# Patient Record
Sex: FEMALE | Race: WHITE | NOT HISPANIC OR LATINO | Employment: PART TIME | URBAN - METROPOLITAN AREA
[De-identification: names, ages, dates, MRNs, and addresses within clinical notes are randomized per-mention and may not be internally consistent; named-entity substitution may affect disease eponyms.]

---

## 2018-12-10 ENCOUNTER — APPOINTMENT (OUTPATIENT)
Dept: URGENT CARE | Facility: CLINIC | Age: 43
End: 2018-12-10

## 2018-12-10 DIAGNOSIS — Z02.1 PHYSICAL EXAM, PRE-EMPLOYMENT: Primary | ICD-10-CM

## 2018-12-10 PROCEDURE — 86762 RUBELLA ANTIBODY: CPT | Performed by: PHYSICIAN ASSISTANT

## 2018-12-10 PROCEDURE — 86480 TB TEST CELL IMMUN MEASURE: CPT | Performed by: PHYSICIAN ASSISTANT

## 2018-12-10 PROCEDURE — 86787 VARICELLA-ZOSTER ANTIBODY: CPT | Performed by: PHYSICIAN ASSISTANT

## 2018-12-10 PROCEDURE — 86765 RUBEOLA ANTIBODY: CPT | Performed by: PHYSICIAN ASSISTANT

## 2018-12-10 PROCEDURE — 86735 MUMPS ANTIBODY: CPT | Performed by: PHYSICIAN ASSISTANT

## 2018-12-11 LAB
MEV IGG SER QL: NORMAL
MUV IGG SER QL: NORMAL
RUBV IGG SERPL IA-ACNC: 35.3 IU/ML
VZV IGG SER IA-ACNC: NORMAL

## 2018-12-12 LAB
GAMMA INTERFERON BACKGROUND BLD IA-ACNC: 0.02 IU/ML
M TB IFN-G BLD-IMP: NEGATIVE
M TB IFN-G CD4+ BCKGRND COR BLD-ACNC: 0 IU/ML
M TB IFN-G CD4+ BCKGRND COR BLD-ACNC: 0 IU/ML
MITOGEN IGNF BCKGRD COR BLD-ACNC: 0.59 IU/ML

## 2019-02-08 ENCOUNTER — TRANSCRIBE ORDERS (OUTPATIENT)
Dept: ADMINISTRATIVE | Facility: HOSPITAL | Age: 44
End: 2019-02-08

## 2019-02-08 DIAGNOSIS — R92.8 ABNORMAL MAMMOGRAM: Primary | ICD-10-CM

## 2019-02-13 ENCOUNTER — HOSPITAL ENCOUNTER (OUTPATIENT)
Dept: ULTRASOUND IMAGING | Facility: CLINIC | Age: 44
Discharge: HOME | End: 2019-02-13
Payer: COMMERCIAL

## 2019-02-13 ENCOUNTER — HOSPITAL ENCOUNTER (OUTPATIENT)
Dept: MAMMOGRAPHY | Facility: CLINIC | Age: 44
Discharge: HOME | End: 2019-02-13
Payer: COMMERCIAL

## 2019-02-13 VITALS — HEIGHT: 64 IN | WEIGHT: 135 LBS | BODY MASS INDEX: 23.05 KG/M2

## 2019-02-13 DIAGNOSIS — R92.8 ABNORMAL MAMMOGRAM: ICD-10-CM

## 2019-02-13 PROCEDURE — 77066 DX MAMMO INCL CAD BI: CPT

## 2019-02-13 PROCEDURE — 76642 ULTRASOUND BREAST LIMITED: CPT

## 2019-02-13 PROCEDURE — G0279 TOMOSYNTHESIS, MAMMO: HCPCS

## 2019-02-13 NOTE — PROGRESS NOTES
Met with patient and Dr Russ Lindsay regarding recommendation for;      __x___ RIGHT ___x___LEFT      __x___Ultrasound guided  ______Stereotactic  Breast biopsy  __x___Verbalized understanding        Blood thinners:  _____yes __x___no    Date stopped: ___n/a________    Biopsy teaching sheet given:  ___x____yes ______no

## 2019-02-20 ENCOUNTER — HOSPITAL ENCOUNTER (OUTPATIENT)
Dept: ULTRASOUND IMAGING | Facility: CLINIC | Age: 44
Discharge: HOME | End: 2019-02-20
Admitting: RADIOLOGY
Payer: COMMERCIAL

## 2019-02-20 ENCOUNTER — HOSPITAL ENCOUNTER (OUTPATIENT)
Dept: MAMMOGRAPHY | Facility: CLINIC | Age: 44
Discharge: HOME | End: 2019-02-20

## 2019-02-20 VITALS
HEART RATE: 76 BPM | WEIGHT: 135 LBS | BODY MASS INDEX: 23.05 KG/M2 | SYSTOLIC BLOOD PRESSURE: 92 MMHG | DIASTOLIC BLOOD PRESSURE: 64 MMHG | HEIGHT: 64 IN

## 2019-02-20 DIAGNOSIS — R92.8 ABNORMAL MAMMOGRAM: ICD-10-CM

## 2019-02-20 PROCEDURE — 88305 TISSUE EXAM BY PATHOLOGIST: CPT | Performed by: PATHOLOGY

## 2019-02-20 PROCEDURE — 88342 IMHCHEM/IMCYTCHM 1ST ANTB: CPT | Performed by: PATHOLOGY

## 2019-02-20 PROCEDURE — 88341 IMHCHEM/IMCYTCHM EA ADD ANTB: CPT | Performed by: PATHOLOGY

## 2019-02-20 PROCEDURE — 19083 BX BREAST 1ST LESION US IMAG: CPT

## 2019-02-20 PROCEDURE — 19084 BX BREAST ADD LESION US IMAG: CPT

## 2019-02-20 PROCEDURE — 88361 TUMOR IMMUNOHISTOCHEM/COMPUT: CPT | Performed by: PATHOLOGY

## 2019-02-20 RX ORDER — LIDOCAINE HYDROCHLORIDE 10 MG/ML
4 INJECTION, SOLUTION INFILTRATION; PERINEURAL ONCE
Status: COMPLETED | OUTPATIENT
Start: 2019-02-20 | End: 2019-02-20

## 2019-02-20 RX ADMIN — LIDOCAINE HYDROCHLORIDE 4 ML: 10 INJECTION, SOLUTION INFILTRATION; PERINEURAL at 10:25

## 2019-02-20 RX ADMIN — LIDOCAINE HYDROCHLORIDE 4 ML: 10 INJECTION, SOLUTION INFILTRATION; PERINEURAL at 10:40

## 2019-02-20 NOTE — PROGRESS NOTES
Procedure type: (1st site)    __x___ultrasound guided _____stereotactic    Breast:    __x___Left _____Right    Location: 12:00 periareolar    Needle: 12g Marquee    # of passes: 4 (specimen A)    Clip: Securmark-cylinder    Performed by: Dr Misael Stanford held for 5 minutes by: Blayne Brown    Steri Strips:    ___x__yes _____no    Band aid:    __x___yes_____no    Tape and guaze:    _____yes ___x__no    Tolerated procedure:    __x___yes _____no      Procedure type: (2nd site)    __x___ultrasound guided _____stereotactic    Breast:    _____Left __x___Right    Location: 11:00 7cm from nipple    Needle: 12g Marquee    # of passes: 4 (specimen B)    Clip: Securmark-cylinder    Performed by: Dr Misael Stanford held for 5 minutes by: Blayne Cokeri Strips:    __x___yes _____no    Band aid:    __x___yes_____no    Tape and guaze:    _____yes __x___no    Tolerated procedure:    __x___yes _____no

## 2019-02-20 NOTE — DISCHARGE INSTR - OTHER ORDERS
POST LARGE CORE BREAST BIOPSY PATIENT INFORMATION      1  Place an ice pack inside your bra over the top of the dressing every hour for 20 minutes (20 minutes on, 60 minutes off)  Do this until bedtime  2  Do not shower or bathe until the following morning  3  You may bathe your breast carefully with the steri-strips in place  Be careful    Not to loosen them  The steri-strips will fall off in 3-5 days  4  You may have mild discomfort, and you may have some bruising where the   Needle entered the skin  This should clear within 5-7 days  5  If you need medicine for discomfort, take acetaminophen products such as   Tylenol  You may also take Advil or Motrin products  6  Do not participate in strenuous activities such as-tennis, aerobics, skiing,  Weight lifting, etc  for 24 hours  Refrain from swimming/soaking for 72 hours  7  Wearing a bra for sleeping may be more comfortable for the first 24-48 hours  8  Watch for continued bleeding, pain or fever over 101; please call with any questions or concerns  For procedures done at the [facility]  [facility] [address] call:  Quan Felipe RN at [phone]  Waldemar Gloria RN at [phone]                    *After 4 PM call the Interventional Radiology Department                    [phone] and ask to speak with the nurse on call  For procedures done at the [facility] call:         Kash Howard RN at   *After 4 PM call the Interventional Radiology Department   [phone] and ask to speak with the nurse on call  For procedures done at [facility] call: The Radiology Nurse at [phone]  *After 4 PM call your physician, or go to the Emergency Department  9          The final results of your biopsy are usually available within one week

## 2019-02-21 NOTE — PROGRESS NOTES
Post procedure call completed on 2/21/19 @ 7953    Bleeding: __x___yes _____no    Pain: __x___yes ______no    Redness/Swelling: __x____yes ______no    Band aid removed: __x___yes _____no    Steri-Strips intact: __x____yes _____no    Pt reported that she has mild bruising, discomfort, swelling and bleeding last evening that has  stopped

## 2019-02-25 ENCOUNTER — TELEPHONE (OUTPATIENT)
Dept: MAMMOGRAPHY | Facility: CLINIC | Age: 44
End: 2019-02-25

## 2019-02-25 ENCOUNTER — TRANSCRIBE ORDERS (OUTPATIENT)
Dept: ADMINISTRATIVE | Facility: HOSPITAL | Age: 44
End: 2019-02-25

## 2019-02-25 ENCOUNTER — APPOINTMENT (OUTPATIENT)
Dept: LAB | Facility: HOSPITAL | Age: 44
End: 2019-02-25
Payer: COMMERCIAL

## 2019-02-25 DIAGNOSIS — R53.82 CHRONIC FATIGUE SYNDROME: Primary | ICD-10-CM

## 2019-02-25 DIAGNOSIS — R53.82 CHRONIC FATIGUE SYNDROME: ICD-10-CM

## 2019-02-25 LAB
25(OH)D3 SERPL-MCNC: 28.3 NG/ML (ref 30–100)
ANION GAP SERPL CALCULATED.3IONS-SCNC: 4 MMOL/L (ref 4–13)
BUN SERPL-MCNC: 17 MG/DL (ref 5–25)
CALCIUM SERPL-MCNC: 8.6 MG/DL (ref 8.3–10.1)
CHLORIDE SERPL-SCNC: 101 MMOL/L (ref 100–108)
CHOLEST SERPL-MCNC: 168 MG/DL (ref 50–200)
CO2 SERPL-SCNC: 29 MMOL/L (ref 21–32)
CREAT SERPL-MCNC: 0.81 MG/DL (ref 0.6–1.3)
ERYTHROCYTE [DISTWIDTH] IN BLOOD BY AUTOMATED COUNT: 11.7 % (ref 11.6–15.1)
GFR SERPL CREATININE-BSD FRML MDRD: 90 ML/MIN/1.73SQ M
GLUCOSE P FAST SERPL-MCNC: 106 MG/DL (ref 65–99)
HCT VFR BLD AUTO: 35.5 % (ref 34.8–46.1)
HDLC SERPL-MCNC: 50 MG/DL (ref 40–60)
HGB BLD-MCNC: 11.3 G/DL (ref 11.5–15.4)
LDLC SERPL CALC-MCNC: 106 MG/DL (ref 0–100)
MCH RBC QN AUTO: 27.7 PG (ref 26.8–34.3)
MCHC RBC AUTO-ENTMCNC: 31.8 G/DL (ref 31.4–37.4)
MCV RBC AUTO: 87 FL (ref 82–98)
NONHDLC SERPL-MCNC: 118 MG/DL
PLATELET # BLD AUTO: 267 THOUSANDS/UL (ref 149–390)
PMV BLD AUTO: 10.9 FL (ref 8.9–12.7)
POTASSIUM SERPL-SCNC: 3.7 MMOL/L (ref 3.5–5.3)
RBC # BLD AUTO: 4.08 MILLION/UL (ref 3.81–5.12)
SODIUM SERPL-SCNC: 134 MMOL/L (ref 136–145)
TRIGL SERPL-MCNC: 62 MG/DL
TSH SERPL DL<=0.05 MIU/L-ACNC: 3.31 UIU/ML (ref 0.36–3.74)
WBC # BLD AUTO: 6.68 THOUSAND/UL (ref 4.31–10.16)

## 2019-02-25 PROCEDURE — 85027 COMPLETE CBC AUTOMATED: CPT

## 2019-02-25 PROCEDURE — 80061 LIPID PANEL: CPT

## 2019-02-25 PROCEDURE — 84443 ASSAY THYROID STIM HORMONE: CPT

## 2019-02-25 PROCEDURE — 36415 COLL VENOUS BLD VENIPUNCTURE: CPT

## 2019-02-25 PROCEDURE — 80048 BASIC METABOLIC PNL TOTAL CA: CPT

## 2019-02-25 PROCEDURE — 82306 VITAMIN D 25 HYDROXY: CPT

## 2019-02-26 ENCOUNTER — OFFICE VISIT (OUTPATIENT)
Dept: URGENT CARE | Facility: CLINIC | Age: 44
End: 2019-02-26
Payer: COMMERCIAL

## 2019-02-26 VITALS
HEART RATE: 88 BPM | SYSTOLIC BLOOD PRESSURE: 99 MMHG | WEIGHT: 140 LBS | TEMPERATURE: 96.9 F | RESPIRATION RATE: 12 BRPM | DIASTOLIC BLOOD PRESSURE: 58 MMHG | HEIGHT: 64 IN | BODY MASS INDEX: 23.9 KG/M2

## 2019-02-26 DIAGNOSIS — J01.90 ACUTE SINUSITIS, RECURRENCE NOT SPECIFIED, UNSPECIFIED LOCATION: Primary | ICD-10-CM

## 2019-02-26 PROCEDURE — S9088 SERVICES PROVIDED IN URGENT: HCPCS | Performed by: PHYSICIAN ASSISTANT

## 2019-02-26 PROCEDURE — 99213 OFFICE O/P EST LOW 20 MIN: CPT | Performed by: PHYSICIAN ASSISTANT

## 2019-02-26 RX ORDER — AMOXICILLIN AND CLAVULANATE POTASSIUM 875; 125 MG/1; MG/1
1 TABLET, FILM COATED ORAL EVERY 12 HOURS SCHEDULED
Qty: 14 TABLET | Refills: 0 | Status: SHIPPED | OUTPATIENT
Start: 2019-02-26 | End: 2019-03-05

## 2019-02-26 NOTE — PATIENT INSTRUCTIONS
Take augmentin as directed   Ibuprofen as needed  Allegra or zyrtec  flonase  Increase fluids   F/u with PCP if no improvement in 3-4 days  If anything changes or worsens f/u sooner

## 2019-02-26 NOTE — PROGRESS NOTES
NAME: Nicolasa Aguilar is a 41 y o  female  : 1975    MRN: [id number]      Assessment and Plan   Acute sinusitis, recurrence not specified, unspecified location [J01 90]  1  Acute sinusitis, recurrence not specified, unspecified location  amoxicillin-clavulanate (AUGMENTIN) 875-125 mg per tablet           Patient Instructions   Patient Instructions   Take augmentin as directed   Ibuprofen as needed  Allegra or zyrtec  flonase  Increase fluids   F/u with PCP if no improvement in 3-4 days  If anything changes or worsens f/u sooner     Proceed to ER if symptoms worsen  Chief Complaint   No chief complaint on file  History of Present Illness   Patient with no pmhx presents complaining of 2 day hx of sore throat  She states a few weeks ago she had an URI and states it lasted a few weeks  She reports she was starting to feel better for a few days but then acutely worsened again  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  Patient reports sinus p/p, b/l ear pain, sore throat, cough and congestion  She denies fevers, chills, chest tightness, SOB or dyspnea  She states she has not been taking anything for her sx  Review of Systems   Review of Systems   Constitutional: Negative for chills and fever  HENT: Positive for congestion, ear pain, rhinorrhea, sinus pressure, sinus pain and sore throat  Respiratory: Positive for cough  Negative for chest tightness, shortness of breath and wheezing  Cardiovascular: Negative for chest pain and palpitations  Current Medications       Current Outpatient Medications:     amoxicillin-clavulanate (AUGMENTIN) 875-125 mg per tablet, Take 1 tablet by mouth every 12 (twelve) hours for 7 days, Disp: 14 tablet, Rfl: 0    Current Allergies     Allergies as of 2019    (No Known Allergies)              History reviewed  No pertinent past medical history      Past Surgical History:   Procedure Laterality Date    SINUS SURGERY  US GUIDANCE BREAST BIOPSY RIGHT EACH ADDITIONAL Right 2/20/2019    US GUIDED BREAST BIOPSY LEFT COMPLETE Left 2/20/2019       History reviewed  No pertinent family history  Medications have been verified  The following portions of the patient's history were reviewed and updated as appropriate: allergies, current medications, past family history, past medical history, past social history, past surgical history and problem list     Objective   BP 99/58 (BP Location: Left arm, Patient Position: Sitting, Cuff Size: Standard)   Pulse 88   Temp (!) 96 9 °F (36 1 °C) (Tympanic)   Resp 12   Ht 5' 4" (1 626 m)   Wt 63 5 kg (140 lb)   LMP 02/15/2019 (Exact Date)   BMI 24 03 kg/m²      Physical Exam     Physical Exam   Constitutional: She appears well-developed and well-nourished  No distress  HENT:   TMs clear b/l without erythema or bulging  Purulent fluid behind both  Nasal mucosa erythematous with edema  Oropharynx without erythema, edema, exudates or tonsillar hypertrophy  +PND   Cardiovascular: Normal rate, regular rhythm and normal heart sounds  Pulmonary/Chest: Effort normal and breath sounds normal  No stridor  No respiratory distress  She has no wheezes  She has no rales  Lymphadenopathy:     She has no cervical adenopathy  Skin: She is not diaphoretic

## 2019-02-27 ENCOUNTER — CONSULT (OUTPATIENT)
Dept: SURGICAL ONCOLOGY | Facility: CLINIC | Age: 44
End: 2019-02-27
Payer: COMMERCIAL

## 2019-02-27 ENCOUNTER — DOCUMENTATION (OUTPATIENT)
Dept: SURGICAL ONCOLOGY | Facility: CLINIC | Age: 44
End: 2019-02-27

## 2019-02-27 VITALS
BODY MASS INDEX: 23.49 KG/M2 | DIASTOLIC BLOOD PRESSURE: 70 MMHG | SYSTOLIC BLOOD PRESSURE: 100 MMHG | RESPIRATION RATE: 14 BRPM | HEIGHT: 64 IN | TEMPERATURE: 98.3 F | WEIGHT: 137.6 LBS | HEART RATE: 68 BPM

## 2019-02-27 DIAGNOSIS — D24.1 BREAST FIBROADENOMA, RIGHT: ICD-10-CM

## 2019-02-27 DIAGNOSIS — R92.2 DENSE BREAST TISSUE: ICD-10-CM

## 2019-02-27 DIAGNOSIS — D05.12 BREAST NEOPLASM, TIS (DCIS), LEFT: Primary | ICD-10-CM

## 2019-02-27 PROBLEM — N64.52 NIPPLE DISCHARGE: Status: RESOLVED | Noted: 2019-02-27 | Resolved: 2019-02-27

## 2019-02-27 PROBLEM — R92.30 DENSE BREAST TISSUE: Status: ACTIVE | Noted: 2019-02-27

## 2019-02-27 PROCEDURE — 99245 OFF/OP CONSLTJ NEW/EST HI 55: CPT | Performed by: SURGERY

## 2019-02-27 NOTE — LETTER
2019     Dionisio Sharif MD  [address] [address]    Patient: Paula Torres   YOB: 1975   Date of Visit: 2019       Dear Dr Heaven Marshall: Thank you for referring Kike Saeed to me for evaluation  Below are my notes for this consultation  If you have questions, please do not hesitate to call me  I look forward to following your patient along with you  Sincerely,        Regla Garcia MD        CC: No Recipients  Regla Garcia MD  2019  2:57 PM  Sign at close encounter    Breast Consultation-Surgical Oncology     [place] [address]    Name:  Paula Torres  YOB: 1975  MRN:  [id number]    Assessment/Plan   Diagnoses and all orders for this visit:    Breast neoplasm, Tis (DCIS), left    Breast fibroadenoma, right    Dense breast tissue        HPI: Paula Torres is a 41y o  year old female who presents with a left breast cancer  Pt shares she experienced left breast bloody nipple discharge  This was spontaneous and also on compression  She denied any breast lumps or skin changes  Pt had abnormal breast imaging which led to bilateral breast biopsies  Right breast biopsy was benign; left breast biopsy revealed DCIS  Her biopsy sites are intact and healing well with resolving ecchymosis  She denies any family history of breast cancer  Surgical treatment to date consisted of n/a  Oncology History:     No history exists         Pertinent reproductive history:  Age at menarche:  17  OB History        1    Para   1    Term   1            AB        Living           SAB        TAB        Ectopic        Multiple        Live Births               Obstetric Comments   Menarche : 17  Age at first pregnancy 31  Hx of BCP   Hx of Hormone replacement            Age at first live birth:  31  Age at menopause: n/a  Hysterectomy/Oophrectomy:  No  Hormone replacement therapy:  No  Birth control pills:  Yes    Problem List:   There is no problem list on file for this patient      Past Medical History:   Diagnosis Date    Breast cancer ([address] ) 02/20/2019    left DCIS    Difficulty swallowing     Generalized body aches     Night sweat     Nipple discharge     Palpitations     Sinus infection     Weakness      Past Surgical History:   Procedure Laterality Date    LEG SURGERY Left     cyst removed from leg    SINUS SURGERY      US GUIDANCE BREAST BIOPSY RIGHT EACH ADDITIONAL Right 2/20/2019    US GUIDED BREAST BIOPSY LEFT COMPLETE Left 2/20/2019     Family History   Problem Relation Age of Onset    Melanoma Maternal Grandfather 79    Leukemia Maternal Grandmother 80     Social History     Socioeconomic History    Marital status: /Civil Union     Spouse name: Not on file    Number of children: Not on file    Years of education: Not on file    Highest education level: Not on file   Occupational History    Not on file   Social Needs    Financial resource strain: Not on file    Food insecurity:     Worry: Not on file     Inability: Not on file    Transportation needs:     Medical: Not on file     Non-medical: Not on file   Tobacco Use    Smoking status: Former Smoker    Smokeless tobacco: Never Used    Tobacco comment: was a casual former smoker    Substance and Sexual Activity    Alcohol use: Yes     Binge frequency: Weekly     Comment: 7-10 drinks per week     Drug use: No    Sexual activity: Not on file   Lifestyle    Physical activity:     Days per week: Not on file     Minutes per session: Not on file    Stress: Not on file   Relationships    Social connections:     Talks on phone: Not on file     Gets together: Not on file     Attends Rastafarian service: Not on file     Active member of club or organization: Not on file     Attends meetings of clubs or organizations: Not on file Relationship status: Not on file    Intimate partner violence:     Fear of current or ex partner: Not on file     Emotionally abused: Not on file     Physically abused: Not on file     Forced sexual activity: Not on file   Other Topics Concern    Not on file   Social History Narrative    Not on file     Current Outpatient Medications   Medication Sig Dispense Refill    amoxicillin-clavulanate (AUGMENTIN) 875-125 mg per tablet Take 1 tablet by mouth every 12 (twelve) hours for 7 days 14 tablet 0     No current facility-administered medications for this visit  No Known Allergies      The following portions of the patient's history were reviewed and updated as appropriate: allergies, current medications, past family history, past medical history, past social history, past surgical history and problem list     Review of Systems:  Review of Systems   Constitutional: Negative for appetite change and fever  Hot flashes   HENT: Positive for trouble swallowing (will have eval, attributes to sinus problems)  Sinusitis   Eyes: Negative  Respiratory: Negative for shortness of breath  Cardiovascular: Positive for palpitations  Gastrointestinal: Negative  Endocrine: Negative  Genitourinary: Negative  Musculoskeletal: Positive for arthralgias  Negative for myalgias  Skin: Negative  Allergic/Immunologic: Negative  Neurological: Positive for weakness  Hematological: Negative  Negative for adenopathy  Does not bruise/bleed easily  Psychiatric/Behavioral: Negative  Physical Exam:  Physical Exam   Constitutional: She is oriented to person, place, and time  She appears well-developed and well-nourished  HENT:   Head: Normocephalic and atraumatic  Cardiovascular: Normal heart sounds  Pulmonary/Chest: Breath sounds normal  Right breast exhibits skin change (Resolving ecchymosis from recent biopsy)   Right breast exhibits no inverted nipple, no mass, no nipple discharge and no tenderness  Left breast exhibits skin change ( resolving ecchymosis from recent biopsy)  Left breast exhibits no inverted nipple, no mass, no nipple discharge and no tenderness  Bilateral dense tissue with nodularity diffusely   Abdominal: Soft  Lymphadenopathy:        Right axillary: No pectoral and no lateral adenopathy present  Left axillary: No pectoral and no lateral adenopathy present  Right: No supraclavicular adenopathy present  Left: No supraclavicular adenopathy present  Neurological: She is alert and oriented to person, place, and time  Psychiatric: She has a normal mood and affect  Laboratory:  2019 core biopsy of the bilateral breast     Pathology revealed: ductal carcinoma in situ on the left side    Histologic grade: moderately differentiated     Angiolymphatic invasion:  absent    Tumor node status:  Clinically Negative    Hormone receptor status:  estrogen receptor positive and progesterone receptor positive    Other studies:  Outside bilateral screening mammogram done [facility] was a BI-RADS 0 with additional imaging recommended for the bilateral breasts    Imagin19  3D bilateral dx mammogram/US  B4 (4) extremely dense breast tissue with nodularity in the bilateral upper outer quadrants; there is a 12 mm hypoechoic lesion on the right side for which biopsy was recommended and a 9 mm hypoechoic lesion left retroareolar for which biopsy was recommended  19  Bilateral breast biopsies as noted above, these were concordant, breast MRI was recommended secondary to the extremely dense tissue as well as additional nodularity throughout the breast with presumed cystic changes      Discussion/Summary:  53-year-old female who presents today secondary to ductal carcinoma in situ of the left breast   She presented with bloody nipple discharge  She had abnormal imaging 2-3 months ago at an outside facility    She then presented to our [facility] [place] for bilateral diagnostic imaging  She had bilateral core biopsies  The right side was benign  The left side reveals intermediate grade ductal carcinoma in situ, estrogen/progesterone positive  She does have extremely dense breast tissue as well as additional nodularity bilateral   An MRI was recommended for further evaluation  I do agree with this recommendation and will make these arrangements for her  This will be helpful to identify any occult disease and to define extent of disease on the left side  Additionally given her young age, I am recommending genetic testing  She is agreeable to proceeding with this  She understands that the results of the genetic evaluation would alter any treatment recommendations  She would potentially need to be screened for other carcinomas  If her MRI is otherwise of no concern and there are no significant mutations on the genetic testing, she would then be able to have breast conservation  This would include a left lumpectomy and radiation therapy as well as adjuvant endocrine therapy  I would not see any role for lymphatic mapping should she have breast conservation based on her current imaging and examination  All of her questions were answered today  As stated, I will make arrangements for the breast MRI and genetic testing  I will call her with these results  Further recommendations will be made at that time

## 2019-02-27 NOTE — PROGRESS NOTES
Oncology Navigator Visit  Breast Nurse Navigator    Met patient during scheduled appointment with Dr Jadyn Marcelo  Discussed role of Breast Nurse Navigator throughout care and treatment  Also discussed additional cancer support services availbale  Patient denies any needs at this time  Coping well, supportive  accompanied pt to visit today, has 10year old child  Offered MS services for coping, pt stated she doesn't feel its needed at this time as she still is not sure what will be happening next in regards to surgery or treatment, will be getting Breast MRI and genetic testing to plan treatment  Pt stated after all information is known she will let me know if she needs any help with anything  Patient agreeable to navigation follow up calls  Provided patient with my contact information and availability and encouraged to call as needed  Patient agreeable  I will follow up with patient after testing completed and plan in place

## 2019-02-27 NOTE — PROGRESS NOTES
Breast Consultation-Surgical Oncology     [address]  [facility] [place] [address]    Name:  Dana Figueroa  YOB: 1975  MRN:  [id number]    Assessment/Plan   Diagnoses and all orders for this visit:    Breast neoplasm, Tis (DCIS), left    Breast fibroadenoma, right    Dense breast tissue        HPI: Dana Figueroa is a 41y o  year old female who presents with a left breast cancer  Pt shares she experienced left breast bloody nipple discharge  This was spontaneous and also on compression  She denied any breast lumps or skin changes  Pt had abnormal breast imaging which led to bilateral breast biopsies  Right breast biopsy was benign; left breast biopsy revealed DCIS  Her biopsy sites are intact and healing well with resolving ecchymosis  She denies any family history of breast cancer  Surgical treatment to date consisted of n/a  Oncology History:     No history exists  Pertinent reproductive history:  Age at menarche:  17  OB History        1    Para   1    Term   1            AB        Living           SAB        TAB        Ectopic        Multiple        Live Births               Obstetric Comments   Menarche : 17  Age at first pregnancy 31  Hx of BCP   Hx of Hormone replacement            Age at first live birth:  31  Age at menopause:  n/a  Hysterectomy/Oophrectomy:  No  Hormone replacement therapy:  No  Birth control pills:  Yes    Problem List:   There is no problem list on file for this patient      Past Medical History:   Diagnosis Date    Breast cancer ([facility] [address] ) 2019    left DCIS    Difficulty swallowing     Generalized body aches     Night sweat     Nipple discharge     Palpitations     Sinus infection     Weakness      Past Surgical History:   Procedure Laterality Date    LEG SURGERY Left     cyst removed from leg    SINUS SURGERY      US GUIDANCE BREAST BIOPSY RIGHT EACH ADDITIONAL Right 2/20/2019    US GUIDED BREAST BIOPSY LEFT COMPLETE Left 2/20/2019     Family History   Problem Relation Age of Onset    Melanoma Maternal Grandfather 79    Leukemia Maternal Grandmother 80     Social History     Socioeconomic History    Marital status: /Civil Union     Spouse name: Not on file    Number of children: Not on file    Years of education: Not on file    Highest education level: Not on file   Occupational History    Not on file   Social Needs    Financial resource strain: Not on file    Food insecurity:     Worry: Not on file     Inability: Not on file    Transportation needs:     Medical: Not on file     Non-medical: Not on file   Tobacco Use    Smoking status: Former Smoker    Smokeless tobacco: Never Used    Tobacco comment: was a casual former smoker    Substance and Sexual Activity    Alcohol use: Yes     Binge frequency: Weekly     Comment: 7-10 drinks per week     Drug use: No    Sexual activity: Not on file   Lifestyle    Physical activity:     Days per week: Not on file     Minutes per session: Not on file    Stress: Not on file   Relationships    Social connections:     Talks on phone: Not on file     Gets together: Not on file     Attends Shinto service: Not on file     Active member of club or organization: Not on file     Attends meetings of clubs or organizations: Not on file     Relationship status: Not on file    Intimate partner violence:     Fear of current or ex partner: Not on file     Emotionally abused: Not on file     Physically abused: Not on file     Forced sexual activity: Not on file   Other Topics Concern    Not on file   Social History Narrative    Not on file     Current Outpatient Medications   Medication Sig Dispense Refill    amoxicillin-clavulanate (AUGMENTIN) 875-125 mg per tablet Take 1 tablet by mouth every 12 (twelve) hours for 7 days 14 tablet 0     No current facility-administered medications for this visit        No Known Allergies      The following portions of the patient's history were reviewed and updated as appropriate: allergies, current medications, past family history, past medical history, past social history, past surgical history and problem list     Review of Systems:  Review of Systems   Constitutional: Negative for appetite change and fever  Hot flashes   HENT: Positive for trouble swallowing (will have eval, attributes to sinus problems)  Sinusitis   Eyes: Negative  Respiratory: Negative for shortness of breath  Cardiovascular: Positive for palpitations  Gastrointestinal: Negative  Endocrine: Negative  Genitourinary: Negative  Musculoskeletal: Positive for arthralgias  Negative for myalgias  Skin: Negative  Allergic/Immunologic: Negative  Neurological: Positive for weakness  Hematological: Negative  Negative for adenopathy  Does not bruise/bleed easily  Psychiatric/Behavioral: Negative  Physical Exam:  Physical Exam   Constitutional: She is oriented to person, place, and time  She appears well-developed and well-nourished  HENT:   Head: Normocephalic and atraumatic  Cardiovascular: Normal heart sounds  Pulmonary/Chest: Breath sounds normal  Right breast exhibits skin change (Resolving ecchymosis from recent biopsy)  Right breast exhibits no inverted nipple, no mass, no nipple discharge and no tenderness  Left breast exhibits skin change ( resolving ecchymosis from recent biopsy)  Left breast exhibits no inverted nipple, no mass, no nipple discharge and no tenderness  Bilateral dense tissue with nodularity diffusely   Abdominal: Soft  Lymphadenopathy:        Right axillary: No pectoral and no lateral adenopathy present  Left axillary: No pectoral and no lateral adenopathy present  Right: No supraclavicular adenopathy present  Left: No supraclavicular adenopathy present  Neurological: She is alert and oriented to person, place, and time  Psychiatric: She has a normal mood and affect  Laboratory:  2019 core biopsy of the bilateral breast     Pathology revealed: ductal carcinoma in situ on the left side    Histologic grade: moderately differentiated     Angiolymphatic invasion:  absent    Tumor node status:  Clinically Negative    Hormone receptor status:  estrogen receptor positive and progesterone receptor positive    Other studies:  Outside bilateral screening [facility] was a BI-RADS 0 with additional imaging recommended for the bilateral breasts    Imagin19  3D bilateral dx mammogram/US  B4 (4) extremely dense breast tissue with nodularity in the bilateral upper outer quadrants; there is a 12 mm hypoechoic lesion on the right side for which biopsy was recommended and a 9 mm hypoechoic lesion left retroareolar for which biopsy was recommended  19  Bilateral breast biopsies as noted above, these were concordant, breast MRI was recommended secondary to the extremely dense tissue as well as additional nodularity throughout the breast with presumed cystic changes      Discussion/Summary:  81-year-old female who presents today secondary to ductal carcinoma in situ of the left breast   She presented with bloody nipple discharge  She had abnormal imaging 2-3 months ago at an outside facility  [facility]  She had bilateral core biopsies  The right side was benign  The left side reveals intermediate grade ductal carcinoma in situ, estrogen/progesterone positive  She does have extremely dense breast tissue as well as additional nodularity bilateral   An MRI was recommended for further evaluation  I do agree with this recommendation and will make these arrangements for her  This will be helpful to identify any occult disease and to define extent of disease on the left side    Additionally given her young age, I am recommending genetic testing  She is agreeable to proceeding with this  She understands that the results of the genetic evaluation would alter any treatment recommendations  She would potentially need to be screened for other carcinomas  If her MRI is otherwise of no concern and there are no significant mutations on the genetic testing, she would then be able to have breast conservation  This would include a left lumpectomy and radiation therapy as well as adjuvant endocrine therapy  I would not see any role for lymphatic mapping should she have breast conservation based on her current imaging and examination  All of her questions were answered today  As stated, I will make arrangements for the breast MRI and genetic testing  I will call her with these results  Further recommendations will be made at that time

## 2019-03-06 ENCOUNTER — HOSPITAL ENCOUNTER (OUTPATIENT)
Dept: MRI IMAGING | Facility: HOSPITAL | Age: 44
Discharge: HOME | End: 2019-03-06
Attending: SURGERY
Payer: COMMERCIAL

## 2019-03-06 DIAGNOSIS — D05.12 BREAST NEOPLASM, TIS (DCIS), LEFT: ICD-10-CM

## 2019-03-06 DIAGNOSIS — R92.2 DENSE BREAST TISSUE: ICD-10-CM

## 2019-03-06 DIAGNOSIS — D05.12 BREAST NEOPLASM, TIS (DCIS), LEFT: Primary | ICD-10-CM

## 2019-03-06 PROCEDURE — A9585 GADOBUTROL INJECTION: HCPCS | Performed by: SURGERY

## 2019-03-06 PROCEDURE — C8908 MRI W/O FOL W/CONT, BREAST,: HCPCS

## 2019-03-06 RX ADMIN — GADOBUTROL 6 ML: 604.72 INJECTION INTRAVENOUS at 10:15

## 2019-03-12 ENCOUNTER — OFFICE VISIT (OUTPATIENT)
Dept: SURGICAL ONCOLOGY | Facility: CLINIC | Age: 44
End: 2019-03-12
Payer: COMMERCIAL

## 2019-03-12 VITALS
TEMPERATURE: 98.5 F | HEART RATE: 68 BPM | SYSTOLIC BLOOD PRESSURE: 104 MMHG | BODY MASS INDEX: 23.31 KG/M2 | WEIGHT: 135.8 LBS | DIASTOLIC BLOOD PRESSURE: 70 MMHG

## 2019-03-12 DIAGNOSIS — D05.12 BREAST NEOPLASM, TIS (DCIS), LEFT: Primary | ICD-10-CM

## 2019-03-12 PROCEDURE — 99215 OFFICE O/P EST HI 40 MIN: CPT | Performed by: SURGERY

## 2019-03-12 RX ORDER — CEFAZOLIN SODIUM 1 G/50ML
1000 SOLUTION INTRAVENOUS ONCE
Status: CANCELLED | OUTPATIENT
Start: 2019-05-17 | End: 2019-03-12

## 2019-03-12 NOTE — PROGRESS NOTES
Surgical Oncology Follow Up       [address]  [facility] [address] [address]    Shefali Jara  1975  [id number]  [address]  [facility] [address]    Chief Complaint   Patient presents with    Breast Cancer     consent for surgery       Assessment/Plan   Diagnoses and all orders for this visit:    Breast neoplasm, Tis (DCIS), left    Other orders  -     ceFAZolin (ANCEF) 1 g in sodium chloride 0 9% 50 ml IVPB  -     enoxaparin (LOVENOX) subcutaneous injection 30 mg        Advance Care Planning/Advance Directives:  Discussed disease status, cancer treatment plans and/or cancer treatment goals with the patient  Oncology History:     No history exists  History of Present Illness: DCIS left breast   -Interval History:  Breast MRI and genetic testing    Review of Systems:  Review of Systems   Constitutional: Negative  Negative for appetite change and fever  Eyes: Negative  Respiratory: Negative for shortness of breath  Cardiovascular: Negative  Gastrointestinal: Negative  Endocrine: Negative  Genitourinary: Negative  Musculoskeletal: Negative  Negative for arthralgias and myalgias  Skin: Negative  Allergic/Immunologic: Negative  Neurological: Negative  Hematological: Negative  Negative for adenopathy  Does not bruise/bleed easily  Psychiatric/Behavioral: Negative          Patient Active Problem List   Diagnosis    Breast neoplasm, Tis (DCIS), left    Breast fibroadenoma, right    Dense breast tissue     Past Medical History:   Diagnosis Date    Difficulty swallowing     Generalized body aches     Night sweat     Nipple discharge     Palpitations     Sinus infection     Weakness      Past Surgical History:   Procedure Laterality Date    LEG SURGERY Left     cyst removed from leg    SINUS SURGERY      US GUIDANCE BREAST BIOPSY RIGHT EACH ADDITIONAL Right 2/20/2019    US GUIDED BREAST BIOPSY LEFT COMPLETE Left 2/20/2019     Family History   Problem Relation Age of Onset    Melanoma Maternal Grandfather 79    Leukemia Maternal Grandmother 80     Social History     Socioeconomic History    Marital status: /Civil Union     Spouse name: Not on file    Number of children: Not on file    Years of education: Not on file    Highest education level: Not on file   Occupational History    Not on file   Social Needs    Financial resource strain: Not on file    Food insecurity:     Worry: Not on file     Inability: Not on file    Transportation needs:     Medical: Not on file     Non-medical: Not on file   Tobacco Use    Smoking status: Former Smoker    Smokeless tobacco: Never Used    Tobacco comment: was a casual former smoker    Substance and Sexual Activity    Alcohol use: Yes     Binge frequency: Weekly     Comment: 7-10 drinks per week     Drug use: No    Sexual activity: Not on file   Lifestyle    Physical activity:     Days per week: Not on file     Minutes per session: Not on file    Stress: Not on file   Relationships    Social connections:     Talks on phone: Not on file     Gets together: Not on file     Attends Oriental orthodox service: Not on file     Active member of club or organization: Not on file     Attends meetings of clubs or organizations: Not on file     Relationship status: Not on file    Intimate partner violence:     Fear of current or ex partner: Not on file     Emotionally abused: Not on file     Physically abused: Not on file     Forced sexual activity: Not on file   Other Topics Concern    Not on file   Social History Narrative    Not on file     No current outpatient medications on file    No Known Allergies    The following portions of the patient's history were reviewed and updated as appropriate: allergies, current medications, past family history, past medical history, past social history, past surgical history and problem list         Vitals:    03/12/19 0840   BP: 104/70   Pulse: 68   Temp: 98 5 °F (36 9 °C)       Physical Exam   Constitutional: She is oriented to person, place, and time  She appears well-developed and well-nourished  Neurological: She is alert and oriented to person, place, and time  Psychiatric: She has a normal mood and affect  Results:  Labs:  My Risk panel showed no genetic mutations    Imaging  03/06/2019 MRI the bilateral breast the right side is benign the left side shows non mass enhancement in a segmental fashion in the left upper inner breast spanning 5 cm    I reviewed the above laboratory and imaging data  Discussion/Summary:  43-year-old female with ductal carcinoma in situ of the left breast   This does span the upper inner quadrant  She was therefore counseled on a left mastectomy along with sentinel node biopsy  Her genetic testing was negative  Her MRI was benign on the right side  There is no indication for any surgery of the right breast   She met with Dr Bo Gill from Plastic surgery and is opting for expander/implant reconstruction  I reviewed the procedure of a skin sparing mastectomy along with lymphatic mapping and sentinel node biopsy  All of her questions were answered today  Consent was signed in the office  She states that she would like to wait until May for surgery to finish her current semester in school  Given the in situ nature of the disease, this is acceptable    She understands that she will need to return for preoperative history and physical

## 2019-04-03 PROBLEM — J34.89 SINUS PRESSURE: Status: ACTIVE | Noted: 2019-04-03

## 2019-04-03 PROBLEM — K21.9 LARYNGOPHARYNGEAL REFLUX (LPR): Status: ACTIVE | Noted: 2019-04-03

## 2019-04-03 PROBLEM — R42 DIZZINESS: Status: ACTIVE | Noted: 2019-04-03

## 2019-04-07 PROBLEM — J34.2 DNS (DEVIATED NASAL SEPTUM): Status: ACTIVE | Noted: 2019-04-07

## 2019-04-15 ENCOUNTER — OFFICE VISIT (OUTPATIENT)
Dept: FAMILY MEDICINE CLINIC | Facility: CLINIC | Age: 44
End: 2019-04-15
Payer: COMMERCIAL

## 2019-04-15 ENCOUNTER — TELEPHONE (OUTPATIENT)
Dept: FAMILY MEDICINE CLINIC | Facility: CLINIC | Age: 44
End: 2019-04-15

## 2019-04-15 ENCOUNTER — TELEPHONE (OUTPATIENT)
Dept: SURGICAL ONCOLOGY | Facility: CLINIC | Age: 44
End: 2019-04-15

## 2019-04-15 VITALS
SYSTOLIC BLOOD PRESSURE: 102 MMHG | BODY MASS INDEX: 22.78 KG/M2 | HEART RATE: 89 BPM | WEIGHT: 136.75 LBS | TEMPERATURE: 97.7 F | OXYGEN SATURATION: 99 % | HEIGHT: 65 IN | DIASTOLIC BLOOD PRESSURE: 70 MMHG

## 2019-04-15 DIAGNOSIS — M25.50 ARTHRALGIA, UNSPECIFIED JOINT: ICD-10-CM

## 2019-04-15 DIAGNOSIS — R42 VERTIGO: ICD-10-CM

## 2019-04-15 DIAGNOSIS — R26.89 IMBALANCE: ICD-10-CM

## 2019-04-15 DIAGNOSIS — G89.29 CHRONIC INTRACTABLE HEADACHE, UNSPECIFIED HEADACHE TYPE: Primary | ICD-10-CM

## 2019-04-15 DIAGNOSIS — R51.9 CHRONIC INTRACTABLE HEADACHE, UNSPECIFIED HEADACHE TYPE: Primary | ICD-10-CM

## 2019-04-15 DIAGNOSIS — R25.3 MUSCLE TWITCHING: ICD-10-CM

## 2019-04-15 DIAGNOSIS — M54.2 NECK PAIN: ICD-10-CM

## 2019-04-15 PROBLEM — R68.82 LOW LIBIDO: Status: ACTIVE | Noted: 2018-08-14

## 2019-04-15 PROBLEM — N94.10 DYSPAREUNIA, FEMALE: Status: ACTIVE | Noted: 2018-08-14

## 2019-04-15 PROCEDURE — 99204 OFFICE O/P NEW MOD 45 MIN: CPT | Performed by: FAMILY MEDICINE

## 2019-04-15 RX ORDER — OXYCODONE HYDROCHLORIDE AND ACETAMINOPHEN 5; 325 MG/1; MG/1
TABLET ORAL
Refills: 0 | COMMUNITY
Start: 2019-03-28 | End: 2019-07-19 | Stop reason: ALTCHOICE

## 2019-04-18 ENCOUNTER — HOSPITAL ENCOUNTER (OUTPATIENT)
Dept: CT IMAGING | Facility: HOSPITAL | Age: 44
Discharge: HOME | End: 2019-04-18
Attending: FAMILY MEDICINE
Payer: COMMERCIAL

## 2019-04-18 ENCOUNTER — HOSPITAL ENCOUNTER (OUTPATIENT)
Dept: CT IMAGING | Facility: HOSPITAL | Age: 44
Discharge: HOME | End: 2019-04-18
Payer: COMMERCIAL

## 2019-04-18 ENCOUNTER — APPOINTMENT (OUTPATIENT)
Dept: LAB | Facility: HOSPITAL | Age: 44
End: 2019-04-18
Attending: FAMILY MEDICINE
Payer: COMMERCIAL

## 2019-04-18 DIAGNOSIS — R42 VERTIGO: ICD-10-CM

## 2019-04-18 DIAGNOSIS — R51.9 CHRONIC INTRACTABLE HEADACHE, UNSPECIFIED HEADACHE TYPE: ICD-10-CM

## 2019-04-18 DIAGNOSIS — R42 DIZZINESS: ICD-10-CM

## 2019-04-18 DIAGNOSIS — G89.29 CHRONIC INTRACTABLE HEADACHE, UNSPECIFIED HEADACHE TYPE: ICD-10-CM

## 2019-04-18 DIAGNOSIS — R26.89 IMBALANCE: ICD-10-CM

## 2019-04-18 DIAGNOSIS — J34.89 SINUS PRESSURE: ICD-10-CM

## 2019-04-18 DIAGNOSIS — M25.50 ARTHRALGIA, UNSPECIFIED JOINT: ICD-10-CM

## 2019-04-18 DIAGNOSIS — R25.3 MUSCLE TWITCHING: ICD-10-CM

## 2019-04-18 LAB
CK SERPL-CCNC: 30 U/L (ref 26–192)
ERYTHROCYTE [SEDIMENTATION RATE] IN BLOOD: 5 MM/HOUR (ref 0–15)
RHEUMATOID FACT SER QL LA: NEGATIVE
TSH SERPL DL<=0.05 MIU/L-ACNC: 2.64 UIU/ML (ref 0.36–3.74)

## 2019-04-18 PROCEDURE — 70486 CT MAXILLOFACIAL W/O DYE: CPT

## 2019-04-18 PROCEDURE — 86430 RHEUMATOID FACTOR TEST QUAL: CPT

## 2019-04-18 PROCEDURE — 82550 ASSAY OF CK (CPK): CPT

## 2019-04-18 PROCEDURE — 86038 ANTINUCLEAR ANTIBODIES: CPT

## 2019-04-18 PROCEDURE — 86618 LYME DISEASE ANTIBODY: CPT

## 2019-04-18 PROCEDURE — 70450 CT HEAD/BRAIN W/O DYE: CPT

## 2019-04-18 PROCEDURE — 84443 ASSAY THYROID STIM HORMONE: CPT

## 2019-04-18 PROCEDURE — 85652 RBC SED RATE AUTOMATED: CPT

## 2019-04-18 PROCEDURE — 36415 COLL VENOUS BLD VENIPUNCTURE: CPT

## 2019-04-19 ENCOUNTER — PATIENT MESSAGE (OUTPATIENT)
Dept: FAMILY MEDICINE CLINIC | Facility: CLINIC | Age: 44
End: 2019-04-19

## 2019-04-19 DIAGNOSIS — F41.1 GENERALIZED ANXIETY DISORDER: Primary | ICD-10-CM

## 2019-04-19 LAB
B BURGDOR IGG SER IA-ACNC: 0.05
B BURGDOR IGM SER IA-ACNC: 0.66
RYE IGE QN: NEGATIVE

## 2019-04-19 RX ORDER — DIAZEPAM 5 MG/1
5 TABLET ORAL EVERY 6 HOURS PRN
Qty: 15 TABLET | Refills: 0 | Status: SHIPPED | OUTPATIENT
Start: 2019-04-19

## 2019-04-29 ENCOUNTER — OFFICE VISIT (OUTPATIENT)
Dept: SURGICAL ONCOLOGY | Facility: CLINIC | Age: 44
End: 2019-04-29
Payer: COMMERCIAL

## 2019-04-29 VITALS
HEIGHT: 65 IN | BODY MASS INDEX: 22.99 KG/M2 | WEIGHT: 138 LBS | DIASTOLIC BLOOD PRESSURE: 62 MMHG | SYSTOLIC BLOOD PRESSURE: 110 MMHG | RESPIRATION RATE: 16 BRPM | HEART RATE: 68 BPM

## 2019-04-29 DIAGNOSIS — D05.12 BREAST NEOPLASM, TIS (DCIS), LEFT: Primary | ICD-10-CM

## 2019-04-29 PROCEDURE — PREOP: Performed by: NURSE PRACTITIONER

## 2019-05-06 ENCOUNTER — TELEPHONE (OUTPATIENT)
Dept: SURGICAL ONCOLOGY | Facility: CLINIC | Age: 44
End: 2019-05-06

## 2019-05-06 ENCOUNTER — APPOINTMENT (OUTPATIENT)
Dept: LAB | Facility: HOSPITAL | Age: 44
End: 2019-05-06
Attending: SURGERY
Payer: COMMERCIAL

## 2019-05-06 ENCOUNTER — DOCUMENTATION (OUTPATIENT)
Dept: HEMATOLOGY ONCOLOGY | Facility: CLINIC | Age: 44
End: 2019-05-06

## 2019-05-06 ENCOUNTER — ANESTHESIA EVENT (OUTPATIENT)
Dept: PERIOP | Facility: HOSPITAL | Age: 44
DRG: 581 | End: 2019-05-06
Payer: COMMERCIAL

## 2019-05-06 ENCOUNTER — APPOINTMENT (OUTPATIENT)
Dept: PREADMISSION TESTING | Facility: HOSPITAL | Age: 44
End: 2019-05-06
Payer: COMMERCIAL

## 2019-05-06 ENCOUNTER — HOSPITAL ENCOUNTER (OUTPATIENT)
Dept: RADIOLOGY | Facility: HOSPITAL | Age: 44
Discharge: HOME | End: 2019-05-06
Attending: SURGERY
Payer: COMMERCIAL

## 2019-05-06 ENCOUNTER — HOSPITAL ENCOUNTER (OUTPATIENT)
Dept: NON INVASIVE DIAGNOSTICS | Facility: HOSPITAL | Age: 44
Discharge: HOME | End: 2019-05-06
Attending: SURGERY
Payer: COMMERCIAL

## 2019-05-06 DIAGNOSIS — D05.12 BREAST NEOPLASM, TIS (DCIS), LEFT: ICD-10-CM

## 2019-05-06 LAB
ALBUMIN SERPL BCP-MCNC: 3.9 G/DL (ref 3.5–5)
ALP SERPL-CCNC: 49 U/L (ref 46–116)
ALT SERPL W P-5'-P-CCNC: 20 U/L (ref 12–78)
ANION GAP SERPL CALCULATED.3IONS-SCNC: 7 MMOL/L (ref 4–13)
APTT PPP: 33 SECONDS (ref 26–38)
AST SERPL W P-5'-P-CCNC: 16 U/L (ref 5–45)
ATRIAL RATE: 73 BPM
BASOPHILS # BLD AUTO: 0.01 THOUSANDS/ΜL (ref 0–0.1)
BASOPHILS NFR BLD AUTO: 0 % (ref 0–1)
BILIRUB SERPL-MCNC: 1.04 MG/DL (ref 0.2–1)
BILIRUB UR QL STRIP: NEGATIVE
BUN SERPL-MCNC: 15 MG/DL (ref 5–25)
CALCIUM SERPL-MCNC: 9.7 MG/DL (ref 8.3–10.1)
CHLORIDE SERPL-SCNC: 104 MMOL/L (ref 100–108)
CLARITY UR: CLEAR
CO2 SERPL-SCNC: 29 MMOL/L (ref 21–32)
COLOR UR: YELLOW
CREAT SERPL-MCNC: 0.81 MG/DL (ref 0.6–1.3)
EOSINOPHIL # BLD AUTO: 0.07 THOUSAND/ΜL (ref 0–0.61)
EOSINOPHIL NFR BLD AUTO: 1 % (ref 0–6)
ERYTHROCYTE [DISTWIDTH] IN BLOOD BY AUTOMATED COUNT: 12.1 % (ref 11.6–15.1)
GFR SERPL CREATININE-BSD FRML MDRD: 90 ML/MIN/1.73SQ M
GLUCOSE SERPL-MCNC: 84 MG/DL (ref 65–140)
GLUCOSE UR STRIP-MCNC: NEGATIVE MG/DL
HCT VFR BLD AUTO: 39.3 % (ref 34.8–46.1)
HGB BLD-MCNC: 12.6 G/DL (ref 11.5–15.4)
HGB UR QL STRIP.AUTO: NEGATIVE
IMM GRANULOCYTES # BLD AUTO: 0.01 THOUSAND/UL (ref 0–0.2)
IMM GRANULOCYTES NFR BLD AUTO: 0 % (ref 0–2)
INR PPP: 1.07 (ref 0.86–1.17)
KETONES UR STRIP-MCNC: NEGATIVE MG/DL
LEUKOCYTE ESTERASE UR QL STRIP: NEGATIVE
LYMPHOCYTES # BLD AUTO: 1.46 THOUSANDS/ΜL (ref 0.6–4.47)
LYMPHOCYTES NFR BLD AUTO: 28 % (ref 14–44)
MCH RBC QN AUTO: 28.8 PG (ref 26.8–34.3)
MCHC RBC AUTO-ENTMCNC: 32.1 G/DL (ref 31.4–37.4)
MCV RBC AUTO: 90 FL (ref 82–98)
MONOCYTES # BLD AUTO: 0.29 THOUSAND/ΜL (ref 0.17–1.22)
MONOCYTES NFR BLD AUTO: 6 % (ref 4–12)
NEUTROPHILS # BLD AUTO: 3.41 THOUSANDS/ΜL (ref 1.85–7.62)
NEUTS SEG NFR BLD AUTO: 65 % (ref 43–75)
NITRITE UR QL STRIP: NEGATIVE
NRBC BLD AUTO-RTO: 0 /100 WBCS
P AXIS: 73 DEGREES
PH UR STRIP.AUTO: 5.5 [PH]
PLATELET # BLD AUTO: 283 THOUSANDS/UL (ref 149–390)
PMV BLD AUTO: 10.4 FL (ref 8.9–12.7)
POTASSIUM SERPL-SCNC: 4.3 MMOL/L (ref 3.5–5.3)
PR INTERVAL: 160 MS
PROT SERPL-MCNC: 7.7 G/DL (ref 6.4–8.2)
PROT UR STRIP-MCNC: NEGATIVE MG/DL
PROTHROMBIN TIME: 14 SECONDS (ref 11.8–14.2)
QRS AXIS: 47 DEGREES
QRSD INTERVAL: 76 MS
QT INTERVAL: 374 MS
QTC INTERVAL: 412 MS
RBC # BLD AUTO: 4.38 MILLION/UL (ref 3.81–5.12)
SODIUM SERPL-SCNC: 140 MMOL/L (ref 136–145)
SP GR UR STRIP.AUTO: 1.02 (ref 1–1.03)
T WAVE AXIS: 55 DEGREES
UROBILINOGEN UR QL STRIP.AUTO: 0.2 E.U./DL
VENTRICULAR RATE: 73 BPM
WBC # BLD AUTO: 5.25 THOUSAND/UL (ref 4.31–10.16)

## 2019-05-06 PROCEDURE — 93005 ELECTROCARDIOGRAM TRACING: CPT | Performed by: NURSE PRACTITIONER

## 2019-05-06 PROCEDURE — 71046 X-RAY EXAM CHEST 2 VIEWS: CPT

## 2019-05-06 PROCEDURE — 85025 COMPLETE CBC W/AUTO DIFF WBC: CPT

## 2019-05-06 PROCEDURE — 81003 URINALYSIS AUTO W/O SCOPE: CPT | Performed by: SURGERY

## 2019-05-06 PROCEDURE — 93010 ELECTROCARDIOGRAM REPORT: CPT | Performed by: INTERNAL MEDICINE

## 2019-05-06 PROCEDURE — 85730 THROMBOPLASTIN TIME PARTIAL: CPT

## 2019-05-06 PROCEDURE — 36415 COLL VENOUS BLD VENIPUNCTURE: CPT

## 2019-05-06 PROCEDURE — 80053 COMPREHEN METABOLIC PANEL: CPT

## 2019-05-06 PROCEDURE — 85610 PROTHROMBIN TIME: CPT

## 2019-05-06 RX ORDER — IBUPROFEN 200 MG
TABLET ORAL EVERY 6 HOURS PRN
COMMUNITY
End: 2019-07-19 | Stop reason: ALTCHOICE

## 2019-05-06 RX ORDER — SODIUM CHLORIDE 9 MG/ML
125 INJECTION, SOLUTION INTRAVENOUS CONTINUOUS
Status: CANCELLED | OUTPATIENT
Start: 2019-05-17

## 2019-05-10 ENCOUNTER — DOCUMENTATION (OUTPATIENT)
Dept: HEMATOLOGY ONCOLOGY | Facility: CLINIC | Age: 44
End: 2019-05-10

## 2019-05-17 ENCOUNTER — HOSPITAL ENCOUNTER (INPATIENT)
Facility: HOSPITAL | Age: 44
LOS: 1 days | Discharge: HOME | DRG: 581 | End: 2019-05-18
Attending: SURGERY | Admitting: SURGERY
Payer: COMMERCIAL

## 2019-05-17 ENCOUNTER — ANESTHESIA (OUTPATIENT)
Dept: PERIOP | Facility: HOSPITAL | Age: 44
DRG: 581 | End: 2019-05-17
Payer: COMMERCIAL

## 2019-05-17 ENCOUNTER — HOSPITAL ENCOUNTER (OUTPATIENT)
Dept: NUCLEAR MEDICINE | Facility: HOSPITAL | Age: 44
Discharge: HOME | DRG: 581 | End: 2019-05-17
Attending: SURGERY
Payer: COMMERCIAL

## 2019-05-17 DIAGNOSIS — D05.12 BREAST NEOPLASM, TIS (DCIS), LEFT: ICD-10-CM

## 2019-05-17 LAB — EXT PREGNANCY TEST URINE: NEGATIVE

## 2019-05-17 PROCEDURE — 38525 BIOPSY/REMOVAL LYMPH NODES: CPT | Performed by: SURGERY

## 2019-05-17 PROCEDURE — C1781 MESH (IMPLANTABLE): HCPCS | Performed by: SURGERY

## 2019-05-17 PROCEDURE — 88341 IMHCHEM/IMCYTCHM EA ADD ANTB: CPT | Performed by: PATHOLOGY

## 2019-05-17 PROCEDURE — 78195 LYMPH SYSTEM IMAGING: CPT

## 2019-05-17 PROCEDURE — NC001 PR NO CHARGE: Performed by: PHYSICIAN ASSISTANT

## 2019-05-17 PROCEDURE — 19303 MAST SIMPLE COMPLETE: CPT | Performed by: SURGERY

## 2019-05-17 PROCEDURE — 81025 URINE PREGNANCY TEST: CPT | Performed by: ANESTHESIOLOGY

## 2019-05-17 PROCEDURE — 19303 MAST SIMPLE COMPLETE: CPT | Performed by: PHYSICIAN ASSISTANT

## 2019-05-17 PROCEDURE — 88307 TISSUE EXAM BY PATHOLOGIST: CPT | Performed by: PATHOLOGY

## 2019-05-17 PROCEDURE — 0HTU0ZZ RESECTION OF LEFT BREAST, OPEN APPROACH: ICD-10-PCS | Performed by: SURGERY

## 2019-05-17 PROCEDURE — 07B60ZX EXCISION OF LEFT AXILLARY LYMPHATIC, OPEN APPROACH, DIAGNOSTIC: ICD-10-PCS | Performed by: SURGERY

## 2019-05-17 PROCEDURE — 88342 IMHCHEM/IMCYTCHM 1ST ANTB: CPT | Performed by: PATHOLOGY

## 2019-05-17 PROCEDURE — A9541 TC99M SULFUR COLLOID: HCPCS

## 2019-05-17 PROCEDURE — C1789 PROSTHESIS, BREAST, IMP: HCPCS | Performed by: SURGERY

## 2019-05-17 PROCEDURE — 38900 IO MAP OF SENT LYMPH NODE: CPT | Performed by: SURGERY

## 2019-05-17 PROCEDURE — 0HHU0NZ INSERTION OF TISSUE EXPANDER INTO LEFT BREAST, OPEN APPROACH: ICD-10-PCS | Performed by: SURGERY

## 2019-05-17 DEVICE — IMPLANTABLE DEVICE: Type: IMPLANTABLE DEVICE | Status: FUNCTIONAL

## 2019-05-17 DEVICE — IMPLANTABLE DEVICE: Type: IMPLANTABLE DEVICE | Site: BREAST | Status: FUNCTIONAL

## 2019-05-17 RX ORDER — SODIUM CHLORIDE, SODIUM LACTATE, POTASSIUM CHLORIDE, CALCIUM CHLORIDE 600; 310; 30; 20 MG/100ML; MG/100ML; MG/100ML; MG/100ML
100 INJECTION, SOLUTION INTRAVENOUS CONTINUOUS
Status: DISCONTINUED | OUTPATIENT
Start: 2019-05-17 | End: 2019-05-18 | Stop reason: HOSPADM

## 2019-05-17 RX ORDER — SODIUM CHLORIDE 9 MG/ML
125 INJECTION, SOLUTION INTRAVENOUS CONTINUOUS
Status: DISCONTINUED | OUTPATIENT
Start: 2019-05-17 | End: 2019-05-18 | Stop reason: HOSPADM

## 2019-05-17 RX ORDER — HYDROCODONE BITARTRATE AND ACETAMINOPHEN 5; 325 MG/1; MG/1
1 TABLET ORAL EVERY 4 HOURS PRN
Status: DISCONTINUED | OUTPATIENT
Start: 2019-05-17 | End: 2019-05-18 | Stop reason: HOSPADM

## 2019-05-17 RX ORDER — PROPOFOL 10 MG/ML
INJECTION, EMULSION INTRAVENOUS AS NEEDED
Status: DISCONTINUED | OUTPATIENT
Start: 2019-05-17 | End: 2019-05-17 | Stop reason: SURG

## 2019-05-17 RX ORDER — HYDROMORPHONE HYDROCHLORIDE 2 MG/ML
INJECTION, SOLUTION INTRAMUSCULAR; INTRAVENOUS; SUBCUTANEOUS AS NEEDED
Status: DISCONTINUED | OUTPATIENT
Start: 2019-05-17 | End: 2019-05-17 | Stop reason: SURG

## 2019-05-17 RX ORDER — DIPHENHYDRAMINE HYDROCHLORIDE 50 MG/ML
25 INJECTION INTRAMUSCULAR; INTRAVENOUS EVERY 6 HOURS PRN
Status: DISCONTINUED | OUTPATIENT
Start: 2019-05-17 | End: 2019-05-18

## 2019-05-17 RX ORDER — CEFAZOLIN SODIUM 1 G/50ML
1000 SOLUTION INTRAVENOUS ONCE
Status: COMPLETED | OUTPATIENT
Start: 2019-05-17 | End: 2019-05-17

## 2019-05-17 RX ORDER — CLINDAMYCIN PHOSPHATE 900 MG/50ML
900 INJECTION INTRAVENOUS EVERY 8 HOURS
Status: DISCONTINUED | OUTPATIENT
Start: 2019-05-17 | End: 2019-05-18 | Stop reason: HOSPADM

## 2019-05-17 RX ORDER — NAPROXEN 500 MG/1
500 TABLET ORAL 2 TIMES DAILY WITH MEALS
Status: DISCONTINUED | OUTPATIENT
Start: 2019-05-17 | End: 2019-05-18 | Stop reason: HOSPADM

## 2019-05-17 RX ORDER — BUPIVACAINE HYDROCHLORIDE AND EPINEPHRINE 5; 5 MG/ML; UG/ML
INJECTION, SOLUTION PERINEURAL AS NEEDED
Status: DISCONTINUED | OUTPATIENT
Start: 2019-05-17 | End: 2019-05-17 | Stop reason: HOSPADM

## 2019-05-17 RX ORDER — ONDANSETRON 2 MG/ML
4 INJECTION INTRAMUSCULAR; INTRAVENOUS EVERY 6 HOURS PRN
Status: DISCONTINUED | OUTPATIENT
Start: 2019-05-17 | End: 2019-05-18 | Stop reason: HOSPADM

## 2019-05-17 RX ORDER — ONDANSETRON 2 MG/ML
4 INJECTION INTRAMUSCULAR; INTRAVENOUS ONCE AS NEEDED
Status: COMPLETED | OUTPATIENT
Start: 2019-05-17 | End: 2019-05-17

## 2019-05-17 RX ORDER — HYDROCODONE BITARTRATE AND ACETAMINOPHEN 5; 325 MG/1; MG/1
2 TABLET ORAL EVERY 4 HOURS PRN
Status: DISCONTINUED | OUTPATIENT
Start: 2019-05-17 | End: 2019-05-17

## 2019-05-17 RX ORDER — ONDANSETRON 2 MG/ML
INJECTION INTRAMUSCULAR; INTRAVENOUS AS NEEDED
Status: DISCONTINUED | OUTPATIENT
Start: 2019-05-17 | End: 2019-05-17 | Stop reason: SURG

## 2019-05-17 RX ORDER — LIDOCAINE HYDROCHLORIDE 10 MG/ML
INJECTION, SOLUTION INFILTRATION; PERINEURAL AS NEEDED
Status: DISCONTINUED | OUTPATIENT
Start: 2019-05-17 | End: 2019-05-17 | Stop reason: SURG

## 2019-05-17 RX ORDER — MIDAZOLAM HYDROCHLORIDE 1 MG/ML
INJECTION INTRAMUSCULAR; INTRAVENOUS AS NEEDED
Status: DISCONTINUED | OUTPATIENT
Start: 2019-05-17 | End: 2019-05-17 | Stop reason: SURG

## 2019-05-17 RX ORDER — PROMETHAZINE HYDROCHLORIDE 25 MG/ML
25 INJECTION, SOLUTION INTRAMUSCULAR; INTRAVENOUS EVERY 6 HOURS PRN
Status: DISCONTINUED | OUTPATIENT
Start: 2019-05-17 | End: 2019-05-18 | Stop reason: HOSPADM

## 2019-05-17 RX ORDER — HYDROMORPHONE HCL/PF 1 MG/ML
0.5 SYRINGE (ML) INJECTION
Status: DISCONTINUED | OUTPATIENT
Start: 2019-05-17 | End: 2019-05-18 | Stop reason: HOSPADM

## 2019-05-17 RX ORDER — ACETAMINOPHEN 500 MG
500 TABLET ORAL EVERY 6 HOURS PRN
COMMUNITY
End: 2019-07-19 | Stop reason: ALTCHOICE

## 2019-05-17 RX ORDER — HYDROCODONE BITARTRATE AND ACETAMINOPHEN 5; 325 MG/1; MG/1
2 TABLET ORAL EVERY 4 HOURS PRN
Status: DISCONTINUED | OUTPATIENT
Start: 2019-05-17 | End: 2019-05-18 | Stop reason: HOSPADM

## 2019-05-17 RX ORDER — ROCURONIUM BROMIDE 10 MG/ML
INJECTION, SOLUTION INTRAVENOUS AS NEEDED
Status: DISCONTINUED | OUTPATIENT
Start: 2019-05-17 | End: 2019-05-17 | Stop reason: SURG

## 2019-05-17 RX ORDER — DOCUSATE SODIUM 100 MG/1
100 CAPSULE, LIQUID FILLED ORAL 2 TIMES DAILY
Status: DISCONTINUED | OUTPATIENT
Start: 2019-05-17 | End: 2019-05-18 | Stop reason: HOSPADM

## 2019-05-17 RX ORDER — MEPERIDINE HYDROCHLORIDE 50 MG/ML
12.5 INJECTION INTRAMUSCULAR; INTRAVENOUS; SUBCUTANEOUS ONCE
Status: COMPLETED | OUTPATIENT
Start: 2019-05-17 | End: 2019-05-17

## 2019-05-17 RX ORDER — FENTANYL CITRATE/PF 50 MCG/ML
50 SYRINGE (ML) INJECTION
Status: COMPLETED | OUTPATIENT
Start: 2019-05-17 | End: 2019-05-17

## 2019-05-17 RX ORDER — GLYCOPYRROLATE 0.2 MG/ML
INJECTION INTRAMUSCULAR; INTRAVENOUS AS NEEDED
Status: DISCONTINUED | OUTPATIENT
Start: 2019-05-17 | End: 2019-05-17 | Stop reason: SURG

## 2019-05-17 RX ORDER — ISOSULFAN BLUE 50 MG/5ML
INJECTION, SOLUTION SUBCUTANEOUS AS NEEDED
Status: DISCONTINUED | OUTPATIENT
Start: 2019-05-17 | End: 2019-05-17 | Stop reason: HOSPADM

## 2019-05-17 RX ORDER — MAGNESIUM HYDROXIDE/ALUMINUM HYDROXICE/SIMETHICONE 120; 1200; 1200 MG/30ML; MG/30ML; MG/30ML
30 SUSPENSION ORAL EVERY 6 HOURS PRN
Status: DISCONTINUED | OUTPATIENT
Start: 2019-05-17 | End: 2019-05-18 | Stop reason: HOSPADM

## 2019-05-17 RX ORDER — ACETAMINOPHEN 325 MG/1
975 TABLET ORAL EVERY 8 HOURS PRN
Status: DISCONTINUED | OUTPATIENT
Start: 2019-05-17 | End: 2019-05-18 | Stop reason: HOSPADM

## 2019-05-17 RX ORDER — FENTANYL CITRATE 50 UG/ML
INJECTION, SOLUTION INTRAMUSCULAR; INTRAVENOUS AS NEEDED
Status: DISCONTINUED | OUTPATIENT
Start: 2019-05-17 | End: 2019-05-17 | Stop reason: SURG

## 2019-05-17 RX ORDER — DEXAMETHASONE SODIUM PHOSPHATE 10 MG/ML
INJECTION, SOLUTION INTRAMUSCULAR; INTRAVENOUS AS NEEDED
Status: DISCONTINUED | OUTPATIENT
Start: 2019-05-17 | End: 2019-05-17 | Stop reason: SURG

## 2019-05-17 RX ORDER — NEOSTIGMINE METHYLSULFATE 1 MG/ML
INJECTION INTRAVENOUS AS NEEDED
Status: DISCONTINUED | OUTPATIENT
Start: 2019-05-17 | End: 2019-05-17 | Stop reason: SURG

## 2019-05-17 RX ADMIN — SODIUM CHLORIDE, SODIUM LACTATE, POTASSIUM CHLORIDE, AND CALCIUM CHLORIDE 100 ML/HR: .6; .31; .03; .02 INJECTION, SOLUTION INTRAVENOUS at 14:45

## 2019-05-17 RX ADMIN — ONDANSETRON 4 MG: 2 INJECTION INTRAMUSCULAR; INTRAVENOUS at 18:40

## 2019-05-17 RX ADMIN — FENTANYL CITRATE 100 MCG: 50 INJECTION, SOLUTION INTRAMUSCULAR; INTRAVENOUS at 09:10

## 2019-05-17 RX ADMIN — ONDANSETRON HYDROCHLORIDE 8 MG: 2 INJECTION, SOLUTION INTRAVENOUS at 09:25

## 2019-05-17 RX ADMIN — ROCURONIUM BROMIDE 50 MG: 10 INJECTION, SOLUTION INTRAVENOUS at 09:10

## 2019-05-17 RX ADMIN — LIDOCAINE HYDROCHLORIDE 60 MG: 10 INJECTION, SOLUTION INFILTRATION; PERINEURAL at 09:10

## 2019-05-17 RX ADMIN — FENTANYL CITRATE 50 MCG: 50 INJECTION, SOLUTION INTRAMUSCULAR; INTRAVENOUS at 12:57

## 2019-05-17 RX ADMIN — SODIUM CHLORIDE: 0.9 INJECTION, SOLUTION INTRAVENOUS at 09:46

## 2019-05-17 RX ADMIN — GLYCOPYRROLATE 0.4 MG: 0.2 INJECTION INTRAMUSCULAR; INTRAVENOUS at 11:56

## 2019-05-17 RX ADMIN — FENTANYL CITRATE 50 MCG: 50 INJECTION, SOLUTION INTRAMUSCULAR; INTRAVENOUS at 11:39

## 2019-05-17 RX ADMIN — SODIUM CHLORIDE 125 ML/HR: 0.9 INJECTION, SOLUTION INTRAVENOUS at 06:59

## 2019-05-17 RX ADMIN — CLINDAMYCIN PHOSPHATE 900 MG: 900 INJECTION, SOLUTION INTRAVENOUS at 15:51

## 2019-05-17 RX ADMIN — TRIMETHOBENZAMIDE HYDROCHLORIDE 200 MG: 100 INJECTION INTRAMUSCULAR at 14:07

## 2019-05-17 RX ADMIN — FENTANYL CITRATE 50 MCG: 50 INJECTION, SOLUTION INTRAMUSCULAR; INTRAVENOUS at 09:32

## 2019-05-17 RX ADMIN — FENTANYL CITRATE 50 MCG: 50 INJECTION, SOLUTION INTRAMUSCULAR; INTRAVENOUS at 12:46

## 2019-05-17 RX ADMIN — FENTANYL CITRATE 50 MCG: 50 INJECTION, SOLUTION INTRAMUSCULAR; INTRAVENOUS at 12:34

## 2019-05-17 RX ADMIN — DEXAMETHASONE SODIUM PHOSPHATE 8 MG: 10 INJECTION, SOLUTION INTRAMUSCULAR; INTRAVENOUS at 09:25

## 2019-05-17 RX ADMIN — NEOSTIGMINE METHYLSULFATE 3 MG: 1 INJECTION, SOLUTION INTRAVENOUS at 11:56

## 2019-05-17 RX ADMIN — MEPERIDINE HYDROCHLORIDE 12.5 MG: 50 INJECTION INTRAMUSCULAR; INTRAVENOUS; SUBCUTANEOUS at 13:56

## 2019-05-17 RX ADMIN — FENTANYL CITRATE 50 MCG: 50 INJECTION, SOLUTION INTRAMUSCULAR; INTRAVENOUS at 13:09

## 2019-05-17 RX ADMIN — DOCUSATE SODIUM 100 MG: 100 CAPSULE, LIQUID FILLED ORAL at 18:28

## 2019-05-17 RX ADMIN — HYDROCODONE BITARTRATE AND ACETAMINOPHEN 1 TABLET: 5; 325 TABLET ORAL at 18:27

## 2019-05-17 RX ADMIN — SODIUM CHLORIDE: 0.9 INJECTION, SOLUTION INTRAVENOUS at 12:06

## 2019-05-17 RX ADMIN — ONDANSETRON 4 MG: 2 INJECTION INTRAMUSCULAR; INTRAVENOUS at 12:39

## 2019-05-17 RX ADMIN — MIDAZOLAM 2 MG: 1 INJECTION INTRAMUSCULAR; INTRAVENOUS at 09:03

## 2019-05-17 RX ADMIN — PROPOFOL 200 MG: 10 INJECTION, EMULSION INTRAVENOUS at 09:10

## 2019-05-17 RX ADMIN — CLINDAMYCIN PHOSPHATE 900 MG: 900 INJECTION, SOLUTION INTRAVENOUS at 22:56

## 2019-05-17 RX ADMIN — NAPROXEN 500 MG: 500 TABLET ORAL at 15:51

## 2019-05-17 RX ADMIN — ENOXAPARIN SODIUM 30 MG: 30 INJECTION SUBCUTANEOUS at 07:34

## 2019-05-17 RX ADMIN — ROCURONIUM BROMIDE 30 MG: 10 INJECTION, SOLUTION INTRAVENOUS at 10:55

## 2019-05-17 RX ADMIN — HYDROMORPHONE HYDROCHLORIDE 0.5 MG: 2 INJECTION, SOLUTION INTRAMUSCULAR; INTRAVENOUS; SUBCUTANEOUS at 09:25

## 2019-05-17 RX ADMIN — CEFAZOLIN SODIUM 1000 MG: 1 SOLUTION INTRAVENOUS at 09:03

## 2019-05-18 VITALS
HEART RATE: 76 BPM | TEMPERATURE: 98.4 F | DIASTOLIC BLOOD PRESSURE: 70 MMHG | RESPIRATION RATE: 16 BRPM | WEIGHT: 136 LBS | OXYGEN SATURATION: 99 % | HEIGHT: 64 IN | BODY MASS INDEX: 23.22 KG/M2 | SYSTOLIC BLOOD PRESSURE: 90 MMHG

## 2019-05-18 RX ORDER — DIPHENHYDRAMINE HCL 25 MG
25 TABLET ORAL EVERY 6 HOURS PRN
Status: DISCONTINUED | OUTPATIENT
Start: 2019-05-18 | End: 2019-05-18 | Stop reason: HOSPADM

## 2019-05-18 RX ADMIN — SODIUM CHLORIDE, SODIUM LACTATE, POTASSIUM CHLORIDE, AND CALCIUM CHLORIDE 100 ML/HR: .6; .31; .03; .02 INJECTION, SOLUTION INTRAVENOUS at 02:02

## 2019-05-18 RX ADMIN — ACETAMINOPHEN 975 MG: 325 TABLET ORAL at 00:06

## 2019-05-18 RX ADMIN — NAPROXEN 500 MG: 500 TABLET ORAL at 09:17

## 2019-05-18 RX ADMIN — DOCUSATE SODIUM 100 MG: 100 CAPSULE, LIQUID FILLED ORAL at 09:17

## 2019-05-18 RX ADMIN — CLINDAMYCIN PHOSPHATE 900 MG: 900 INJECTION, SOLUTION INTRAVENOUS at 05:48

## 2019-05-18 RX ADMIN — HYDROCODONE BITARTRATE AND ACETAMINOPHEN 1 TABLET: 5; 325 TABLET ORAL at 05:48

## 2019-05-20 ENCOUNTER — DOCUMENTATION (OUTPATIENT)
Dept: HEMATOLOGY ONCOLOGY | Facility: CLINIC | Age: 44
End: 2019-05-20

## 2019-05-20 ENCOUNTER — TRANSITIONAL CARE MANAGEMENT (OUTPATIENT)
Dept: FAMILY MEDICINE CLINIC | Facility: CLINIC | Age: 44
End: 2019-05-20

## 2019-05-21 ENCOUNTER — TELEPHONE (OUTPATIENT)
Dept: SURGICAL ONCOLOGY | Facility: CLINIC | Age: 44
End: 2019-05-21

## 2019-05-21 ENCOUNTER — TRANSITIONAL CARE MANAGEMENT (OUTPATIENT)
Dept: FAMILY MEDICINE CLINIC | Facility: CLINIC | Age: 44
End: 2019-05-21

## 2019-05-22 DIAGNOSIS — Z71.89 COMPLEX CARE COORDINATION: Primary | ICD-10-CM

## 2019-05-23 ENCOUNTER — PATIENT OUTREACH (OUTPATIENT)
Dept: FAMILY MEDICINE CLINIC | Facility: CLINIC | Age: 44
End: 2019-05-23

## 2019-05-28 ENCOUNTER — TELEPHONE (OUTPATIENT)
Dept: FAMILY MEDICINE CLINIC | Facility: CLINIC | Age: 44
End: 2019-05-28

## 2019-05-28 ENCOUNTER — PATIENT OUTREACH (OUTPATIENT)
Dept: FAMILY MEDICINE CLINIC | Facility: CLINIC | Age: 44
End: 2019-05-28

## 2019-05-28 ENCOUNTER — TELEPHONE (OUTPATIENT)
Dept: SURGICAL ONCOLOGY | Facility: CLINIC | Age: 44
End: 2019-05-28

## 2019-05-28 DIAGNOSIS — R39.89 SUSPECTED UTI: Primary | ICD-10-CM

## 2019-05-29 ENCOUNTER — OFFICE VISIT (OUTPATIENT)
Dept: SURGICAL ONCOLOGY | Facility: CLINIC | Age: 44
End: 2019-05-29

## 2019-05-29 VITALS
DIASTOLIC BLOOD PRESSURE: 72 MMHG | TEMPERATURE: 99 F | HEIGHT: 64 IN | WEIGHT: 136.4 LBS | SYSTOLIC BLOOD PRESSURE: 110 MMHG | HEART RATE: 102 BPM | BODY MASS INDEX: 23.29 KG/M2 | RESPIRATION RATE: 14 BRPM

## 2019-05-29 DIAGNOSIS — D05.12 BREAST NEOPLASM, TIS (DCIS), LEFT: Primary | ICD-10-CM

## 2019-05-29 PROCEDURE — 99024 POSTOP FOLLOW-UP VISIT: CPT | Performed by: SURGERY

## 2019-06-04 ENCOUNTER — PATIENT OUTREACH (OUTPATIENT)
Dept: FAMILY MEDICINE CLINIC | Facility: CLINIC | Age: 44
End: 2019-06-04

## 2019-06-25 ENCOUNTER — PATIENT OUTREACH (OUTPATIENT)
Dept: FAMILY MEDICINE CLINIC | Facility: CLINIC | Age: 44
End: 2019-06-25

## 2019-07-19 ENCOUNTER — OFFICE VISIT (OUTPATIENT)
Dept: FAMILY MEDICINE CLINIC | Facility: CLINIC | Age: 44
End: 2019-07-19
Payer: COMMERCIAL

## 2019-07-19 VITALS
WEIGHT: 139.5 LBS | SYSTOLIC BLOOD PRESSURE: 100 MMHG | HEART RATE: 66 BPM | DIASTOLIC BLOOD PRESSURE: 62 MMHG | TEMPERATURE: 97.5 F | BODY MASS INDEX: 23.82 KG/M2 | OXYGEN SATURATION: 98 % | HEIGHT: 64 IN

## 2019-07-19 DIAGNOSIS — Z13.0 SCREENING FOR ENDOCRINE, METABOLIC AND IMMUNITY DISORDER: ICD-10-CM

## 2019-07-19 DIAGNOSIS — Z00.00 ANNUAL PHYSICAL EXAM: Primary | ICD-10-CM

## 2019-07-19 DIAGNOSIS — Z13.29 SCREENING FOR ENDOCRINE, METABOLIC AND IMMUNITY DISORDER: ICD-10-CM

## 2019-07-19 DIAGNOSIS — Z13.228 SCREENING FOR ENDOCRINE, METABOLIC AND IMMUNITY DISORDER: ICD-10-CM

## 2019-07-19 DIAGNOSIS — Z11.59 ENCOUNTER FOR HEPATITIS C SCREENING TEST FOR LOW RISK PATIENT: ICD-10-CM

## 2019-07-19 PROCEDURE — 99396 PREV VISIT EST AGE 40-64: CPT | Performed by: FAMILY MEDICINE

## 2019-07-19 PROCEDURE — 86706 HEP B SURFACE ANTIBODY: CPT | Performed by: FAMILY MEDICINE

## 2019-07-19 PROCEDURE — 36415 COLL VENOUS BLD VENIPUNCTURE: CPT | Performed by: FAMILY MEDICINE

## 2019-07-19 PROCEDURE — 86803 HEPATITIS C AB TEST: CPT | Performed by: FAMILY MEDICINE

## 2019-07-19 NOTE — PATIENT INSTRUCTIONS

## 2019-07-19 NOTE — LETTER
July 19, 2019     Patient: Newton White   YOB: 1975   Date of Visit: 7/19/2019       To Whom it May Concern:    Lissy Bah is under my professional care  She was seen in my office on 7/19/2019  She had a normal physical today and she is cleared for any and all school work  She does have a history of receiving three hepatitis-B vaccines the we do not have the dates  We are drawing a hepatitis-B titer today which will be back soon  She is also having her hepatitis C test antibody drawn today  She did have immunity to measles, mumps, rubella, and varicella in December 2018  She had a negative QuantiFERON at that time as well which I feel is a better test than a two step PPD so she should not need a two-step PPD  If you have any questions or concerns, please don't hesitate to call           Sincerely,              Jani Bills MD

## 2019-07-19 NOTE — PROGRESS NOTES
Hep   ADULT ANNUAL PHYSICAL  [facility]    NAME: Lynne Riley  AGE: 41 y o  SEX: female  : 1975     DATE: 2019     Assessment and Plan:     Problem List Items Addressed This Visit     None      Visit Diagnoses     Annual physical exam    -  Primary    Encounter for hepatitis C screening test for low risk patient        Relevant Orders    Hepatitis C antibody    Screening for endocrine, metabolic and immunity disorder        Relevant Orders    Hepatitis B surface antibody          Immunizations and preventive care screenings were discussed with patient today  Appropriate education was printed on patient's after visit summary  Counseling:  · Dental Health: discussed importance of regular tooth brushing, flossing, and dental visits  Return in 1 year (on 2020)  Chief Complaint:     No chief complaint on file  History of Present Illness:     Adult Annual Physical   Patient here for a comprehensive physical exam  The patient reports no problems  Diet and Physical Activity  · Diet/Nutrition: well balanced diet  · Exercise: 1-2 times a week on average  Depression Screening  PHQ-9 Depression Screening    PHQ-9:    Frequency of the following problems over the past two weeks:            General Health  · Sleep: sleeps well  · Hearing: normal - bilateral   · Vision: no vision problems  · Dental: regular dental visits  /GYN Health  · Patient is: premenopausal  · Periods are very regular  ·    Review of Systems:     Review of Systems   Constitutional: Negative for chills, fever and unexpected weight change  HENT: Negative for congestion, ear pain, hearing loss, postnasal drip, rhinorrhea and sore throat  Eyes: Negative for visual disturbance  Respiratory: Negative for cough and shortness of breath  Cardiovascular: Negative for chest pain  Gastrointestinal: Negative for abdominal pain, constipation and diarrhea  Genitourinary: Negative for difficulty urinating  Musculoskeletal: Negative for arthralgias and gait problem  Skin: Negative for rash  Neurological: Negative for light-headedness and headaches  Psychiatric/Behavioral: Negative for dysphoric mood and sleep disturbance  The patient is not nervous/anxious  Past Medical History:     Past Medical History:   Diagnosis Date    Anemia     slight    Anesthesia     "after deviated septum surgery area just behind front teeth(incisive papilla) became very inflamed and sore/required prednisone"    Anxiety     Balance problem     "when experiencing dizziness"    Cancer [facility])     left breast cancer    Dental crown present     Deviated septum     Difficulty swallowing     "at times"    Dizzy spells     "working with PCP"    Ear problem     "damage noted unknown cause, Right"    Eye pain     Fatigue     Generalized body aches     GERD (gastroesophageal reflux disease)     Nausea     "with dizziness"    Night sweat     Nipple discharge     left    Palpitations     Shortness of breath     "randomly"    Sinus infection     "sinus issues/not currently on antibiotic"    Weakness       Past Surgical History:     Past Surgical History:   Procedure Laterality Date    INSERTION OF BREAST TISSUE EXPANDER Left 5/17/2019    Procedure: INSERTION/PLACEMENT TISSUE EXPANDER (EXCHANGE);   Surgeon: Tracie Mccain MD;  Location: [facility];  Service: Plastics    LEG SURGERY Left     cyst removed from leg    MASTECTOMY W/ SENTINEL NODE BIOPSY Left 5/17/2019    Procedure: MASTECTOMY WITH BIOPSY LYMPH NODE SENTINEL; NUC MED 0730;  Surgeon: Cindy Sidhu MD;  Location[facility];  Service: Surgical Oncology    LA IMPLNT BIO IMPLNT FOR SOFT TISSUE REINFORCEMENT Left 5/17/2019    Procedure: RECONSTRUCTION BREAST W/ IMPLANT;  Surgeon: Tracie Mccain MD;  Location: [facility];  Service: Plastics    SINUS SURGERY      US GUIDANCE BREAST BIOPSY RIGHT EACH ADDITIONAL Right 2019    US GUIDED BREAST BIOPSY LEFT COMPLETE Left 2019    WISDOM TOOTH EXTRACTION        Social History:     Social History     Socioeconomic History    Marital status: /Civil Union     Spouse name: None    Number of children: None    Years of education: None    Highest education level: None   Occupational History    None   Social Needs    Financial resource strain: None    Food insecurity:     Worry: None     Inability: None    Transportation needs:     Medical: None     Non-medical: None   Tobacco Use    Smoking status: Former Smoker     Years: 7 00     Last attempt to quit:      Years since quittin 5    Smokeless tobacco: Never Used    Tobacco comment: was a casual former smoker    Substance and Sexual Activity    Alcohol use: Yes     Binge frequency: Weekly     Comment: 7-10 drinks per week     Drug use: No    Sexual activity: None   Lifestyle    Physical activity:     Days per week: None     Minutes per session: None    Stress: None   Relationships    Social connections:     Talks on phone: None     Gets together: None     Attends Shinto service: None     Active member of club or organization: None     Attends meetings of clubs or organizations: None     Relationship status: None    Intimate partner violence:     Fear of current or ex partner: None     Emotionally abused: None     Physically abused: None     Forced sexual activity: None   Other Topics Concern    None   Social History Narrative    Pt's menstrual flow is regular lasting 5 days   Cycle is 30-31 days    Pt has had one pregnancy    Last PAP 10/2018    Last mammo was       Family History:     Family History   Problem Relation Age of Onset    Melanoma Maternal Grandfather 79    Leukemia Maternal Grandmother 80    Glaucoma Other       Current Medications:     Current Outpatient Medications   Medication Sig Dispense Refill    diazepam (VALIUM) 5 mg tablet Take 1 tablet (5 mg total) by mouth every 6 (six) hours as needed for anxiety 15 tablet 0     No current facility-administered medications for this visit  Allergies:     No Known Allergies   Physical Exam:     /62   Pulse 66   Temp 97 5 °F (36 4 °C)   Ht 5' 4" (1 626 m)   Wt 63 3 kg (139 lb 8 oz)   SpO2 98%   BMI 23 95 kg/m²     Physical Exam   Constitutional: She is oriented to person, place, and time  She appears well-developed and well-nourished  HENT:   Head: Normocephalic and atraumatic  Right Ear: External ear normal    Left Ear: External ear normal    Nose: Nose normal    Mouth/Throat: Oropharynx is clear and moist    Eyes: Pupils are equal, round, and reactive to light  Conjunctivae and EOM are normal    Neck: Normal range of motion  Neck supple  No thyroid mass and no thyromegaly present  Cardiovascular: Normal rate, regular rhythm and normal heart sounds  Pulmonary/Chest: Effort normal and breath sounds normal    Abdominal: Soft  Normal appearance  There is no tenderness  Musculoskeletal: Normal range of motion  She exhibits no edema  Lymphadenopathy:     She has no cervical adenopathy  Right: No supraclavicular adenopathy present  Neurological: She is alert and oriented to person, place, and time  Skin: Skin is warm, dry and intact  Psychiatric: She has a normal mood and affect  Her behavior is normal  Judgment and thought content normal    Nursing note and vitals reviewed        MD William Azul

## 2019-07-20 LAB
HBV SURFACE AB SER-ACNC: 28.84 MIU/ML
HCV AB SER QL: NORMAL

## 2019-08-06 DIAGNOSIS — Z01.84 ENCOUNTER FOR IMMUNOLOGICAL TEST: Primary | ICD-10-CM

## 2019-08-08 ENCOUNTER — CLINICAL SUPPORT (OUTPATIENT)
Dept: FAMILY MEDICINE CLINIC | Facility: CLINIC | Age: 44
End: 2019-08-08
Payer: COMMERCIAL

## 2019-08-08 DIAGNOSIS — Z01.84 ENCOUNTER FOR IMMUNOLOGICAL TEST: Primary | ICD-10-CM

## 2019-08-08 PROCEDURE — 86658 ENTEROVIRUS ANTIBODY: CPT | Performed by: FAMILY MEDICINE

## 2019-08-08 PROCEDURE — 36415 COLL VENOUS BLD VENIPUNCTURE: CPT | Performed by: FAMILY MEDICINE

## 2019-08-12 ENCOUNTER — OFFICE VISIT (OUTPATIENT)
Dept: PHYSICAL THERAPY | Facility: REHABILITATION | Age: 44
End: 2019-08-12
Payer: COMMERCIAL

## 2019-08-12 DIAGNOSIS — M54.2 CERVICAL PAIN (NECK): Primary | ICD-10-CM

## 2019-08-12 PROCEDURE — 97112 NEUROMUSCULAR REEDUCATION: CPT | Performed by: PHYSICAL THERAPIST

## 2019-08-12 PROCEDURE — 97140 MANUAL THERAPY 1/> REGIONS: CPT | Performed by: PHYSICAL THERAPIST

## 2019-08-12 PROCEDURE — 97162 PT EVAL MOD COMPLEX 30 MIN: CPT | Performed by: PHYSICAL THERAPIST

## 2019-08-12 NOTE — PROGRESS NOTES
PT Evaluation     Today's date: 2019  Patient name: Jessenia James  : 1975  MRN: [id number]  Referring provider: Shakira Stallworth PT  Dx:   Encounter Diagnosis     ICD-10-CM    1  Cervical pain (neck) M54 2                   Assessment  Assessment details: Jessenia James is a 41 y o  female with significant medical history of inner ear dysfunction and recent breast cancer treated with mastectomy who presents with chief complaint of cervicogenic dizziness  Lynne presents with evaluation findings of decreased DNF strength, suboccipital tightness, TCJ hypomobility and decreased postural strength  These deficits are manifested functionally in difficulty turning her neck and dizziness throughout the day that results in chronic NSAID use  Lynne will benefit from physical therapy to improve DNF strength and overall postural strength to allow for improve movement throughout her day  Impairments: abnormal muscle firing, abnormal or restricted ROM, impaired physical strength and pain with function  Understanding of Dx/Px/POC: good   Prognosis: good    Goals  Short Term  1: Patient will report a 50% decrease in pain in 2-4 weeks  2: Pt will have DNF strength of >20 seconds in 4 weeks  Long Term  1: Patient will demonstrate independence in home exercise program by discharge  2: Patient will have FOTO score of 70 indicating improved function in 8 weeks  Plan  Patient would benefit from: skilled physical therapy  Planned therapy interventions: joint mobilization, manual therapy, postural training, patient education, neuromuscular re-education, strengthening, stretching, therapeutic exercise and home exercise program  Frequency: 2x week  Duration in weeks: 12  Treatment plan discussed with: patient        Subjective Evaluation    History of Present Illness  Mechanism of injury: Pt presents for evaluation of dizziness   States that she has noticed the dizziness decreased with use of NSAID, however her NSAIDs are starting to bother her stomach  Feels that the dizziness may be related to cervical musculature  States that she feels like she has had a stiff neck for a while  States that this was present prior to her recent surgery  Prior to hr use of Aleve she always attributed her dizziness to her inner ear damage  She has a vascular loop which was found via imaging  She has had both an MRI and CT scan  Pt has history of sinus surgery in 2015 for a deviated symptom which also changed her sensation  She has dizziness daily, it is not room spinning dizziness but feels more like "sea legs" dizzy  Feels there may be some weakness in her lower back and has to take a wide base of support  Denies lightheadedness, no headaches, no drop attacks, does feel like her vision is not crisp  States that she does have neck pain  More when she moves to the left then to the R  Feel more comfort leaning to the R  Has increased pain with different strains with lifting at work and if she sleeps different ways  Pain  Current pain ratin  At best pain ratin  At worst pain ratin    Patient Goals  Patient goals for therapy: decreased pain and increased strength          Objective     Concurrent Complaints  Positive for dizziness and history of cancer  Negative for night pain, faints, headaches, trouble swallowing, visual change and history of trauma    Postural Observations  Seated posture: fair  Standing posture: fair    Additional Postural Observation Details  Forward shoulders- not slumped  Palpation   Left   Tenderness of the scalenes, suboccipitals and upper trapezius  Right   Tenderness of the scalenes, suboccipitals and upper trapezius  Tenderness   Cervical Spine   Tenderness in the left ribs and right ribs  No tenderness in the facet joint and spinous process  Active Range of Motion     Additional Active Range of Motion Details  Limited in flexion, pain on the L with R sided maneuvers   Extension WNL but tight  Joint Play   Joints within functional limits: C1, C2, C3, C4, C5, C6, T5, T6 and T7     Hypomobile: C7, T1, T2, T3, T4, 1st rib and 2nd rib     Strength/Myotome Testing     Left Shoulder     Isolated Muscles   Middle trapezius: 3     Right Shoulder     Isolated Muscles   Middle trapezius: 3     Additional Strength Details  DNF endurance: 2 seconds  Tests   Cervical   Positive neck flexor muscle endurance test   Negative vertical compression, alar ligament test, Sharp-Kyaw test, transverse ligament test and VBI  Left   Negative Spurling's Test B  Right   Negative Spurling's Test B  Lumbar   Negative vertical compression  General Comments:      Shoulder Comments   Decreased L sided overhead motion- recent L sided mastectomy with current spacer placed  Cervical/Thoracic Comments  TA contraction WNL  Pt compensations for glute activation with lumbar extension     Saccades WNL  Convergence WNL    Neuro Exam:     Headaches   Patient reports headaches: No    Graphical documentation      Flowsheet Rows      Most Recent Value   PT/OT G-Codes   Current Score  61   Projected Score  70             Precautions: recent mastectomy L side      Manual  8/12            Suboccipital release TRISTIAN            Upper trap TPR TRISTIAN            1st rib mobilization TRISTIAN            CTJ mobilization                              Exercise Diary  8/12            DNF  10x5"            BL ER OTB 2x10            T's             Foam roll             t-band rows             t-band extensions                                                                                                                                                                                                       Modalities

## 2019-08-14 LAB
CV B1 AB TITR SER CF: NEGATIVE {TITER}
CV B2 AB TITR SER CF: NEGATIVE {TITER}
CV B3 AB TITR SER CF: NEGATIVE {TITER}
CV B4 AB TITR SER CF: ABNORMAL {TITER}
CV B5 AB TITR SER CF: NEGATIVE {TITER}
CV B6 AB TITR SER CF: NEGATIVE {TITER}
PV AB SER-ACNC: ABNORMAL

## 2019-08-15 ENCOUNTER — TELEPHONE (OUTPATIENT)
Dept: FAMILY MEDICINE CLINIC | Facility: CLINIC | Age: 44
End: 2019-08-15

## 2019-08-15 NOTE — TELEPHONE ENCOUNTER
Patient called asking of the results of her recent titer  She needs it resulted asap as she starts school next week  Please advise, thanks   She would also like this to be released to her mychart

## 2019-08-16 NOTE — TELEPHONE ENCOUNTER
Sent her message through [employer] and put task in to ryan that polio titer is positive- immune  Not sure if she needs a copy

## 2019-08-19 ENCOUNTER — APPOINTMENT (OUTPATIENT)
Dept: PHYSICAL THERAPY | Facility: REHABILITATION | Age: 44
End: 2019-08-19
Payer: COMMERCIAL

## 2019-08-19 NOTE — PROGRESS NOTES
Daily Note     Today's date: 2019  Patient name: Maurilio Scott  : 1975  MRN: [id number]  Referring provider: Samuel Pinto, PT  Dx:   Encounter Diagnosis     ICD-10-CM    1  Cervical pain (neck) M54 2                   Subjective: Pt reports that she has been performing HEP which is no longer challenging  States she has seen minimal change in her symptoms but does notice a feeling in her neck when she lays down at night which she attributes to changing her movements  Objective: See treatment diary below      Assessment: Pt presents with improved first rib mobility this visit  CTJ remains hypomobile  Pt challenged to maintain cervical neutral with NR today  Tolerated treatment well  Patient demonstrated fatigue post treatment and would benefit from continued PT      Plan: Progress treatment as tolerated         Precautions: recent mastectomy L side      Manual             Suboccipital release TRISTIAN TRISTIAN           Upper trap TPR TRISTIAN TRISTIAN           1st rib mobilization TRISTIAN TRISTIAN           CTJ mobilization  TRISTIAN                            Exercise Diary             DNF  10x5"            BL ER OTB 2x10            T's             Foam roll             t-band rows  MTB 2x15           t-band extensions  BTB 2x10           Chin tucks with abduction  2x10 OTB           Wall slides  2x8           Self thoracic SNAG  HEP                                                                                                                                                              Modalities

## 2019-08-20 ENCOUNTER — OFFICE VISIT (OUTPATIENT)
Dept: SURGICAL ONCOLOGY | Facility: CLINIC | Age: 44
End: 2019-08-20
Payer: COMMERCIAL

## 2019-08-20 ENCOUNTER — OFFICE VISIT (OUTPATIENT)
Dept: PHYSICAL THERAPY | Facility: REHABILITATION | Age: 44
End: 2019-08-20
Payer: COMMERCIAL

## 2019-08-20 VITALS
DIASTOLIC BLOOD PRESSURE: 70 MMHG | TEMPERATURE: 99.3 F | HEART RATE: 72 BPM | RESPIRATION RATE: 16 BRPM | WEIGHT: 140 LBS | HEIGHT: 64 IN | SYSTOLIC BLOOD PRESSURE: 100 MMHG | BODY MASS INDEX: 23.9 KG/M2

## 2019-08-20 DIAGNOSIS — Z08 ENCOUNTER FOR FOLLOW-UP SURVEILLANCE OF BREAST CANCER: Primary | ICD-10-CM

## 2019-08-20 DIAGNOSIS — Z86.000 PERSONAL HISTORY OF IN-SITU NEOPLASM OF BREAST: ICD-10-CM

## 2019-08-20 DIAGNOSIS — Z12.39 BREAST CANCER SCREENING, HIGH RISK PATIENT: ICD-10-CM

## 2019-08-20 DIAGNOSIS — Z85.3 ENCOUNTER FOR FOLLOW-UP SURVEILLANCE OF BREAST CANCER: Primary | ICD-10-CM

## 2019-08-20 DIAGNOSIS — M54.2 CERVICAL PAIN (NECK): Primary | ICD-10-CM

## 2019-08-20 PROBLEM — D24.1 BREAST FIBROADENOMA, RIGHT: Status: RESOLVED | Noted: 2019-02-27 | Resolved: 2019-08-20

## 2019-08-20 PROCEDURE — 97140 MANUAL THERAPY 1/> REGIONS: CPT | Performed by: PHYSICAL THERAPIST

## 2019-08-20 PROCEDURE — 99213 OFFICE O/P EST LOW 20 MIN: CPT | Performed by: SURGERY

## 2019-08-20 PROCEDURE — 97112 NEUROMUSCULAR REEDUCATION: CPT | Performed by: PHYSICAL THERAPIST

## 2019-08-20 PROCEDURE — 97110 THERAPEUTIC EXERCISES: CPT | Performed by: PHYSICAL THERAPIST

## 2019-08-20 NOTE — PROGRESS NOTES
Surgical Oncology Follow Up       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        Advance Care Planning/Advance Directives:  Discussed disease status, cancer treatment plans and/or cancer treatment goals with the patient       Oncology History:       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    2/20/2019 Biopsy     Left breast biopsy 12:00 [facility]     DCIS  ER/%      3/6/2019 Genetic Testing     [facility]ated [place] with [employer] Hereditary Cancer  Update Test-    APC, LIBERTY, AXIN2, BARD1, BMPR1A, BRCA1, BRCA2, BRIP1, CDH1, CDK4, CDKN2A, CHEK2, EPCAM (large rearrangment only), HOXB13 (sequencing only), GALNT12, MLH1, MSH2, MSH3 (excluding repetitive portions of exon 1), MSH6, MUTYH, NBN, NTHL1, PALB2, PMS2, PTEN, RAD51C, RAD51D, RNF43, RPS20, SMAD4, STK11, TP53, Sequencing was performed for select regions of POLE and POLD1, and large rearrangement analysis was performed for select regions of GREM1         Negative      5/29/2019 -  Cancer Staged     Staging form: Breast, AJCC 8th Edition  - Pathologic: Stage 0 (pTis (DCIS), pN0(sn), cM0, G2, ER+, FL+, HER2: Not Assessed) - Signed by Tu Jeffries MD on 5/29/2019  Neoadjuvant therapy: No  Laterality: Left  Method of lymph node assessment: [place] lymph node biopsy  Histologic grading system: 3 grade system           History of Present Illness:  Breast cancer follow-up  -Interval History:  None    Review of Systems:  Review of Systems   Constitutional: Negative  Negative for appetite change and fever  Eyes: Negative  Respiratory: Negative for shortness of breath  Cardiovascular: Negative  Gastrointestinal: Negative  Endocrine: Negative  Genitourinary: Negative  Musculoskeletal: Negative  Negative for arthralgias and myalgias  Skin: Negative  Allergic/Immunologic: Negative  Neurological: Negative  Hematological: Negative  Negative for adenopathy  Does not bruise/bleed easily  Psychiatric/Behavioral: Negative  Patient Active Problem List   Diagnosis    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    Dense breast tissue    Laryngopharyngeal reflux (LPR)    Dizziness    DNS (deviated nasal septum)    Low libido    Dyspareunia, female   Reesa Reichmann Secondary female infertility    Breast cancer screening, high risk patient    Encounter for follow-up surveillance of breast cancer     Past Medical History:   Diagnosis Date    Anemia     slight    Anesthesia     "after deviated septum surgery area just behind front teeth(incisive papilla) became very inflamed and sore/required prednisone"    Anxiety     Balance problem     "when experiencing dizziness"    Cancer ([address] )     left breast cancer    Dental crown present     Deviated septum     Difficulty swallowing     "at times"    Dizzy spells     "working with PCP"    Ear problem     "damage noted unknown cause, Right"    Eye pain     Fatigue     Generalized body aches     GERD (gastroesophageal reflux disease)     Nausea     "with dizziness"    Night sweat     Nipple discharge     left    Palpitations     Shortness of breath     "randomly"    Sinus infection     "sinus issues/not currently on antibiotic"    Weakness      Past Surgical History:   Procedure Laterality Date    INSERTION OF BREAST TISSUE EXPANDER Left 5/17/2019    Procedure: INSERTION/PLACEMENT TISSUE EXPANDER (EXCHANGE);   Surgeon: Rakan Justice MD; Location: [facility];  Service: Plastics    LEG SURGERY Left     cyst removed from leg    MASTECTOMY W/ SENTINEL NODE BIOPSY Left 2019    Procedure: MASTECTOMY WITH BIOPSY LYMPH NODE SENTINEL; NUC MED 0730;  Surgeon: Patricia Floyd MD;  Location: [facility];  Service: Surgical Oncology    AK IMPLNT BIO IMPLNT FOR SOFT TISSUE REINFORCEMENT Left 2019    Procedure: RECONSTRUCTION BREAST W/ IMPLANT;  Surgeon: Tom Pleitez MD;  [facility]: [facility];  Service: Plastics    SINUS SURGERY      US GUIDANCE BREAST BIOPSY RIGHT EACH ADDITIONAL Right 2019    US GUIDED BREAST BIOPSY LEFT COMPLETE Left 2019    WISDOM TOOTH EXTRACTION       Family History   Problem Relation Age of Onset    Melanoma Maternal Grandfather 79    Leukemia Maternal Grandmother 80    Glaucoma Other      Social History     Socioeconomic History    Marital status: /Civil Union     Spouse name: Not on file    Number of children: Not on file    Years of education: Not on file    Highest education level: Not on file   Occupational History    Not on file   Social Needs    Financial resource strain: Not on file    Food insecurity:     Worry: Not on file     Inability: Not on file    Transportation needs:     Medical: Not on file     Non-medical: Not on file   Tobacco Use    Smoking status: Former Smoker     Years: 7      Last attempt to quit:      Years since quittin 6    Smokeless tobacco: Never Used    Tobacco comment: was a casual former smoker    Substance and Sexual Activity    Alcohol use: Yes     Binge frequency: Weekly     Comment: 7-10 drinks per week     Drug use: No    Sexual activity: Not on file   Lifestyle    Physical activity:     Days per week: Not on file     Minutes per session: Not on file    Stress: Not on file   Relationships    Social connections:     Talks on phone: Not on file     Gets together: Not on file     Attends Latter-day service: Not on file     Active member of club or organization: Not on file     Attends meetings of clubs or organizations: Not on file     Relationship status: Not on file    Intimate partner violence:     Fear of current or ex partner: Not on file     Emotionally abused: Not on file     Physically abused: Not on file     Forced sexual activity: Not on file   Other Topics Concern    Not on file   Social History Narrative    Pt's menstrual flow is regular lasting 5 days  Cycle is 30-31 days    Pt has had one pregnancy    Last PAP 10/2018    Last mammo was 1/19       Current Outpatient Medications:     diazepam (VALIUM) 5 mg tablet, Take 1 tablet (5 mg total) by mouth every 6 (six) hours as needed for anxiety (Patient not taking: Reported on 8/20/2019), Disp: 15 tablet, Rfl: 0  No Known Allergies    The following portions of the patient's history were reviewed and updated as appropriate: allergies, current medications, past family history, past medical history, past social history, past surgical history and problem list         Vitals:    08/20/19 1419   BP: 100/70   Pulse: 72   Resp: 16   Temp: 99 3 °F (37 4 °C)       Physical Exam   Constitutional: She is oriented to person, place, and time  She appears well-developed and well-nourished  HENT:   Head: Normocephalic and atraumatic  Cardiovascular: Normal heart sounds  Pulmonary/Chest: Breath sounds normal  Right breast exhibits no inverted nipple, no mass, no nipple discharge, no skin change and no tenderness  Left breast exhibits skin change (Mastectomy scar with expander)  Left breast exhibits no mass and no tenderness  Abdominal: Soft  Lymphadenopathy:        Right axillary: No pectoral and no lateral adenopathy present  Left axillary: No pectoral and no lateral adenopathy present  Right: No supraclavicular adenopathy present  Left: No supraclavicular adenopathy present  Neurological: She is alert and oriented to person, place, and time     Psychiatric: She has a normal mood and affect  Discussion/Summary:  68-year-old female status post left mastectomy and expander reconstruction for extensive DCIS  She had a benign fibroadenoma on the right side and has dense breast tissue  There are no concerns on examination today  I will make arrangements for her right-sided mammogram due the end of November  She states that she will be having additional reconstruction in December  I will see her again in six months or sooner should the need arise

## 2019-08-26 ENCOUNTER — OFFICE VISIT (OUTPATIENT)
Dept: PHYSICAL THERAPY | Facility: REHABILITATION | Age: 44
End: 2019-08-26
Payer: COMMERCIAL

## 2019-08-26 DIAGNOSIS — M54.2 CERVICAL PAIN (NECK): Primary | ICD-10-CM

## 2019-08-26 PROCEDURE — 97110 THERAPEUTIC EXERCISES: CPT | Performed by: PHYSICAL THERAPIST

## 2019-08-26 PROCEDURE — 97140 MANUAL THERAPY 1/> REGIONS: CPT | Performed by: PHYSICAL THERAPIST

## 2019-08-26 PROCEDURE — 97112 NEUROMUSCULAR REEDUCATION: CPT | Performed by: PHYSICAL THERAPIST

## 2019-08-26 NOTE — PROGRESS NOTES
Daily Note     Today's date: 2019  Patient name: Maurilio Scott  : 1975  MRN: [id number]  Referring provider: Samuel Pinto, PT  Dx:   Encounter Diagnosis     ICD-10-CM    1  Cervical pain (neck) M54 2                   Subjective: Pt reports that she has not seen any differences in dizziness but she does feel like the exercises are helping her posturally  Objective: See treatment diary below      Assessment: Improved CTJ movement this visit  Pt showed good performance of TE this visit  Tolerated treatment well  Patient demonstrated fatigue post treatment and would benefit from continued PT      Plan: Progress treatment as tolerated  Assess core activation NV  Precautions: recent mastectomy L side      Manual            Suboccipital release TRISTIAN TRISTIAN TRISTIAN          Upper trap TPR TRISTIAN TRISTIAN TRISTIAN          1st rib mobilization Armond Porras          CTJ mobilization  Early Quarry                           Exercise Diary            DNF  10x5"            BL ER OTB 2x10            T's             Foam roll   4x1'          t-band rows  MTB 2x15 MTB 2x15          t-band extensions  BTB 2x10 BTB 15          Chin tucks with abduction  2x10 OTB 2x10 OTB          Wall slides  2x8 2x8          Self thoracic SNAG  HEP           Glute sets   HEP          Bridge             multifidus press             TA on marching with P-ball             SLS             storks             Quad?                                                                      Modalities

## 2019-09-03 ENCOUNTER — OFFICE VISIT (OUTPATIENT)
Dept: PHYSICAL THERAPY | Facility: REHABILITATION | Age: 44
End: 2019-09-03
Payer: COMMERCIAL

## 2019-09-03 DIAGNOSIS — M54.2 CERVICAL PAIN (NECK): Primary | ICD-10-CM

## 2019-09-03 PROCEDURE — 97112 NEUROMUSCULAR REEDUCATION: CPT | Performed by: PHYSICAL THERAPIST

## 2019-09-03 PROCEDURE — 97140 MANUAL THERAPY 1/> REGIONS: CPT | Performed by: PHYSICAL THERAPIST

## 2019-09-03 PROCEDURE — 97110 THERAPEUTIC EXERCISES: CPT | Performed by: PHYSICAL THERAPIST

## 2019-09-03 NOTE — PROGRESS NOTES
Daily Note     Today's date: 9/3/2019  Patient name: María Elena Alves  : 1975  MRN: [id number]  Referring provider: Richard Peace, PT  Dx:   Encounter Diagnosis     ICD-10-CM    1  Cervical pain (neck) M54 2                   Subjective: Pt reports that her neck has not hurt her the last several days however she continues to have dizziness  States the neck pain does not seem correlated with the dizziness  States glutes have been sore from glute sets  Objective: See treatment diary below      Assessment: Vestibular screen performed by GR PT today  Pt shows improved glute activation  Pt was able to progress postural exercise today  Some dizziness reported on the p-ball  Tolerated treatment well  Patient demonstrated fatigue post treatment and would benefit from continued PT      Plan: Progress treatment as tolerated  Continue to progress postural training on uneven surfaces in future visits  Precautions: recent mastectomy L side      Manual  8/12 8/20 8/26 9/3         Suboccipital release TRISTIAN TRISTIAN TRISTIAN screened         Upper trap TPR [facility]         [facility]         CTJ mobilization  Boni Dickinson                          Exercise Diary  8/12 8/20 8/26 9/3         DNF  10x5"            BL ER OTB 2x10            T's             Foam roll   4x1' 4x1'         t-band rows  MTB 2x15 MTB 2x15 HEP         t-band extensions  BTB 2x10 BTB 15 HEP         Chin tucks with abduction  2x10 OTB 2x10 OTB HEP         Wall slides  2x8 2x8 HEP         Self thoracic SNAG  HEP  HEP         Glute sets   HEP HEP         Bridge    2x10         multifidus press    20 each GTB         TA on marching with P-ball    20 each         SLS             storks             Quad?              TA with arm raise    20 each on p-ball                                                    Modalities

## 2019-09-09 ENCOUNTER — OFFICE VISIT (OUTPATIENT)
Dept: PHYSICAL THERAPY | Facility: REHABILITATION | Age: 44
End: 2019-09-09
Payer: COMMERCIAL

## 2019-09-09 DIAGNOSIS — M54.2 CERVICAL PAIN (NECK): Primary | ICD-10-CM

## 2019-09-09 PROCEDURE — 97112 NEUROMUSCULAR REEDUCATION: CPT | Performed by: PHYSICAL THERAPIST

## 2019-09-09 PROCEDURE — 97140 MANUAL THERAPY 1/> REGIONS: CPT | Performed by: PHYSICAL THERAPIST

## 2019-09-09 PROCEDURE — 97110 THERAPEUTIC EXERCISES: CPT | Performed by: PHYSICAL THERAPIST

## 2019-09-09 NOTE — PROGRESS NOTES
Daily Note     Today's date: 2019  Patient name: María Elena Alves  : 1975  MRN: [id number]  Referring provider: Richard Peace, PT  Dx:   Encounter Diagnosis     ICD-10-CM    1  Cervical pain (neck) M54 2                   Subjective: Pt reports that she has not had dizziness over the last few days however her neck has remained sore  Pt has been performing HEP and feels core control is improving  Objective: See treatment diary below      Assessment: No dizziness reported throughout treatment  CTJ remains hypomobile  Pt intends to seek chiropractic care for this  Discussed the possibility of stress contributing to dizziness as she had a big test last week and does not have as much school stress this week  Tolerated treatment well  Patient demonstrated fatigue post treatment and would benefit from continued PT      Plan: Progress treatment as tolerated  Continue to progress postural training on uneven surfaces in future visits       Precautions: recent mastectomy L side      Manual  8/12 8/20 8/26 9/3 9/9        Suboccipital release LUCIEN LUCIEN LUCIEN screened Lucien        Upper trap TPR Evangelina Raring        1st rib mobilization Will Raring        CTJ mobilization  Abdoulaye Solis                         Exercise Diary  8/12 8/20 8/26 9/3 9/9        DNF  10x5"            BL ER OTB 2x10            T's             Foam roll   4x1' 4x1' 4x1'        t-band rows  MTB 2x15 MTB 2x15 HEP HEP        t-band extensions  BTB 2x10 BTB 15 HEP HEP        Chin tucks with abduction  2x10 OTB 2x10 OTB HEP HEP        Wall slides  2x8 2x8 HEP HEP        Self thoracic SNAG  HEP  HEP HEP        Glute sets   HEP HEP HEP        Bridge    2x10 HEP        multifidus press    20 each GTB HEP        TA on marching with P-ball    20 each 20 each        SLS     demo        storks     10 each OTB        Quad?     15 each        TA with arm raise    20 each on p-ball         VORx1 with head turns      Black t 20 each Modalities

## 2019-09-16 ENCOUNTER — OFFICE VISIT (OUTPATIENT)
Dept: PHYSICAL THERAPY | Facility: REHABILITATION | Age: 44
End: 2019-09-16
Payer: COMMERCIAL

## 2019-09-16 DIAGNOSIS — M54.2 CERVICAL PAIN (NECK): Primary | ICD-10-CM

## 2019-09-16 PROCEDURE — 97140 MANUAL THERAPY 1/> REGIONS: CPT | Performed by: PHYSICAL THERAPIST

## 2019-09-16 PROCEDURE — 97110 THERAPEUTIC EXERCISES: CPT | Performed by: PHYSICAL THERAPIST

## 2019-09-16 PROCEDURE — 97112 NEUROMUSCULAR REEDUCATION: CPT | Performed by: PHYSICAL THERAPIST

## 2019-09-16 NOTE — PROGRESS NOTES
Daily Note     Today's date: 2019  Patient name: Chapis Paul  : 1975  MRN: [id number]  Referring provider: Adrienne Mccain, PT  Dx:   Encounter Diagnosis     ICD-10-CM    1  Cervical pain (neck) M54 2                   Subjective: Pt continues to report CTJ tightness  States she has had one episode of dizziness yesterday but has had less frequent episodes  States exercises are going well at home  Objective: See treatment diary below      Assessment:    Tolerated treatment well  Patient demonstrated fatigue post treatment and would benefit from continued PT  Pt was able to complete TE without dizziness  Increased rib hypomobility today  Pt was able to progress TA strengthening with fatigue but no complaints of pain  Plan: Progress treatment as tolerated  Continue to progress postural training on uneven surfaces in future visits       Precautions: recent mastectomy L side      Manual  8/12 8/20 8/26 9/3 9/9 9/16       Suboccipital release LUCIEN LUCIEN LUCIEN screened Lucien screened       Upper trap TPR Lelon Freeze       1st rib mobilization Lelon Freeze       CTJ mobilization  Earlean Hurry                        Exercise Diary  8/12 8/20 8/26 9/3 9/9 9/15       DNF  10x5"            BL ER OTB 2x10            T's             Foam roll   4x1' 4x1' 4x1' 4x1'       t-band rows  MTB 2x15 MTB 2x15 HEP HEP HEP       t-band extensions  BTB 2x10 BTB 15 HEP HEP HEP       Chin tucks with abduction  2x10 OTB 2x10 OTB HEP HEP HEP       Wall slides  2x8 2x8 HEP HEP HEP       Self thoracic SNAG  HEP  HEP HEP HEP       Glute sets   HEP HEP HEP HEP       Bridge    2x10 HEP HEP       multifidus press    20 each GTB HEP HEP       TA on marching with P-ball    20 each 20 each HEP       SLS     demo        storks     10 each OTB        Quad?     15 each 15 each       TA with arm raise    20 each on p-ball         VORx1 with head turns      Black t 20 each 20 each       Resisted side stepping      BTB 5'       TA with SLR      20 each       T-band rotation diagonals       BTB 15 each           Modalities

## 2019-09-23 ENCOUNTER — APPOINTMENT (OUTPATIENT)
Dept: PHYSICAL THERAPY | Facility: REHABILITATION | Age: 44
End: 2019-09-23
Payer: COMMERCIAL

## 2019-09-23 NOTE — PROGRESS NOTES
Daily Note     Today's date: 2019  Patient name: River Cheng  : 1975  MRN: [id number]  Referring provider: Kelsy Law, PT  Dx:   No diagnosis found  Subjective: Pt continues to report CTJ tightness  States she has had one episode of dizziness yesterday but has had less frequent episodes  States exercises are going well at home  Objective: See treatment diary below      Assessment:    Tolerated treatment well  Patient demonstrated fatigue post treatment and would benefit from continued PT  Pt was able to complete TE without dizziness  Increased rib hypomobility today  Pt was able to progress TA strengthening with fatigue but no complaints of pain  Plan: Progress treatment as tolerated  Continue to progress postural training on uneven surfaces in future visits       Precautions: recent mastectomy L side      Manual  8/12 8/20 8/26 9/3 9/9 9/16       Suboccipital release LUCIEN LUCIEN LUCIEN screened Lucien screened       Upper trap TPR Corrinne Vanessa       1st rib mobilization Corrinne Vanessa       CTJ mobilization  Lubna Hennessy                        Exercise Diary  8/12 8/20 8/26 9/3 9/9 9/15       DNF  10x5"            BL ER OTB 2x10            T's             Foam roll   4x1' 4x1' 4x1' 4x1'       t-band rows  MTB 2x15 MTB 2x15 HEP HEP HEP       t-band extensions  BTB 2x10 BTB 15 HEP HEP HEP       Chin tucks with abduction  2x10 OTB 2x10 OTB HEP HEP HEP       Wall slides  2x8 2x8 HEP HEP HEP       Self thoracic SNAG  HEP  HEP HEP HEP       Glute sets   HEP HEP HEP HEP       Bridge    2x10 HEP HEP       multifidus press    20 each GTB HEP HEP       TA on marching with P-ball    20 each 20 each HEP       SLS     demo        storks     10 each OTB        Quad?     15 each 15 each       TA with arm raise    20 each on p-ball         VORx1 with head turns      Black t 20 each 20 each       Resisted side stepping      BTB 5'       TA with SLR      20 each       T-band rotation diagonals       BTB 15 each           Modalities

## 2019-09-30 ENCOUNTER — OFFICE VISIT (OUTPATIENT)
Dept: PHYSICAL THERAPY | Facility: REHABILITATION | Age: 44
End: 2019-09-30
Payer: COMMERCIAL

## 2019-09-30 DIAGNOSIS — M54.2 CERVICAL PAIN (NECK): Primary | ICD-10-CM

## 2019-09-30 PROCEDURE — 97110 THERAPEUTIC EXERCISES: CPT | Performed by: PHYSICAL THERAPIST

## 2019-09-30 PROCEDURE — 97140 MANUAL THERAPY 1/> REGIONS: CPT | Performed by: PHYSICAL THERAPIST

## 2019-09-30 PROCEDURE — 97112 NEUROMUSCULAR REEDUCATION: CPT | Performed by: PHYSICAL THERAPIST

## 2019-09-30 NOTE — PROGRESS NOTES
Daily Note     Today's date: 2019  Patient name: Rob Jay  : 1975  MRN: [id number]  Referring provider: Juanita Corona, PT  Dx:   Encounter Diagnosis     ICD-10-CM    1  Cervical pain (neck) M54 2                   Subjective: Pt reports that she has had decreased dizziness and since beginning some dietary modifications  States she has been performing HEP and feels challenged by current program  Feels that she has the tools she needs to continue independently  Objective: See treatment diary below      Assessment:    Tolerated treatment well  Patient demonstrated fatigue post treatment and would benefit from continued PT  Pt continues to be challenged by current TE program  She demonstrated exercises with good form and minimal cuing  Plan: Pt will follow up as needed for the next 30 days       Precautions: recent mastectomy L side      Manual  8/12 8/20 8/26 9/3 9/9 9/16 9/30      Suboccipital release LUCIEN LUCIEN LUCIEN screened Lucien screened LUCIEN      Upper trap TPR Darshan Courser      1st rib mobilization Prakash Rush LUCIEN      CTJ mobilization  Prakash Pine                       Exercise Diary  8/12 8/20 8/26 9/3 9/9 9/15 9/30      DNF  10x5"            BL ER OTB 2x10            T's             Foam roll   4x1' 4x1' 4x1' 4x1' 4x1'      t-band rows  MTB 2x15 MTB 2x15 HEP HEP HEP HEP      t-band extensions  BTB 2x10 BTB 15 HEP HEP HEP HEP      Chin tucks with abduction  2x10 OTB 2x10 OTB HEP HEP HEP HEP      Wall slides  2x8 2x8 HEP HEP HEP HEP      Self thoracic SNAG  HEP  HEP HEP HEP HEP      Glute sets   HEP HEP HEP HEP HEP      Bridge    2x10 HEP HEP HEP      multifidus press    20 each GTB HEP HEP HEP      TA on marching with P-ball    20 each 20 each HEP HEP      SLS     demo        storks     10 each OTB        Quad?     15 each 15 each 15 each      TA with arm raise    20 each on p-ball         VORx1 with head turns      Black t 20 each 20 each 20 each side to side and up and down Resisted side stepping      BTB 5' BTB 30 each      TA with SLR      20 each 20 each      T-band rotation diagonals       BTB 15 each GTB 15 each          Modalities

## 2019-12-03 ENCOUNTER — TRANSCRIBE ORDERS (OUTPATIENT)
Dept: LAB | Facility: HOSPITAL | Age: 44
End: 2019-12-03

## 2019-12-03 ENCOUNTER — APPOINTMENT (OUTPATIENT)
Dept: LAB | Facility: HOSPITAL | Age: 44
End: 2019-12-03
Attending: SURGERY
Payer: COMMERCIAL

## 2019-12-03 DIAGNOSIS — Z01.812 PRE-OPERATIVE LABORATORY EXAMINATION: ICD-10-CM

## 2019-12-03 DIAGNOSIS — Z01.812 PRE-OPERATIVE LABORATORY EXAMINATION: Primary | ICD-10-CM

## 2019-12-03 DIAGNOSIS — N65.0 DEFORMITY OF RECONSTRUCTED BREAST: ICD-10-CM

## 2019-12-03 LAB
ANION GAP SERPL CALCULATED.3IONS-SCNC: 5 MMOL/L (ref 4–13)
BUN SERPL-MCNC: 17 MG/DL (ref 5–25)
CALCIUM SERPL-MCNC: 9.3 MG/DL (ref 8.3–10.1)
CHLORIDE SERPL-SCNC: 106 MMOL/L (ref 100–108)
CO2 SERPL-SCNC: 28 MMOL/L (ref 21–32)
CREAT SERPL-MCNC: 0.78 MG/DL (ref 0.6–1.3)
ERYTHROCYTE [DISTWIDTH] IN BLOOD BY AUTOMATED COUNT: 12.5 % (ref 11.6–15.1)
GFR SERPL CREATININE-BSD FRML MDRD: 94 ML/MIN/1.73SQ M
GLUCOSE SERPL-MCNC: 83 MG/DL (ref 65–140)
HCT VFR BLD AUTO: 37.9 % (ref 34.8–46.1)
HGB BLD-MCNC: 12 G/DL (ref 11.5–15.4)
MCH RBC QN AUTO: 27.4 PG (ref 26.8–34.3)
MCHC RBC AUTO-ENTMCNC: 31.7 G/DL (ref 31.4–37.4)
MCV RBC AUTO: 87 FL (ref 82–98)
PLATELET # BLD AUTO: 257 THOUSANDS/UL (ref 149–390)
PMV BLD AUTO: 11.7 FL (ref 8.9–12.7)
POTASSIUM SERPL-SCNC: 3.6 MMOL/L (ref 3.5–5.3)
RBC # BLD AUTO: 4.38 MILLION/UL (ref 3.81–5.12)
SODIUM SERPL-SCNC: 139 MMOL/L (ref 136–145)
WBC # BLD AUTO: 6.22 THOUSAND/UL (ref 4.31–10.16)

## 2019-12-03 PROCEDURE — 36415 COLL VENOUS BLD VENIPUNCTURE: CPT

## 2019-12-03 PROCEDURE — 80048 BASIC METABOLIC PNL TOTAL CA: CPT

## 2019-12-03 PROCEDURE — 85027 COMPLETE CBC AUTOMATED: CPT

## 2019-12-09 ENCOUNTER — HOSPITAL ENCOUNTER (OUTPATIENT)
Dept: MAMMOGRAPHY | Facility: CLINIC | Age: 44
Discharge: HOME | End: 2019-12-09
Payer: COMMERCIAL

## 2019-12-09 DIAGNOSIS — Z12.39 BREAST CANCER SCREENING, HIGH RISK PATIENT: ICD-10-CM

## 2019-12-09 PROCEDURE — 77067 SCR MAMMO BI INCL CAD: CPT

## 2019-12-09 PROCEDURE — 77063 BREAST TOMOSYNTHESIS BI: CPT

## 2019-12-16 RX ORDER — MULTIVITAMIN
1 CAPSULE ORAL DAILY
COMMUNITY
End: 2021-06-29

## 2019-12-16 RX ORDER — IBUPROFEN 200 MG
200-800 TABLET ORAL EVERY 6 HOURS PRN
COMMUNITY

## 2019-12-16 NOTE — PRE-PROCEDURE INSTRUCTIONS
Pre-Surgery Instructions:   Medication Instructions    diazepam (VALIUM) 5 mg tablet Instructed patient per Anesthesia Guidelines   ibuprofen (MOTRIN) 200 mg tablet Patient was instructed by Physician and understands   Multiple Vitamin (MULTIVITAMIN) capsule Instructed patient per Anesthesia Guidelines  Instructed to take valium if needed with sip ofw ater am of surgery per anesthesia

## 2019-12-17 ENCOUNTER — ANESTHESIA EVENT (OUTPATIENT)
Dept: PERIOP | Facility: HOSPITAL | Age: 44
End: 2019-12-17
Payer: COMMERCIAL

## 2019-12-18 ENCOUNTER — HOSPITAL ENCOUNTER (OUTPATIENT)
Facility: HOSPITAL | Age: 44
Setting detail: OUTPATIENT SURGERY
Discharge: HOME | End: 2019-12-18
Attending: SURGERY | Admitting: SURGERY
Payer: COMMERCIAL

## 2019-12-18 ENCOUNTER — ANESTHESIA (OUTPATIENT)
Dept: PERIOP | Facility: HOSPITAL | Age: 44
End: 2019-12-18
Payer: COMMERCIAL

## 2019-12-18 VITALS
BODY MASS INDEX: 22.53 KG/M2 | DIASTOLIC BLOOD PRESSURE: 64 MMHG | WEIGHT: 132 LBS | TEMPERATURE: 97.4 F | HEART RATE: 85 BPM | RESPIRATION RATE: 18 BRPM | SYSTOLIC BLOOD PRESSURE: 102 MMHG | HEIGHT: 64 IN | OXYGEN SATURATION: 100 %

## 2019-12-18 DIAGNOSIS — Z85.3 PERSONAL HISTORY OF MALIGNANT NEOPLASM OF BREAST: ICD-10-CM

## 2019-12-18 DIAGNOSIS — N65.0 DEFORMITY OF RECONSTRUCTED BREAST: ICD-10-CM

## 2019-12-18 PROBLEM — T85.44XA CAPSULAR CONTRACTURE OF BREAST IMPLANT: Status: ACTIVE | Noted: 2019-12-18

## 2019-12-18 LAB
EXT PREGNANCY TEST URINE: NEGATIVE
EXT. CONTROL: NORMAL

## 2019-12-18 PROCEDURE — C1789 PROSTHESIS, BREAST, IMP: HCPCS | Performed by: SURGERY

## 2019-12-18 PROCEDURE — 81025 URINE PREGNANCY TEST: CPT | Performed by: ANESTHESIOLOGY

## 2019-12-18 PROCEDURE — 88302 TISSUE EXAM BY PATHOLOGIST: CPT | Performed by: PATHOLOGY

## 2019-12-18 DEVICE — NATRELLE INSPIRA SCL 320CC LOW PROFILE  SMOOTH ROUND SILICONE
Type: IMPLANTABLE DEVICE | Site: BREAST | Status: FUNCTIONAL
Brand: NATRELLE INSPIRA COHESIVE BREAST IMPLANTS

## 2019-12-18 RX ORDER — HYDROMORPHONE HCL 110MG/55ML
PATIENT CONTROLLED ANALGESIA SYRINGE INTRAVENOUS AS NEEDED
Status: DISCONTINUED | OUTPATIENT
Start: 2019-12-18 | End: 2019-12-18 | Stop reason: SURG

## 2019-12-18 RX ORDER — HYDROCODONE BITARTRATE AND ACETAMINOPHEN 5; 325 MG/1; MG/1
2 TABLET ORAL EVERY 4 HOURS PRN
Status: DISCONTINUED | OUTPATIENT
Start: 2019-12-18 | End: 2019-12-18 | Stop reason: HOSPADM

## 2019-12-18 RX ORDER — CEFAZOLIN SODIUM 1 G/50ML
1000 SOLUTION INTRAVENOUS ONCE
Status: COMPLETED | OUTPATIENT
Start: 2019-12-18 | End: 2019-12-18

## 2019-12-18 RX ORDER — MIDAZOLAM HYDROCHLORIDE 2 MG/2ML
INJECTION, SOLUTION INTRAMUSCULAR; INTRAVENOUS AS NEEDED
Status: DISCONTINUED | OUTPATIENT
Start: 2019-12-18 | End: 2019-12-18 | Stop reason: SURG

## 2019-12-18 RX ORDER — FENTANYL CITRATE 50 UG/ML
INJECTION, SOLUTION INTRAMUSCULAR; INTRAVENOUS AS NEEDED
Status: DISCONTINUED | OUTPATIENT
Start: 2019-12-18 | End: 2019-12-18 | Stop reason: SURG

## 2019-12-18 RX ORDER — SODIUM CHLORIDE 9 MG/ML
125 INJECTION, SOLUTION INTRAVENOUS CONTINUOUS
Status: DISCONTINUED | OUTPATIENT
Start: 2019-12-18 | End: 2019-12-18 | Stop reason: HOSPADM

## 2019-12-18 RX ORDER — MAGNESIUM HYDROXIDE 1200 MG/15ML
LIQUID ORAL AS NEEDED
Status: DISCONTINUED | OUTPATIENT
Start: 2019-12-18 | End: 2019-12-18 | Stop reason: HOSPADM

## 2019-12-18 RX ORDER — ONDANSETRON 2 MG/ML
4 INJECTION INTRAMUSCULAR; INTRAVENOUS ONCE AS NEEDED
Status: DISCONTINUED | OUTPATIENT
Start: 2019-12-18 | End: 2019-12-18 | Stop reason: HOSPADM

## 2019-12-18 RX ORDER — SCOLOPAMINE TRANSDERMAL SYSTEM 1 MG/1
1 PATCH, EXTENDED RELEASE TRANSDERMAL ONCE AS NEEDED
Status: DISCONTINUED | OUTPATIENT
Start: 2019-12-18 | End: 2019-12-18 | Stop reason: HOSPADM

## 2019-12-18 RX ORDER — DEXAMETHASONE SODIUM PHOSPHATE 10 MG/ML
INJECTION, SOLUTION INTRAMUSCULAR; INTRAVENOUS AS NEEDED
Status: DISCONTINUED | OUTPATIENT
Start: 2019-12-18 | End: 2019-12-18 | Stop reason: SURG

## 2019-12-18 RX ORDER — ONDANSETRON 2 MG/ML
INJECTION INTRAMUSCULAR; INTRAVENOUS AS NEEDED
Status: DISCONTINUED | OUTPATIENT
Start: 2019-12-18 | End: 2019-12-18 | Stop reason: SURG

## 2019-12-18 RX ORDER — GLYCOPYRROLATE 0.2 MG/ML
INJECTION INTRAMUSCULAR; INTRAVENOUS AS NEEDED
Status: DISCONTINUED | OUTPATIENT
Start: 2019-12-18 | End: 2019-12-18 | Stop reason: SURG

## 2019-12-18 RX ORDER — NEOSTIGMINE METHYLSULFATE 1 MG/ML
INJECTION INTRAVENOUS AS NEEDED
Status: DISCONTINUED | OUTPATIENT
Start: 2019-12-18 | End: 2019-12-18 | Stop reason: SURG

## 2019-12-18 RX ORDER — PROPOFOL 10 MG/ML
INJECTION, EMULSION INTRAVENOUS AS NEEDED
Status: DISCONTINUED | OUTPATIENT
Start: 2019-12-18 | End: 2019-12-18 | Stop reason: SURG

## 2019-12-18 RX ORDER — ROCURONIUM BROMIDE 10 MG/ML
INJECTION, SOLUTION INTRAVENOUS AS NEEDED
Status: DISCONTINUED | OUTPATIENT
Start: 2019-12-18 | End: 2019-12-18 | Stop reason: SURG

## 2019-12-18 RX ORDER — HYDROCODONE BITARTRATE AND ACETAMINOPHEN 5; 325 MG/1; MG/1
1 TABLET ORAL EVERY 4 HOURS PRN
Status: DISCONTINUED | OUTPATIENT
Start: 2019-12-18 | End: 2019-12-18 | Stop reason: HOSPADM

## 2019-12-18 RX ORDER — HYDROMORPHONE HCL/PF 1 MG/ML
0.5 SYRINGE (ML) INJECTION
Status: DISCONTINUED | OUTPATIENT
Start: 2019-12-18 | End: 2019-12-18 | Stop reason: HOSPADM

## 2019-12-18 RX ADMIN — ONDANSETRON 4 MG: 2 INJECTION INTRAMUSCULAR; INTRAVENOUS at 15:40

## 2019-12-18 RX ADMIN — HYDROMORPHONE HYDROCHLORIDE 0.5 MG: 2 INJECTION, SOLUTION INTRAMUSCULAR; INTRAVENOUS; SUBCUTANEOUS at 15:39

## 2019-12-18 RX ADMIN — DEXAMETHASONE SODIUM PHOSPHATE 4 MG: 10 INJECTION, SOLUTION INTRAMUSCULAR; INTRAVENOUS at 14:38

## 2019-12-18 RX ADMIN — SODIUM CHLORIDE 125 ML/HR: 0.9 INJECTION, SOLUTION INTRAVENOUS at 13:25

## 2019-12-18 RX ADMIN — HYDROMORPHONE HYDROCHLORIDE 0.5 MG: 1 INJECTION, SOLUTION INTRAMUSCULAR; INTRAVENOUS; SUBCUTANEOUS at 16:50

## 2019-12-18 RX ADMIN — GLYCOPYRROLATE 0.4 MG: 0.2 INJECTION INTRAMUSCULAR; INTRAVENOUS at 16:01

## 2019-12-18 RX ADMIN — HYDROMORPHONE HYDROCHLORIDE 0.5 MG: 1 INJECTION, SOLUTION INTRAMUSCULAR; INTRAVENOUS; SUBCUTANEOUS at 16:40

## 2019-12-18 RX ADMIN — MIDAZOLAM 2 MG: 1 INJECTION INTRAMUSCULAR; INTRAVENOUS at 14:26

## 2019-12-18 RX ADMIN — SCOPALAMINE 1 PATCH: 1 PATCH, EXTENDED RELEASE TRANSDERMAL at 13:29

## 2019-12-18 RX ADMIN — FENTANYL CITRATE 50 MCG: 50 INJECTION, SOLUTION INTRAMUSCULAR; INTRAVENOUS at 15:27

## 2019-12-18 RX ADMIN — SODIUM CHLORIDE: 0.9 INJECTION, SOLUTION INTRAVENOUS at 16:09

## 2019-12-18 RX ADMIN — NEOSTIGMINE METHYLSULFATE 3 MG: 1 INJECTION, SOLUTION INTRAVENOUS at 16:01

## 2019-12-18 RX ADMIN — FENTANYL CITRATE 50 MCG: 50 INJECTION, SOLUTION INTRAMUSCULAR; INTRAVENOUS at 14:51

## 2019-12-18 RX ADMIN — CEFAZOLIN SODIUM 1000 MG: 1 SOLUTION INTRAVENOUS at 14:26

## 2019-12-18 RX ADMIN — PROPOFOL 200 MG: 10 INJECTION, EMULSION INTRAVENOUS at 14:38

## 2019-12-18 RX ADMIN — ROCURONIUM BROMIDE 50 MG: 50 INJECTION, SOLUTION INTRAVENOUS at 14:38

## 2019-12-18 RX ADMIN — SODIUM CHLORIDE: 0.9 INJECTION, SOLUTION INTRAVENOUS at 15:18

## 2019-12-18 RX ADMIN — HYDROMORPHONE HYDROCHLORIDE 0.5 MG: 1 INJECTION, SOLUTION INTRAMUSCULAR; INTRAVENOUS; SUBCUTANEOUS at 16:30

## 2019-12-18 RX ADMIN — FENTANYL CITRATE 100 MCG: 50 INJECTION, SOLUTION INTRAMUSCULAR; INTRAVENOUS at 14:38

## 2019-12-18 NOTE — DISCHARGE SUMMARY
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]  Personal history of malignant neoplasm of breast [Z85 3]  Principal Procedure:   CAPSULECTOMY (Left Breast)  RECONSTRUCTION BREAST W/ IMPLANT (Left Breast)  AUGMENTATION BREAST (Right Breast)  Discharge Medications:  See after visit summary for reconciled discharge medications provided to patient and family  Reason for Admission:  Sravan Jacob was electively admitted to undergo the above named procedure on 12/18/2019 as an outpatient  Hospital Course:  Patient underwent the above named procedure on the day of admission without complications  They were discharged home the same day  Disposition:  To home in care of self and family    Condition:  Good  Follow up:  Patient with follow up in the office with Dr Reji Alexandra MD in 1 week(s) or as scheduled per his office  Reji Alexandra MD  12/18/2019 2:35 PM

## 2019-12-18 NOTE — ANESTHESIA POSTPROCEDURE EVALUATION
Post-Op Assessment Note    CV Status:  Stable  Pain Score: 2    Pain management: adequate     Mental Status:  Alert and awake   Hydration Status:  Euvolemic   PONV Controlled:  Controlled   Airway Patency:  Patent   Post Op Vitals Reviewed: Yes      Staff: Anesthesiologist           BP      Temp     Pulse     Resp      SpO2

## 2019-12-18 NOTE — DISCHARGE INSTRUCTIONS
[facility]  Postoperative Instructions for Outpatient Surgery  [address] [facility], [address] [facility], [address], [place], [address] [facility] / Mayte Bill[place]  / www [email or web address]      These  instructions are being provided by you doctor to give you basic guidelines during you post-op recovery  Please let our office know your contact information has changed  Please call the office today to schedule a post operative appointment, and tell the office staff  that you doctor needs see you in our office in 7-10 days  Dressing:          No dressing required         Bathing      Showering permitted           Medication    Resume preparative medication  Skin glue was applied to area  Motrin or Tylenol is OK    Other Instruction: wash all areas daily      Activities  No bending , lifting, or straining    You may drive when you are off you pain medications  Walking permitted  Bruising, and welling I expected  incision and surrounding area  It is normal to have a slight fever after surgery  If the fever I above 101 5 please call our office  If you have a drain, leaking around the drain it may occur  The normal  Occasionally, a drain may clog  If this happens carefully remove the bulb and try milking the obstruction  out of the tubing  Garments after liposuction will become soiled  You should protect area where you will sitting or lying  The majority of the drainage should subside within 48 hrs  Do Not remove garment unless otherwise instructed  A side effect of the pain medication is constipation  If this dose happen your doctor recommends that you take Senokot, Colace or something over the counter for this  Do not hesitate to call the office at [phone] if you have any questions about your surgery  The nursing staff will be glad to assist you in any possible way   If it is necessary for you to contract a doctor when the office is closed or on the weekend, please call [phone] and it will direct you to the answering service  A physician will contact you to assist you with any problems or questions

## 2019-12-18 NOTE — OP NOTE
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]     * Personal history of malignant neoplasm of breast [Z85 3]    Procedure(s) (LRB):  CAPSULECTOMY (Left)  RECONSTRUCTION BREAST W/ IMPLANT (Left)  AUGMENTATION BREAST (Right)    Specimen(s):  ID Type Source Tests Collected by Time Destination   1 : left breast mastectomy scar and capsule Tissue Breast, Left TISSUE EXAM Tim Posada MD 2019 1514        Estimated Blood Loss:   Minimal    Drains:  Closed/Suction Drain Left;Lateral Breast 15 Fr  (Active)   Number of days: 215       Anesthesia Type:   General    Operative Indications:  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]  Personal history of malignant neoplasm of breast [Z85 3]  Breast asymmetry    Operative Findings:  none    Complications:   None    Procedure and Technique:  Patient identified correctly on the table  Intubated by anesthesia  Prepped and draped in sterile surgical fashion  We 1st started with the left breast   The old mastectomy scar was excised with a 15 blade and electrocautery  The mastectomy scar was sent off to pathology as specimen  We then performed a anterior posterior capsulectomy on the left breast   We then we elevated the pocket both medial superiorly and lateral superiorly to allow for the new implant to sit appropriately    At this point once the pocket was created we obtained meticulous hemostasis irrigated out the pocket with saline and Betadine solution placed a 650 full height cohesive silicone implant Sizer on the left hand side while the Sizer was sitting in the left breast we then went on to the right breast made a 4 cm incision in the inframammary fold  We then elevated the pectoralis major muscle up off the chest wall along the inferior medial margins up to the 3 o'clock position on the right breast September 6 stasis placed a 3 20 cc low profile cohesive implant on the right  After placement we had good symmetry as compared to the left reconstructed breast   We irrigated out the pocket with saline and Betadine solution close our incision line with deep 2-0 Vicryl 3-0 PDS running subcuticular 4-0 Monocryl stitch and Dermabond dressing  Due to the asymmetry we then went ahead and removed the Sizer on the left breast place the permanent implant on the left 650 cc full height call his of Allergan silicone implant  At this point we irrigated out the pocket with saline Betadine solution a 10 meticulous hemostasis and then closed this in multiple layers to the fascia layer was closed with 2-0 PDS deep dermal 3-0 PDS and 2-0 Vicryl suture and running subcuticular 3-0 Monocryl stitch patient had Dermabond placed into a surgical bra  Awakened from surgery taken recovery in stable condition  All counts correct x2       I was present for the entire procedure    Patient Disposition:  PACU     SIGNATURE: Macarena Serra MD  DATE: December 18, 2019  TIME: 4:09 PM

## 2019-12-18 NOTE — INTERVAL H&P NOTE
H&P reviewed  After examining the patient I find no changes in the patients condition since the H&P had been written  Ht 5' 4" (1 626 m)   Wt 59 9 kg (132 lb)   LMP 12/10/2019   Breastfeeding?  No   BMI 22 66 kg/m²

## 2019-12-18 NOTE — ANESTHESIA PREPROCEDURE EVALUATION
Review of Systems/Medical History  Patient summary reviewed        Cardiovascular  Negative cardio ROS    Pulmonary  Negative pulmonary ROS        GI/Hepatic  Negative GI/hepatic ROS   GERD well controlled,        Negative  ROS        Endo/Other  Negative endo/other ROS      GYN  Negative gynecology ROS          Hematology  Negative hematology ROS Anemia ,     Musculoskeletal  Negative musculoskeletal ROS        Neurology  Negative neurology ROS      Psychology   Negative psychology ROS Anxiety,              Physical Exam    Airway    Mallampati score: II  TM Distance: >3 FB  Neck ROM: full     Dental   No notable dental hx     Cardiovascular  Comment: Negative ROS, Rhythm: regular, Rate: normal, Cardiovascular exam normal    Pulmonary  Pulmonary exam normal Breath sounds clear to auscultation,     Other Findings        Anesthesia Plan  ASA Score- 2     Anesthesia Type- general with ASA Monitors  Additional Monitors:   Airway Plan: ETT  Plan Factors-    Induction- intravenous  Postoperative Plan-     Informed Consent- Anesthetic plan and risks discussed with patient              Review of Systems/Medical History  Patient summary reviewed  Chart reviewed  History of anesthetic complications PONV    Cardiovascular  Negative cardio ROS    Pulmonary  Shortness of breath,        GI/Hepatic    GERD well controlled,   Comment: LPR Hx     Negative  ROS        Endo/Other  Negative endo/other ROS      GYN    Breast cancer left mastectomy       Hematology  Anemia ,     Musculoskeletal  Negative musculoskeletal ROS        Neurology  Negative neurology ROS      Psychology   Anxiety,              Physical Exam    Airway    Mallampati score: II  TM Distance: >3 FB  Neck ROM: full     Dental   No notable dental hx     Cardiovascular  Comment: Negative ROS, Rhythm: regular, Rate: normal, Cardiovascular exam normal    Pulmonary  Pulmonary exam normal Breath sounds clear to auscultation,     Other Findings        Anesthesia Plan  ASA Score- 2     Anesthesia Type- general with ASA Monitors  Additional Monitors:   Airway Plan: ETT  Comment: SCOP patch ordered  Pt says she had "shivering" post-mastectomy, to point that she "chipped a tooth"  Was tx with meds in PACU  Plan Factors-Patient not instructed to abstain from smoking on day of procedure  Patient did not smoke on day of surgery  Induction- intravenous  Postoperative Plan-     Informed Consent- Anesthetic plan and risks discussed with patient and spouse Phyllis Ross

## 2020-06-04 ENCOUNTER — TELEPHONE (OUTPATIENT)
Dept: SURGICAL ONCOLOGY | Facility: CLINIC | Age: 45
End: 2020-06-04

## 2020-06-08 ENCOUNTER — OFFICE VISIT (OUTPATIENT)
Dept: SURGICAL ONCOLOGY | Facility: CLINIC | Age: 45
End: 2020-06-08
Payer: COMMERCIAL

## 2020-06-08 VITALS
DIASTOLIC BLOOD PRESSURE: 68 MMHG | SYSTOLIC BLOOD PRESSURE: 120 MMHG | RESPIRATION RATE: 16 BRPM | WEIGHT: 137.6 LBS | HEART RATE: 85 BPM | HEIGHT: 64 IN | TEMPERATURE: 97.3 F | BODY MASS INDEX: 23.49 KG/M2

## 2020-06-08 DIAGNOSIS — Z12.39 BREAST CANCER SCREENING, HIGH RISK PATIENT: ICD-10-CM

## 2020-06-08 DIAGNOSIS — Z86.000 PERSONAL HISTORY OF IN-SITU NEOPLASM OF BREAST: ICD-10-CM

## 2020-06-08 DIAGNOSIS — Z85.3 ENCOUNTER FOR FOLLOW-UP SURVEILLANCE OF BREAST CANCER: Primary | ICD-10-CM

## 2020-06-08 DIAGNOSIS — R92.2 DENSE BREAST TISSUE: ICD-10-CM

## 2020-06-08 DIAGNOSIS — Z08 ENCOUNTER FOR FOLLOW-UP SURVEILLANCE OF BREAST CANCER: Primary | ICD-10-CM

## 2020-06-08 PROCEDURE — 1036F TOBACCO NON-USER: CPT | Performed by: SURGERY

## 2020-06-08 PROCEDURE — 99213 OFFICE O/P EST LOW 20 MIN: CPT | Performed by: SURGERY

## 2020-06-08 PROCEDURE — 3008F BODY MASS INDEX DOCD: CPT | Performed by: SURGERY

## 2020-06-16 ENCOUNTER — OFFICE VISIT (OUTPATIENT)
Dept: OBGYN CLINIC | Facility: CLINIC | Age: 45
End: 2020-06-16
Payer: COMMERCIAL

## 2020-06-16 VITALS
WEIGHT: 138.52 LBS | SYSTOLIC BLOOD PRESSURE: 112 MMHG | BODY MASS INDEX: 23.65 KG/M2 | HEIGHT: 64 IN | DIASTOLIC BLOOD PRESSURE: 54 MMHG

## 2020-06-16 DIAGNOSIS — Z01.419 ENCOUNTER FOR GYNECOLOGICAL EXAMINATION (GENERAL) (ROUTINE) WITHOUT ABNORMAL FINDINGS: Primary | ICD-10-CM

## 2020-06-16 DIAGNOSIS — Z86.000 PERSONAL HISTORY OF IN-SITU NEOPLASM OF BREAST: ICD-10-CM

## 2020-06-16 DIAGNOSIS — Z11.51 SCREENING FOR HUMAN PAPILLOMAVIRUS (HPV): ICD-10-CM

## 2020-06-16 DIAGNOSIS — R14.0 ABDOMINAL BLOATING: ICD-10-CM

## 2020-06-16 PROCEDURE — 99386 PREV VISIT NEW AGE 40-64: CPT | Performed by: OBSTETRICS & GYNECOLOGY

## 2020-06-16 PROCEDURE — 87624 HPV HI-RISK TYP POOLED RSLT: CPT | Performed by: OBSTETRICS & GYNECOLOGY

## 2020-06-16 PROCEDURE — G0145 SCR C/V CYTO,THINLAYER,RESCR: HCPCS | Performed by: OBSTETRICS & GYNECOLOGY

## 2020-06-18 LAB
HPV HR 12 DNA CVX QL NAA+PROBE: NEGATIVE
HPV16 DNA CVX QL NAA+PROBE: NEGATIVE
HPV18 DNA CVX QL NAA+PROBE: NEGATIVE

## 2020-06-23 LAB
LAB AP GYN PRIMARY INTERPRETATION: NORMAL
LAB AP LMP: NORMAL
Lab: NORMAL

## 2020-06-30 ENCOUNTER — TELEPHONE (OUTPATIENT)
Dept: FAMILY MEDICINE CLINIC | Facility: CLINIC | Age: 45
End: 2020-06-30

## 2020-06-30 DIAGNOSIS — Z11.1 SCREENING FOR TUBERCULOSIS: Primary | ICD-10-CM

## 2020-07-10 PROCEDURE — 88342 IMHCHEM/IMCYTCHM 1ST ANTB: CPT | Performed by: PATHOLOGY

## 2020-07-10 PROCEDURE — 88341 IMHCHEM/IMCYTCHM EA ADD ANTB: CPT | Performed by: PATHOLOGY

## 2020-07-10 PROCEDURE — 88305 TISSUE EXAM BY PATHOLOGIST: CPT | Performed by: PATHOLOGY

## 2020-07-11 ENCOUNTER — LAB REQUISITION (OUTPATIENT)
Dept: LAB | Facility: HOSPITAL | Age: 45
End: 2020-07-11
Payer: COMMERCIAL

## 2020-07-11 DIAGNOSIS — D48.5 NEOPLASM OF UNCERTAIN BEHAVIOR OF SKIN: ICD-10-CM

## 2020-07-13 DIAGNOSIS — Z87.09 HISTORY OF UPPER RESPIRATORY INFECTION: Primary | ICD-10-CM

## 2020-07-20 ENCOUNTER — OFFICE VISIT (OUTPATIENT)
Dept: FAMILY MEDICINE CLINIC | Facility: CLINIC | Age: 45
End: 2020-07-20
Payer: COMMERCIAL

## 2020-07-20 VITALS
SYSTOLIC BLOOD PRESSURE: 100 MMHG | OXYGEN SATURATION: 98 % | HEART RATE: 84 BPM | DIASTOLIC BLOOD PRESSURE: 72 MMHG | HEIGHT: 64 IN | WEIGHT: 136.5 LBS | TEMPERATURE: 98 F | BODY MASS INDEX: 23.31 KG/M2

## 2020-07-20 DIAGNOSIS — Z00.00 ANNUAL PHYSICAL EXAM: Primary | ICD-10-CM

## 2020-07-20 DIAGNOSIS — R10.11 RUQ PAIN: ICD-10-CM

## 2020-07-20 PROCEDURE — 99396 PREV VISIT EST AGE 40-64: CPT | Performed by: FAMILY MEDICINE

## 2020-07-20 NOTE — PROGRESS NOTES
[address]    NAME: Po Riley  AGE: 45 y o  SEX: female  : 1975     DATE: 2020     Assessment and Plan:     Problem List Items Addressed This Visit     None      Visit Diagnoses     Annual physical exam    -  Primary    RUQ pain        Relevant Orders    US abdomen complete        I think the patient's epigastric/right upper quadrant pain may very well be something related to reflux but given that it is so focused on the right after she eats I am going to order an ultrasound  She does have an appointment with her gastroenterologist next week so I asked that she get this done before then so they have to review and they can also discuss upper endoscopy  Immunizations and preventive care screenings were discussed with patient today  Appropriate education was printed on patient's after visit summary  Counseling:  Dental Health: discussed importance of regular tooth brushing, flossing, and dental visits  · Exercise: the importance of regular exercise/physical activity was discussed  Recommend exercise 3-5 times per week for at least 30 minutes  No follow-ups on file  Chief Complaint:     Chief Complaint   Patient presents with    Annual Exam     pt here today for her annual exam with school forms  History of Present Illness:     Adult Annual Physical   Patient here for a comprehensive physical exam  The patient reports problems - RUQ pain  Diet and Physical Activity  · Diet/Nutrition: well balanced diet  · Exercise: moderate cardiovascular exercise  Depression Screening  PHQ-9 Depression Screening    PHQ-9:    Frequency of the following problems over the past two weeks:       Little interest or pleasure in doing things:  0 - not at all  Feeling down, depressed, or hopeless:  0 - not at all  PHQ-2 Score:  0       General Health  · Sleep: sleeps well     · Hearing: normal - bilateral   · Vision: goes for regular eye exams and wears glasses  · Dental: regular dental visits  Review of Systems:     Review of Systems   Constitutional: Negative for chills, fever and unexpected weight change  HENT: Negative for congestion, ear pain, hearing loss, postnasal drip, rhinorrhea and sore throat  Eyes: Negative for visual disturbance  Respiratory: Negative for cough and shortness of breath  Cardiovascular: Negative for chest pain  Gastrointestinal: Negative for abdominal pain, constipation, diarrhea, nausea and vomiting  RUQ pain after eating, sometimes worse than others   Genitourinary: Negative for difficulty urinating  Musculoskeletal: Negative for arthralgias and gait problem  Skin: Negative for rash  Neurological: Negative for light-headedness and headaches  Psychiatric/Behavioral: Negative for dysphoric mood and sleep disturbance  The patient is not nervous/anxious         Past Medical History:     Past Medical History:   Diagnosis Date    Anemia     slight    Anesthesia     "after deviated septum surgery area just behind front teeth(incisive papilla) became very inflamed and sore/required prednisone"    Anesthesia complication     teeth chattering so much pt chipped tooth    Anxiety     Balance problem     "when experiencing dizziness"    Deformity of reconstructed breast     Dental crown present     Difficulty swallowing     "at times"    Dizzy spells     "working with PCP"    Ear problem     "damage noted unknown cause, Right"    Fatigue     GERD (gastroesophageal reflux disease)     Nausea     "with dizziness"    Night sweat     occ    Palpitations     PONV (postoperative nausea and vomiting)     Shortness of breath     "randomly"      Past Surgical History:     Past Surgical History:   Procedure Laterality Date    AUGMENTATION BREAST Right 12/18/2019    Procedure: AUGMENTATION BREAST;  Surgeon: Nehal Pete MD;  [facility]: [facility];  Service: Plastics    BREAST RECONSTRUCTION Left 2019    Procedure: RECONSTRUCTION BREAST W/ IMPLANT;  Surgeon: Margaret Mccabe MD;  [facility];  Service: Plastics    CAPSULOTOMY Left 2019    Procedure: CAPSULECTOMY;  Surgeon: Margaret Mccabe MD;  [facility];  Service: Plastics    COLPOSCOPY  2000    INSERTION OF BREAST TISSUE EXPANDER Left 2019    Procedure: INSERTION/PLACEMENT TISSUE EXPANDER (EXCHANGE);   Surgeon: Margaret Mccabe MD;  [facility];  Service: Plastics    LEG SURGERY Left     cyst removed from leg    MASTECTOMY W/ SENTINEL NODE BIOPSY Left 2019    Procedure: MASTECTOMY WITH BIOPSY LYMPH NODE SENTINEL; NUC MED 0730;  Surgeon: Regla Garcia MD;  [facility];  Service: Surgical Oncology    MI IMPLNT BIO IMPLNT FOR SOFT TISSUE REINFORCEMENT Left 2019    Procedure: RECONSTRUCTION BREAST W/ IMPLANT;  Surgeon: Margaret Mccabe MD;  [facility];  Service: Plastics    SINUS SURGERY      US GUIDANCE BREAST BIOPSY RIGHT EACH ADDITIONAL Right 2019    US GUIDED BREAST BIOPSY LEFT COMPLETE Left 2019    WISDOM TOOTH EXTRACTION      WISDOM TOOTH EXTRACTION        Social History:     E-Cigarette/Vaping    E-Cigarette Use Never User      E-Cigarette/Vaping Substances    Nicotine No     THC No     CBD No     Flavoring No     Other No     Unknown No      Social History     Socioeconomic History    Marital status: /Civil Union     Spouse name: None    Number of children: None    Years of education: None    Highest education level: None   Occupational History    Occupation: Student/Patient Care assoc   Social Needs    Financial resource strain: None    Food insecurity:     Worry: None     Inability: None    Transportation needs:     Medical: None     Non-medical: None   Tobacco Use    Smoking status: Former Smoker     Years: 7      Last attempt to quit:      Years since quittin 5    Smokeless tobacco: Never Used    Tobacco comment: was a casual former smoker    Substance and Sexual Activity    Alcohol use: Yes     Frequency: 2-3 times a week     Drinks per session: 1 or 2     Binge frequency: Never     Comment: wine    Drug use: No    Sexual activity: Yes     Partners: Male     Birth control/protection: None   Lifestyle    Physical activity:     Days per week: None     Minutes per session: None    Stress: None   Relationships    Social connections:     Talks on phone: None     Gets together: None     Attends Zoroastrian service: None     Active member of club or organization: None     Attends meetings of clubs or organizations: None     Relationship status: None    Intimate partner violence:     Fear of current or ex partner: None     Emotionally abused: None     Physically abused: None     Forced sexual activity: None   Other Topics Concern    None   Social History Narrative    Pt's menstrual flow is regular lasting 5 days  Cycle is 30-31 days    Pt has had one pregnancy    Last PAP 10/2018    Last mammo was 1/19      Family History:     Family History   Problem Relation Age of Onset    Melanoma Maternal Grandfather 79    Leukemia Maternal Grandmother 80    Glaucoma Other     No Known Problems Mother     No Known Problems Father       Current Medications:     Current Outpatient Medications   Medication Sig Dispense Refill    BIOTIN PO Take by mouth      Multiple Vitamin (MULTIVITAMIN) capsule Take 1 capsule by mouth daily      diazepam (VALIUM) 5 mg tablet Take 1 tablet (5 mg total) by mouth every 6 (six) hours as needed for anxiety (Patient not taking: Reported on 6/8/2020) 15 tablet 0    ibuprofen (MOTRIN) 200 mg tablet Take 200-800 mg by mouth every 6 (six) hours as needed for mild pain       No current facility-administered medications for this visit         Allergies:     No Known Allergies   Physical Exam:     /72 (BP Location: Left arm, Patient Position: Sitting, Cuff Size: Standard)   Pulse 84   Temp 98 °F (36 7 °C)   Ht 5' 3 5" (1 613 m)   Wt 61 9 kg (136 lb 8 oz)   LMP 07/15/2020   SpO2 98%   BMI 23 80 kg/m²     Physical Exam   Constitutional: She is oriented to person, place, and time  She appears well-developed and well-nourished  HENT:   Head: Normocephalic and atraumatic  Right Ear: External ear normal    Left Ear: External ear normal    Nose: Nose normal    Mouth/Throat: Oropharynx is clear and moist    Eyes: Pupils are equal, round, and reactive to light  Conjunctivae and EOM are normal    Neck: Normal range of motion  Neck supple  No thyroid mass and no thyromegaly present  Cardiovascular: Normal rate, regular rhythm and normal heart sounds  Pulmonary/Chest: Effort normal and breath sounds normal    Abdominal: Soft  Normal appearance  There is no tenderness (genralized, mostly epigastric)  Musculoskeletal: Normal range of motion  She exhibits no edema  Lymphadenopathy:     She has no cervical adenopathy  Right: No supraclavicular adenopathy present  Neurological: She is alert and oriented to person, place, and time  Skin: Skin is warm, dry and intact  Psychiatric: She has a normal mood and affect  Her behavior is normal  Judgment and thought content normal    Nursing note and vitals reviewed        MD William Oconnor

## 2020-07-24 ENCOUNTER — APPOINTMENT (OUTPATIENT)
Dept: LAB | Facility: HOSPITAL | Age: 45
End: 2020-07-24
Attending: FAMILY MEDICINE
Payer: COMMERCIAL

## 2020-07-24 DIAGNOSIS — Z87.09 HISTORY OF UPPER RESPIRATORY INFECTION: ICD-10-CM

## 2020-07-24 DIAGNOSIS — Z11.1 SCREENING FOR TUBERCULOSIS: ICD-10-CM

## 2020-07-24 LAB — SARS-COV-2 IGG+IGM SERPL QL IA: NORMAL

## 2020-07-24 PROCEDURE — 36415 COLL VENOUS BLD VENIPUNCTURE: CPT

## 2020-07-24 PROCEDURE — 86480 TB TEST CELL IMMUN MEASURE: CPT

## 2020-07-24 PROCEDURE — 86769 SARS-COV-2 COVID-19 ANTIBODY: CPT

## 2020-07-27 LAB
GAMMA INTERFERON BACKGROUND BLD IA-ACNC: 0.07 IU/ML
M TB IFN-G BLD-IMP: NEGATIVE
M TB IFN-G CD4+ BCKGRND COR BLD-ACNC: -0.01 IU/ML
M TB IFN-G CD4+ BCKGRND COR BLD-ACNC: 0 IU/ML
MITOGEN IGNF BCKGRD COR BLD-ACNC: 5.35 IU/ML

## 2020-07-28 ENCOUNTER — HOSPITAL ENCOUNTER (OUTPATIENT)
Dept: ULTRASOUND IMAGING | Facility: HOSPITAL | Age: 45
Discharge: HOME | End: 2020-07-28
Attending: FAMILY MEDICINE
Payer: COMMERCIAL

## 2020-07-28 ENCOUNTER — HOSPITAL ENCOUNTER (OUTPATIENT)
Dept: ULTRASOUND IMAGING | Facility: HOSPITAL | Age: 45
Discharge: HOME | End: 2020-07-28
Payer: COMMERCIAL

## 2020-07-28 DIAGNOSIS — Z86.000 PERSONAL HISTORY OF IN-SITU NEOPLASM OF BREAST: ICD-10-CM

## 2020-07-28 DIAGNOSIS — R10.11 RUQ PAIN: ICD-10-CM

## 2020-07-28 DIAGNOSIS — R14.0 ABDOMINAL BLOATING: ICD-10-CM

## 2020-07-28 PROCEDURE — 76830 TRANSVAGINAL US NON-OB: CPT

## 2020-07-28 PROCEDURE — 76856 US EXAM PELVIC COMPLETE: CPT

## 2020-07-28 PROCEDURE — 76700 US EXAM ABDOM COMPLETE: CPT

## 2020-07-31 DIAGNOSIS — N80.9 ENDOMETRIOMA: Primary | ICD-10-CM

## 2020-08-11 NOTE — PRE-PROCEDURE INSTRUCTIONS
Pre-Surgery Instructions:   Medication Instructions    BIOTIN PO Instructed patient per Anesthesia Guidelines   ibuprofen (MOTRIN) 200 mg tablet Instructed patient per Anesthesia Guidelines   Multiple Vitamin (MULTIVITAMIN) capsule Instructed patient per Anesthesia Guidelines  Patient given/ instructed on use of chlorhexidine soap per hospital protocol    Patient instructed to stop all ASA, NSAIDS, vitamins and herbal supplements one week prior to surgery or per Dr Lady Segura

## 2020-08-17 ENCOUNTER — APPOINTMENT (OUTPATIENT)
Dept: LAB | Facility: HOSPITAL | Age: 45
End: 2020-08-17
Attending: SURGERY
Payer: COMMERCIAL

## 2020-08-17 ENCOUNTER — TRANSCRIBE ORDERS (OUTPATIENT)
Dept: ADMINISTRATIVE | Facility: HOSPITAL | Age: 45
End: 2020-08-17

## 2020-08-17 DIAGNOSIS — Z01.812 PRE-OPERATIVE LABORATORY EXAMINATION: ICD-10-CM

## 2020-08-17 DIAGNOSIS — Z85.3 PERSONAL HISTORY OF MALIGNANT NEOPLASM OF BREAST: ICD-10-CM

## 2020-08-17 DIAGNOSIS — N65.0 DEFORMITY OF RECONSTRUCTED BREAST: ICD-10-CM

## 2020-08-17 DIAGNOSIS — Z01.818 OTHER SPECIFIED PRE-OPERATIVE EXAMINATION: ICD-10-CM

## 2020-08-17 DIAGNOSIS — N65.0 DEFORMITY OF RECONSTRUCTED BREAST: Primary | ICD-10-CM

## 2020-08-17 LAB
ANION GAP SERPL CALCULATED.3IONS-SCNC: 4 MMOL/L (ref 4–13)
BASOPHILS # BLD AUTO: 0.03 THOUSANDS/ΜL (ref 0–0.1)
BASOPHILS NFR BLD AUTO: 1 % (ref 0–1)
BUN SERPL-MCNC: 14 MG/DL (ref 5–25)
CALCIUM SERPL-MCNC: 8.9 MG/DL (ref 8.3–10.1)
CHLORIDE SERPL-SCNC: 108 MMOL/L (ref 100–108)
CO2 SERPL-SCNC: 27 MMOL/L (ref 21–32)
CREAT SERPL-MCNC: 0.73 MG/DL (ref 0.6–1.3)
EOSINOPHIL # BLD AUTO: 0.06 THOUSAND/ΜL (ref 0–0.61)
EOSINOPHIL NFR BLD AUTO: 1 % (ref 0–6)
ERYTHROCYTE [DISTWIDTH] IN BLOOD BY AUTOMATED COUNT: 12.1 % (ref 11.6–15.1)
GFR SERPL CREATININE-BSD FRML MDRD: 102 ML/MIN/1.73SQ M
GLUCOSE SERPL-MCNC: 92 MG/DL (ref 65–140)
HCT VFR BLD AUTO: 35.2 % (ref 34.8–46.1)
HGB BLD-MCNC: 11.4 G/DL (ref 11.5–15.4)
IMM GRANULOCYTES # BLD AUTO: 0.01 THOUSAND/UL (ref 0–0.2)
IMM GRANULOCYTES NFR BLD AUTO: 0 % (ref 0–2)
LYMPHOCYTES # BLD AUTO: 1.46 THOUSANDS/ΜL (ref 0.6–4.47)
LYMPHOCYTES NFR BLD AUTO: 29 % (ref 14–44)
MCH RBC QN AUTO: 28.5 PG (ref 26.8–34.3)
MCHC RBC AUTO-ENTMCNC: 32.4 G/DL (ref 31.4–37.4)
MCV RBC AUTO: 88 FL (ref 82–98)
MONOCYTES # BLD AUTO: 0.3 THOUSAND/ΜL (ref 0.17–1.22)
MONOCYTES NFR BLD AUTO: 6 % (ref 4–12)
NEUTROPHILS # BLD AUTO: 3.17 THOUSANDS/ΜL (ref 1.85–7.62)
NEUTS SEG NFR BLD AUTO: 63 % (ref 43–75)
NRBC BLD AUTO-RTO: 0 /100 WBCS
PLATELET # BLD AUTO: 278 THOUSANDS/UL (ref 149–390)
PMV BLD AUTO: 11.6 FL (ref 8.9–12.7)
POTASSIUM SERPL-SCNC: 4.2 MMOL/L (ref 3.5–5.3)
RBC # BLD AUTO: 4 MILLION/UL (ref 3.81–5.12)
SODIUM SERPL-SCNC: 139 MMOL/L (ref 136–145)
WBC # BLD AUTO: 5.03 THOUSAND/UL (ref 4.31–10.16)

## 2020-08-17 PROCEDURE — 36415 COLL VENOUS BLD VENIPUNCTURE: CPT

## 2020-08-17 PROCEDURE — 85025 COMPLETE CBC W/AUTO DIFF WBC: CPT

## 2020-08-17 PROCEDURE — 80048 BASIC METABOLIC PNL TOTAL CA: CPT

## 2020-08-18 ENCOUNTER — ANESTHESIA EVENT (OUTPATIENT)
Dept: PERIOP | Facility: HOSPITAL | Age: 45
End: 2020-08-18
Payer: COMMERCIAL

## 2020-08-19 ENCOUNTER — ANESTHESIA (OUTPATIENT)
Dept: PERIOP | Facility: HOSPITAL | Age: 45
End: 2020-08-19
Payer: COMMERCIAL

## 2020-08-19 ENCOUNTER — HOSPITAL ENCOUNTER (OUTPATIENT)
Facility: HOSPITAL | Age: 45
Setting detail: OUTPATIENT SURGERY
Discharge: HOME | End: 2020-08-19
Attending: SURGERY | Admitting: SURGERY
Payer: COMMERCIAL

## 2020-08-19 VITALS
RESPIRATION RATE: 16 BRPM | SYSTOLIC BLOOD PRESSURE: 93 MMHG | WEIGHT: 136 LBS | HEIGHT: 64 IN | HEART RATE: 93 BPM | TEMPERATURE: 97.3 F | BODY MASS INDEX: 23.22 KG/M2 | OXYGEN SATURATION: 100 % | DIASTOLIC BLOOD PRESSURE: 54 MMHG

## 2020-08-19 PROBLEM — N65.1 DEFORMITY AND DISPROPORTION OF RECONSTRUCTED BREAST: Status: ACTIVE | Noted: 2020-08-19

## 2020-08-19 PROBLEM — N65.0 DEFORMITY AND DISPROPORTION OF RECONSTRUCTED BREAST: Status: ACTIVE | Noted: 2020-08-19

## 2020-08-19 LAB
EXT PREGNANCY TEST URINE: NEGATIVE
EXT. CONTROL: NORMAL

## 2020-08-19 PROCEDURE — 81025 URINE PREGNANCY TEST: CPT | Performed by: ANESTHESIOLOGY

## 2020-08-19 RX ORDER — HYDROCODONE BITARTRATE AND ACETAMINOPHEN 5; 325 MG/1; MG/1
1 TABLET ORAL EVERY 4 HOURS PRN
Status: DISCONTINUED | OUTPATIENT
Start: 2020-08-19 | End: 2020-08-19 | Stop reason: HOSPADM

## 2020-08-19 RX ORDER — MIDAZOLAM HYDROCHLORIDE 2 MG/2ML
INJECTION, SOLUTION INTRAMUSCULAR; INTRAVENOUS AS NEEDED
Status: DISCONTINUED | OUTPATIENT
Start: 2020-08-19 | End: 2020-08-19

## 2020-08-19 RX ORDER — MAGNESIUM HYDROXIDE 1200 MG/15ML
LIQUID ORAL AS NEEDED
Status: DISCONTINUED | OUTPATIENT
Start: 2020-08-19 | End: 2020-08-19 | Stop reason: HOSPADM

## 2020-08-19 RX ORDER — DEXAMETHASONE SODIUM PHOSPHATE 4 MG/ML
INJECTION, SOLUTION INTRA-ARTICULAR; INTRALESIONAL; INTRAMUSCULAR; INTRAVENOUS; SOFT TISSUE AS NEEDED
Status: DISCONTINUED | OUTPATIENT
Start: 2020-08-19 | End: 2020-08-19

## 2020-08-19 RX ORDER — FENTANYL CITRATE/PF 50 MCG/ML
50 SYRINGE (ML) INJECTION
Status: DISCONTINUED | OUTPATIENT
Start: 2020-08-19 | End: 2020-08-19 | Stop reason: HOSPADM

## 2020-08-19 RX ORDER — ONDANSETRON 2 MG/ML
INJECTION INTRAMUSCULAR; INTRAVENOUS AS NEEDED
Status: DISCONTINUED | OUTPATIENT
Start: 2020-08-19 | End: 2020-08-19

## 2020-08-19 RX ORDER — FENTANYL CITRATE 50 UG/ML
INJECTION, SOLUTION INTRAMUSCULAR; INTRAVENOUS AS NEEDED
Status: DISCONTINUED | OUTPATIENT
Start: 2020-08-19 | End: 2020-08-19

## 2020-08-19 RX ORDER — MINERAL OIL
OIL (ML) MISCELLANEOUS AS NEEDED
Status: DISCONTINUED | OUTPATIENT
Start: 2020-08-19 | End: 2020-08-19 | Stop reason: HOSPADM

## 2020-08-19 RX ORDER — HYDROMORPHONE HCL/PF 1 MG/ML
0.5 SYRINGE (ML) INJECTION
Status: DISCONTINUED | OUTPATIENT
Start: 2020-08-19 | End: 2020-08-19 | Stop reason: HOSPADM

## 2020-08-19 RX ORDER — MEPERIDINE HYDROCHLORIDE 25 MG/ML
12.5 INJECTION INTRAMUSCULAR; INTRAVENOUS; SUBCUTANEOUS ONCE AS NEEDED
Status: COMPLETED | OUTPATIENT
Start: 2020-08-19 | End: 2020-08-19

## 2020-08-19 RX ORDER — KETOROLAC TROMETHAMINE 30 MG/ML
30 INJECTION, SOLUTION INTRAMUSCULAR; INTRAVENOUS ONCE AS NEEDED
Status: DISCONTINUED | OUTPATIENT
Start: 2020-08-19 | End: 2020-08-19 | Stop reason: HOSPADM

## 2020-08-19 RX ORDER — PROPOFOL 10 MG/ML
INJECTION, EMULSION INTRAVENOUS AS NEEDED
Status: DISCONTINUED | OUTPATIENT
Start: 2020-08-19 | End: 2020-08-19

## 2020-08-19 RX ORDER — ONDANSETRON 2 MG/ML
4 INJECTION INTRAMUSCULAR; INTRAVENOUS ONCE AS NEEDED
Status: DISCONTINUED | OUTPATIENT
Start: 2020-08-19 | End: 2020-08-19 | Stop reason: HOSPADM

## 2020-08-19 RX ORDER — SCOLOPAMINE TRANSDERMAL SYSTEM 1 MG/1
1 PATCH, EXTENDED RELEASE TRANSDERMAL ONCE AS NEEDED
Status: DISCONTINUED | OUTPATIENT
Start: 2020-08-19 | End: 2020-08-19 | Stop reason: HOSPADM

## 2020-08-19 RX ORDER — KETOROLAC TROMETHAMINE 30 MG/ML
INJECTION, SOLUTION INTRAMUSCULAR; INTRAVENOUS AS NEEDED
Status: DISCONTINUED | OUTPATIENT
Start: 2020-08-19 | End: 2020-08-19

## 2020-08-19 RX ORDER — CEFAZOLIN SODIUM 1 G/50ML
1000 SOLUTION INTRAVENOUS ONCE
Status: COMPLETED | OUTPATIENT
Start: 2020-08-19 | End: 2020-08-19

## 2020-08-19 RX ORDER — PROMETHAZINE HYDROCHLORIDE 25 MG/ML
12.5 INJECTION, SOLUTION INTRAMUSCULAR; INTRAVENOUS EVERY 4 HOURS PRN
Status: DISCONTINUED | OUTPATIENT
Start: 2020-08-19 | End: 2020-08-19 | Stop reason: HOSPADM

## 2020-08-19 RX ORDER — ROCURONIUM BROMIDE 10 MG/ML
INJECTION, SOLUTION INTRAVENOUS AS NEEDED
Status: DISCONTINUED | OUTPATIENT
Start: 2020-08-19 | End: 2020-08-19

## 2020-08-19 RX ORDER — HYDROCODONE BITARTRATE AND ACETAMINOPHEN 5; 325 MG/1; MG/1
2 TABLET ORAL EVERY 4 HOURS PRN
Status: DISCONTINUED | OUTPATIENT
Start: 2020-08-19 | End: 2020-08-19 | Stop reason: HOSPADM

## 2020-08-19 RX ORDER — NEOSTIGMINE METHYLSULFATE 1 MG/ML
INJECTION INTRAVENOUS AS NEEDED
Status: DISCONTINUED | OUTPATIENT
Start: 2020-08-19 | End: 2020-08-19

## 2020-08-19 RX ORDER — BUPIVACAINE HYDROCHLORIDE AND EPINEPHRINE 2.5; 5 MG/ML; UG/ML
INJECTION, SOLUTION EPIDURAL; INFILTRATION; INTRACAUDAL; PERINEURAL AS NEEDED
Status: DISCONTINUED | OUTPATIENT
Start: 2020-08-19 | End: 2020-08-19 | Stop reason: HOSPADM

## 2020-08-19 RX ORDER — SODIUM CHLORIDE 9 MG/ML
INJECTION, SOLUTION INTRAVENOUS CONTINUOUS PRN
Status: DISCONTINUED | OUTPATIENT
Start: 2020-08-19 | End: 2020-08-19

## 2020-08-19 RX ORDER — LIDOCAINE HYDROCHLORIDE 20 MG/ML
INJECTION, SOLUTION INFILTRATION; PERINEURAL AS NEEDED
Status: DISCONTINUED | OUTPATIENT
Start: 2020-08-19 | End: 2020-08-19

## 2020-08-19 RX ORDER — GLYCOPYRROLATE 0.2 MG/ML
INJECTION INTRAMUSCULAR; INTRAVENOUS AS NEEDED
Status: DISCONTINUED | OUTPATIENT
Start: 2020-08-19 | End: 2020-08-19

## 2020-08-19 RX ORDER — SODIUM CHLORIDE 9 MG/ML
125 INJECTION, SOLUTION INTRAVENOUS CONTINUOUS
Status: DISCONTINUED | OUTPATIENT
Start: 2020-08-19 | End: 2020-08-19 | Stop reason: HOSPADM

## 2020-08-19 RX ORDER — SODIUM CHLORIDE, SODIUM LACTATE, POTASSIUM CHLORIDE, CALCIUM CHLORIDE 600; 310; 30; 20 MG/100ML; MG/100ML; MG/100ML; MG/100ML
INJECTION, SOLUTION INTRAVENOUS CONTINUOUS PRN
Status: DISCONTINUED | OUTPATIENT
Start: 2020-08-19 | End: 2020-08-19

## 2020-08-19 RX ADMIN — GLYCOPYRROLATE 0.6 MG: 0.2 INJECTION, SOLUTION INTRAMUSCULAR; INTRAVENOUS at 10:50

## 2020-08-19 RX ADMIN — TRIMETHOBENZAMIDE HYDROCHLORIDE 200 MG: 100 INJECTION INTRAMUSCULAR at 11:31

## 2020-08-19 RX ADMIN — ROCURONIUM BROMIDE 50 MG: 10 INJECTION, SOLUTION INTRAVENOUS at 09:38

## 2020-08-19 RX ADMIN — SODIUM CHLORIDE 125 ML/HR: 0.9 INJECTION, SOLUTION INTRAVENOUS at 11:31

## 2020-08-19 RX ADMIN — MIDAZOLAM 2 MG: 1 INJECTION INTRAMUSCULAR; INTRAVENOUS at 09:18

## 2020-08-19 RX ADMIN — ROCURONIUM BROMIDE 20 MG: 10 INJECTION, SOLUTION INTRAVENOUS at 10:28

## 2020-08-19 RX ADMIN — SODIUM CHLORIDE 125 ML/HR: 0.9 INJECTION, SOLUTION INTRAVENOUS at 08:40

## 2020-08-19 RX ADMIN — KETOROLAC TROMETHAMINE 30 MG: 30 INJECTION, SOLUTION INTRAMUSCULAR at 10:50

## 2020-08-19 RX ADMIN — ONDANSETRON 4 MG: 2 INJECTION INTRAMUSCULAR; INTRAVENOUS at 09:18

## 2020-08-19 RX ADMIN — SODIUM CHLORIDE, SODIUM LACTATE, POTASSIUM CHLORIDE, AND CALCIUM CHLORIDE: .6; .31; .03; .02 INJECTION, SOLUTION INTRAVENOUS at 10:04

## 2020-08-19 RX ADMIN — PHENYLEPHRINE HYDROCHLORIDE 200 MCG: 10 INJECTION INTRAVENOUS at 10:42

## 2020-08-19 RX ADMIN — MEPERIDINE HYDROCHLORIDE 12.5 MG: 25 INJECTION INTRAMUSCULAR; INTRAVENOUS; SUBCUTANEOUS at 12:19

## 2020-08-19 RX ADMIN — FENTANYL CITRATE 100 MCG: 50 INJECTION, SOLUTION INTRAMUSCULAR; INTRAVENOUS at 09:47

## 2020-08-19 RX ADMIN — FENTANYL CITRATE 50 MCG: 50 INJECTION, SOLUTION INTRAMUSCULAR; INTRAVENOUS at 11:30

## 2020-08-19 RX ADMIN — FENTANYL CITRATE 50 MCG: 50 INJECTION, SOLUTION INTRAMUSCULAR; INTRAVENOUS at 11:17

## 2020-08-19 RX ADMIN — ONDANSETRON 4 MG: 2 INJECTION INTRAMUSCULAR; INTRAVENOUS at 10:50

## 2020-08-19 RX ADMIN — NEOSTIGMINE METHYLSULFATE 3 MG: 1 INJECTION, SOLUTION INTRAVENOUS at 10:50

## 2020-08-19 RX ADMIN — PROMETHAZINE HYDROCHLORIDE 12.5 MG: 25 INJECTION INTRAMUSCULAR; INTRAVENOUS at 11:46

## 2020-08-19 RX ADMIN — DEXAMETHASONE SODIUM PHOSPHATE 8 MG: 4 INJECTION, SOLUTION INTRAMUSCULAR; INTRAVENOUS at 09:38

## 2020-08-19 RX ADMIN — LIDOCAINE HYDROCHLORIDE 5 ML: 20 INJECTION, SOLUTION INFILTRATION; PERINEURAL at 09:38

## 2020-08-19 RX ADMIN — PHENYLEPHRINE HYDROCHLORIDE 100 MCG: 10 INJECTION INTRAVENOUS at 10:28

## 2020-08-19 RX ADMIN — CEFAZOLIN SODIUM 1000 MG: 1 SOLUTION INTRAVENOUS at 09:07

## 2020-08-19 RX ADMIN — FENTANYL CITRATE 100 MCG: 50 INJECTION, SOLUTION INTRAMUSCULAR; INTRAVENOUS at 09:38

## 2020-08-19 RX ADMIN — SCOPALAMINE 1 PATCH: 1 PATCH, EXTENDED RELEASE TRANSDERMAL at 08:41

## 2020-08-19 RX ADMIN — ONDANSETRON 4 MG: 2 INJECTION INTRAMUSCULAR; INTRAVENOUS at 09:38

## 2020-08-19 RX ADMIN — PROPOFOL 200 MG: 10 INJECTION, EMULSION INTRAVENOUS at 09:38

## 2020-08-19 RX ADMIN — SODIUM CHLORIDE: 0.9 INJECTION, SOLUTION INTRAVENOUS at 09:07

## 2020-08-19 NOTE — OP NOTE
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]     * Personal history of malignant neoplasm of breast [Z85 3]    Procedure(s) (LRB):  BREAST MOUND REVISION WITH (Bilateral)  MOUND FAT GRAFTING (Bilateral)    Specimen(s):  * No specimens in log *    Estimated Blood Loss:   Minimal    Drains:  Closed/Suction Drain Left;Lateral Breast 15 Fr  (Active)   Number of days: 460       Anesthesia Type:   General    Operative Indications:  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]  Personal history of malignant neoplasm of breast [Z85 3]       Operative Findings:  Breast asymmetry    Complications:   None    Procedure and Technique:  Identified correctly on table  Intubated by anesthesia  Placed prone position with adequate padding support  The flank was tumesced with 1 L of our tumescent solution  We went ahead and section of flank for 2 L of fat  We had good contour of the flank area after suction  Stab incisions were closed with 4-0 PDS and Dermabond dressing  At this point we flipped the patient back into the supine position, re-prepped and draped the breast area  Fat was harvested into a Micro-Aire fat grafting canister with bacitracin solution  The harvested fat was filtered and collected into 60 cc syringes  The fat was then injected into the right and left breast respectively  We placed a total of 240 cc into the right breast, 660 cc of fat into the left breast   The fat was injected on the left breast inferior medial medial superior medial superior superior lateral and left axilla  We also placed the left breast centrally for more fat    An 18 gauge needle was used to elevate the scar up off of the underlying soft tissue prior to the fat being placed centrally  On the right breast flat was placed centrally just above the nipple-areolar complex  Stab incisions were then closed with 4-0 PDS and Dermabond dressing  Patient was then awakened surgery taken recovery in stable condition  We had good symmetry of both breast     All counts correct x2         I was present for the entire procedure    Patient Disposition:  PACU     SIGNATURE: Christo Coon MD  DATE: August 19, 2020  TIME: 11:01 AM

## 2020-08-19 NOTE — ANESTHESIA PREPROCEDURE EVALUATION
Review of Systems/Medical History  Patient summary reviewed        Cardiovascular  Negative cardio ROS    Pulmonary  Negative pulmonary ROS        GI/Hepatic  Negative GI/hepatic ROS   GERD well controlled,        Negative  ROS        Endo/Other  Negative endo/other ROS      GYN  Negative gynecology ROS          Hematology  Negative hematology ROS Anemia ,     Musculoskeletal  Negative musculoskeletal ROS        Neurology  Negative neurology ROS      Psychology   Negative psychology ROS Anxiety,              Physical Exam    Airway    Mallampati score: II  TM Distance: >3 FB  Neck ROM: full     Dental   No notable dental hx     Cardiovascular  Comment: Negative ROS, Rhythm: regular, Rate: normal, Cardiovascular exam normal    Pulmonary  Pulmonary exam normal Breath sounds clear to auscultation,     Other Findings        Anesthesia Plan  ASA Score- 2     Anesthesia Type- general with ASA Monitors  Additional Monitors:   Airway Plan: ETT  Plan Factors-    Induction- intravenous  Postoperative Plan-     Informed Consent- Anesthetic plan and risks discussed with patient  Review of Systems/Medical History  Patient summary reviewed  Chart reviewed    History of anesthetic complications PONV    Cardiovascular  Negative cardio ROS    Pulmonary  Shortness of breath,        GI/Hepatic    GERD well controlled,   Comment: LPR Hx     Negative  ROS        Endo/Other  Negative endo/other ROS      GYN    Breast cancer left mastectomy       Hematology  Anemia ,     Musculoskeletal  Negative musculoskeletal ROS        Neurology  Negative neurology ROS      Psychology   Anxiety,              Physical Exam    Airway    Mallampati score: II  TM Distance: >3 FB  Neck ROM: full     Dental   No notable dental hx     Cardiovascular  Comment: Negative ROS, Rhythm: regular, Rate: normal, Cardiovascular exam normal    Pulmonary  Pulmonary exam normal Breath sounds clear to auscultation,     Other Findings        Anesthesia Plan  ASA Score- 2     Anesthesia Type- general with ASA Monitors  Additional Monitors:   Airway Plan: ETT  Comment: SCOP patch ordered  Pt says she had "shivering" post-mastectomy, to point that she "chipped a tooth"  Was tx with meds in PACU  Plan Factors-    Chart reviewed  Patient summary reviewed  Patient not instructed to abstain from smoking on day of procedure  Patient did not smoke on day of surgery  Induction- intravenous  Postoperative Plan-     Informed Consent- Anesthetic plan and risks discussed with patient and spouse [place])

## 2020-08-19 NOTE — DISCHARGE SUMMARY
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]  Personal history of malignant neoplasm of breast [Z85 3]  Principal Procedure:   BREAST MOUND REVISION WITH (Bilateral Breast)  MOUND FAT GRAFTING (Bilateral Buttocks)  Discharge Medications:  See after visit summary for reconciled discharge medications provided to patient and family  Reason for Admission:  Alverto Weeks was electively admitted to undergo the above named procedure on 8/19/2020 as an outpatient  Hospital Course:  Patient underwent the above named procedure on the day of admission without complications  They were discharged home the same day  Disposition:  To home in care of self and family    Condition:  Good  Follow up:  Patient with follow up in the office with Dr Leopoldo Groves MD in 2 week(s) or as scheduled per his office  Leopoldo Groves MD  8/19/2020 11:00 AM

## 2020-08-19 NOTE — ANESTHESIA POSTPROCEDURE EVALUATION
Post-Op Assessment Note    CV Status:  Stable    Pain management: adequate     Mental Status:  Alert and awake   Hydration Status:  Euvolemic   PONV Controlled:  Controlled   Airway Patency:  Patent      Post Op Vitals Reviewed: Yes      Staff: Anesthesiologist         No complications documented      BP 93/54 (08/19/20 1335)    Temp     Pulse 93 (08/19/20 1335)   Resp 16 (08/19/20 1335)    SpO2 100 % (08/19/20 1335)

## 2020-08-19 NOTE — DISCHARGE INSTRUCTIONS
[facility]  Postoperative Instructions for Outpatient Surgery  [address] [facility], [address] [facility], [address], [place], [address] [facility] [place] / [place]  / [email or web address]      These  instructions are being provided by you doctor to give you basic guidelines during you post-op recovery  Please let our office know your contact information has changed  Please call the office today to schedule a post operative appointment, and tell the office staff  that you doctor needs see you in our office in 10-14 days  Dressing:          No dressing required    Apply ice as needed       Bathing      Showering permitted              Medication    Resume preparative medication  Skin glue was applied to area  Motrin or Tylenol is OK    Other Instruction: massage daily,  Shower daily      Activities  Normal activities    You may drive when you are off you pain medications  Walking permitted  Bruising, and welling I expected  incision and surrounding area  It is normal to have a slight fever after surgery  If the fever I above 101 5 please call our office  If you have a drain, leaking around the drain it may occur  The normal  Occasionally, a drain may clog  If this happens carefully remove the bulb and try milking the obstruction  out of the tubing  Garments after liposuction will become soiled  You should protect area where you will sitting or lying  The majority of the drainage should subside within 48 hrs  Do Not remove garment unless otherwise instructed  A side effect of the pain medication is constipation  If this dose happen your doctor recommends that you take Senokot, Colace or something over the counter for this  Do not hesitate to call the office at [phone] if you have any questions about your surgery  The nursing staff will be glad to assist you in any possible way   If it is necessary for you to contract a doctor when the office is closed or on the weekend, please call [phone] and it will direct you to the answering service  A physician will contact you to assist you with any problems or questions

## 2020-08-19 NOTE — INTERVAL H&P NOTE
H&P reviewed  After examining the patient I find no changes in the patients condition since the H&P had been written      Vitals:    08/19/20 0816   BP: 130/65   Pulse: 80   Resp: 16   Temp: (!) 97 °F (36 1 °C)   SpO2: 100%

## 2020-09-02 ENCOUNTER — OFFICE VISIT (OUTPATIENT)
Dept: URGENT CARE | Facility: CLINIC | Age: 45
End: 2020-09-02
Payer: COMMERCIAL

## 2020-09-02 VITALS
SYSTOLIC BLOOD PRESSURE: 111 MMHG | RESPIRATION RATE: 16 BRPM | BODY MASS INDEX: 23.39 KG/M2 | TEMPERATURE: 97.1 F | OXYGEN SATURATION: 99 % | WEIGHT: 137 LBS | DIASTOLIC BLOOD PRESSURE: 62 MMHG | HEIGHT: 64 IN | HEART RATE: 90 BPM

## 2020-09-02 DIAGNOSIS — R21 RASH: Primary | ICD-10-CM

## 2020-09-02 PROCEDURE — G0382 LEV 3 HOSP TYPE B ED VISIT: HCPCS | Performed by: PHYSICIAN ASSISTANT

## 2020-09-02 NOTE — PROGRESS NOTES
NAME: Alex Wheeler is a 45 y o  female  : 1975    MRN: [id number]      Assessment and Plan   Rash [R21]  1  Rash       discussed with patient- appears to be a systemic allergic reaction - she is stable  Offered patient prednisone- she refuses and states she does not want to take prednisone  Discussed benadryl and monitoring for any new products she may have missed  F/u with PCP / allergist for further evaluation  If anything changes or worsens before then go to the ER  She acknowledges  Patient Instructions     Patient Instructions   Acute Rash   WHAT YOU NEED TO KNOW:   A rash is irritation, redness, or itchiness in the skin or mucus membranes  Mucus membranes are areas such as the lining of your nose or throat  Acute means the rash starts suddenly, worsens quickly, and lasts a short time  An acute rash may be caused by a disease, such as hepatitis or vasculitis  The rash may be a reaction to something you are allergic to, such as certain foods, or latex  Certain medicines, including antibiotics, NSAIDs, prescription pain medicine, and aspirin can also cause a rash  DISCHARGE INSTRUCTIONS:   Return to the emergency department if:   · You have sudden trouble breathing or chest pain  · You are vomiting, have a headache or muscle aches, and your throat hurts  Contact your healthcare provider if:   · You have a fever  · You get open wounds from scratching your skin, or you have a wound that is red, swollen, or painful  · Your rash lasts longer than 3 months  · You have swelling or pain in your joints  · You have questions or concerns about your condition or care  Medicines:  If your rash does not go away on its own, you may need the following medicines:  · Antihistamines  may be given to help decrease itching  · Steroids  may be given to decrease inflammation  · Antibiotics  help fight or prevent a bacterial infection  · Take your medicine as directed    Contact your healthcare provider if you think your medicine is not helping or if you have side effects  Tell him of her if you are allergic to any medicine  Keep a list of the medicines, vitamins, and herbs you take  Include the amounts, and when and why you take them  Bring the list or the pill bottles to follow-up visits  Carry your medicine list with you in case of an emergency  Prevent a rash or care for your skin when you have a rash:  Dry skin can lead to more problems  Do not scratch your skin if it itches  You may cause a skin infection by scratching  The following may prevent dry skin, and help your skin look better:  · Use thick cream lotions or petroleum jelly to help soothe your rash  These products work well on areas with thick skin, such as your feet  Cool compresses may also be used to soothe your skin  Apply a cool compress or a cool, wet towel, and then cover it with a dry towel  · Use lukewarm water when you bathe  Hot water may damage your skin more  Pat your skin dry  Do not rub your skin with a towel  · Use detergents, soaps, shampoos, and bubble baths made for sensitive skin  Wear clothes that are made of cotton instead of nylon or wool  Cotton is softer, so it will not hurt your skin as much  Follow up with your healthcare provider as directed: You may need to see a dermatologist if healthcare providers do not know what is causing your rash  You may also need to see a dermatologist if your rash does not get better even with treatment  You may need to see a dietitian if you have allergies to foods  Write down your questions so you remember to ask them during your visits  © 2017 [address] Information is for End User's use only and may not be sold, redistributed or otherwise used for commercial purposes  All illustrations and images included in CareNotes® are the copyrighted property of [employer]  or [employer] Subramanian  The above information is an  only  It is not intended as medical advice for individual conditions or treatments  Talk to your doctor, nurse or pharmacist before following any medical regimen to see if it is safe and effective for you  Proceed to ER if symptoms worsen  Chief Complaint     Chief Complaint   Patient presents with    Rash     Pt notice a rash all over her body around 9am this morning,          History of Present Illness   Patient with hx of breast cancer s/p mound fat revision surgery on August 19th presents complaining of a rash x 1 day  States this morning she started to notice a red rash to her entire body  States it is not painful and states not itchy either  Denies fever, chills, body aches, cough, congestion, SOB, CP, palpitations, headaches, dizziness or LH  Denies any new medications, foods, lotions, creams, detergents, or soaps  States she was just on a 10 day course of keflex and clindamycin  States she has been on keflex before but never on clinda  States she finished the meds a few days ago  Surgical sites do not appear infected  Review of Systems   Review of Systems   Constitutional: Negative for chills, fatigue and fever  HENT: Negative for ear pain, rhinorrhea, sinus pressure, sinus pain, sore throat and trouble swallowing  Respiratory: Negative for cough, chest tightness, shortness of breath, wheezing and stridor  Gastrointestinal: Negative for abdominal pain, diarrhea, nausea and vomiting  Musculoskeletal: Negative for back pain, neck pain and neck stiffness  Skin: Positive for rash  Neurological: Negative for dizziness, light-headedness and headaches           Current Medications       Current Outpatient Medications:     BIOTIN PO, Take by mouth, Disp: , Rfl:     Multiple Vitamin (MULTIVITAMIN) capsule, Take 1 capsule by mouth daily, Disp: , Rfl:     diazepam (VALIUM) 5 mg tablet, Take 1 tablet (5 mg total) by mouth every 6 (six) hours as needed for anxiety (Patient not taking: Reported on 6/8/2020), Disp: 15 tablet, Rfl: 0    ibuprofen (MOTRIN) 200 mg tablet, Take 200-800 mg by mouth every 6 (six) hours as needed for mild pain, Disp: , Rfl:     Current Allergies     Allergies as of 09/02/2020    (No Known Allergies)              Past Medical History:   Diagnosis Date    Anemia     slight    Anesthesia     "after deviated septum surgery area just behind front teeth(incisive papilla) became very inflamed and sore/required prednisone"    Anesthesia complication     teeth chattering so much pt chipped tooth    Anxiety     Balance problem     "when experiencing dizziness" "from vertigo"    Cancer ([address] )     left breast    Deformity of reconstructed breast     left breast "some pockets"    Dental crown present     Dental crown present     Difficulty swallowing     "at times"    Dizzy spells     "working with PCP"    Ear problem     "damage noted unknown cause, Right"    Fatigue     GERD (gastroesophageal reflux disease)     Limb alert care status     No BP/IV left side    Motion sickness     Nausea     "with dizziness"    Night sweat     occ    Palpitations     PONV (postoperative nausea and vomiting)     "had scop patch last surgery was helpful"    Shortness of breath     "randomly"    Tinnitus     occas       Past Surgical History:   Procedure Laterality Date    AUGMENTATION BREAST Right 12/18/2019    Procedure: AUGMENTATION BREAST;  Surgeon: Charles Concepcion MD;  [facility];  Service: Plastics    BREAST RECONSTRUCTION Left 12/18/2019    Procedure: RECONSTRUCTION BREAST W/ IMPLANT;  Surgeon: Charles Concepcion MD;  [facility];  Service: Plastics    CAPSULOTOMY Left 12/18/2019    Procedure: CAPSULECTOMY;  Surgeon: Charles Concepcion MD;  [facility];  Service: Plastics    COLPOSCOPY  2000    INSERTION OF BREAST TISSUE EXPANDER Left 5/17/2019    Procedure: INSERTION/PLACEMENT TISSUE EXPANDER (EXCHANGE);   Surgeon: Charles Concepcion MD;  [facility];  Service: Plastics    LEG SURGERY Left     cyst removed from leg    LIPOSUCTION W/ FAT INJECTION Bilateral 8/19/2020    Procedure: MOUND FAT GRAFTING;  Surgeon: Belia Mir MD;  [facility];  Service: Plastics    MASTECTOMY W/ SENTINEL NODE BIOPSY Left 5/17/2019    Procedure: MASTECTOMY WITH BIOPSY LYMPH NODE SENTINEL; NUC MED 0730;  Surgeon: Rafael Mathew MD;  [facility];  Service: Surgical Oncology    NH IMPLNT BIO IMPLNT FOR SOFT TISSUE REINFORCEMENT Left 5/17/2019    Procedure: RECONSTRUCTION BREAST W/ IMPLANT;  Surgeon: Belia Mir MD;  [facility];  Service: Plastics    NH REVISE BREAST RECONSTRUCTION Bilateral 8/19/2020    Procedure: BREAST MOUND REVISION WITH;  Surgeon: Belia Mir MD;  [facility];  Service: Plastics    SINUS SURGERY      US GUIDANCE BREAST BIOPSY RIGHT EACH ADDITIONAL Right 2/20/2019    US GUIDED BREAST BIOPSY LEFT COMPLETE Left 2/20/2019    WISDOM TOOTH EXTRACTION      WISDOM TOOTH EXTRACTION         Family History   Problem Relation Age of Onset    Melanoma Maternal Grandfather 79    Leukemia Maternal Grandmother 80    Glaucoma Other     No Known Problems Mother     No Known Problems Father          Medications have been verified  The following portions of the patient's history were reviewed and updated as appropriate: allergies, current medications, past family history, past medical history, past social history, past surgical history and problem list     Objective   /62   Pulse 90   Temp (!) 97 1 °F (36 2 °C)   Resp 16   Ht 5' 4" (1 626 m)   Wt 62 1 kg (137 lb)   LMP 08/06/2020 (Approximate)   SpO2 99%   BMI 23 52 kg/m²      Physical Exam     Physical Exam  Vitals signs and nursing note reviewed  Constitutional:       General: She is not in acute distress  Appearance: Normal appearance  She is not ill-appearing, toxic-appearing or diaphoretic     Cardiovascular:      Rate and Rhythm: Normal rate and regular rhythm  Heart sounds: No murmur  Pulmonary:      Effort: Pulmonary effort is normal  No respiratory distress  Breath sounds: Normal breath sounds  No stridor  No wheezing, rhonchi or rales  Skin:     Capillary Refill: Capillary refill takes less than 2 seconds  Comments: Erythematous macular rash diffusely spread to b/l arms, abdomen, chest, back, legs and neck  Blanches on palpation  NTTP  No surrounding erythema or edema  Not hot to touch  No vesicles or scabbing  No excoriation marks  Sensation intact to light touch  Capillary refill < 2 seconds  Neurological:      General: No focal deficit present  Mental Status: She is alert and oriented to person, place, and time

## 2020-09-02 NOTE — PATIENT INSTRUCTIONS
Acute Rash   WHAT YOU NEED TO KNOW:   A rash is irritation, redness, or itchiness in the skin or mucus membranes  Mucus membranes are areas such as the lining of your nose or throat  Acute means the rash starts suddenly, worsens quickly, and lasts a short time  An acute rash may be caused by a disease, such as hepatitis or vasculitis  The rash may be a reaction to something you are allergic to, such as certain foods, or latex  Certain medicines, including antibiotics, NSAIDs, prescription pain medicine, and aspirin can also cause a rash  DISCHARGE INSTRUCTIONS:   Return to the emergency department if:   · You have sudden trouble breathing or chest pain  · You are vomiting, have a headache or muscle aches, and your throat hurts  Contact your healthcare provider if:   · You have a fever  · You get open wounds from scratching your skin, or you have a wound that is red, swollen, or painful  · Your rash lasts longer than 3 months  · You have swelling or pain in your joints  · You have questions or concerns about your condition or care  Medicines:  If your rash does not go away on its own, you may need the following medicines:  · Antihistamines  may be given to help decrease itching  · Steroids  may be given to decrease inflammation  · Antibiotics  help fight or prevent a bacterial infection  · Take your medicine as directed  Contact your healthcare provider if you think your medicine is not helping or if you have side effects  Tell him of her if you are allergic to any medicine  Keep a list of the medicines, vitamins, and herbs you take  Include the amounts, and when and why you take them  Bring the list or the pill bottles to follow-up visits  Carry your medicine list with you in case of an emergency  Prevent a rash or care for your skin when you have a rash:  Dry skin can lead to more problems  Do not scratch your skin if it itches  You may cause a skin infection by scratching   The following may prevent dry skin, and help your skin look better:  · Use thick cream lotions or petroleum jelly to help soothe your rash  These products work well on areas with thick skin, such as your feet  Cool compresses may also be used to soothe your skin  Apply a cool compress or a cool, wet towel, and then cover it with a dry towel  · Use lukewarm water when you bathe  Hot water may damage your skin more  Pat your skin dry  Do not rub your skin with a towel  · Use detergents, soaps, shampoos, and bubble baths made for sensitive skin  Wear clothes that are made of cotton instead of nylon or wool  Cotton is softer, so it will not hurt your skin as much  Follow up with your healthcare provider as directed: You may need to see a dermatologist if healthcare providers do not know what is causing your rash  You may also need to see a dermatologist if your rash does not get better even with treatment  You may need to see a dietitian if you have allergies to foods  Write down your questions so you remember to ask them during your visits  © 2017 [address] [facility] Information is for End User's use only and may not be sold, redistributed or otherwise used for commercial purposes  All illustrations and images included in CareNotes® are the copyrighted property of [employer]  or [employer] Subramanian  The above information is an  only  It is not intended as medical advice for individual conditions or treatments  Talk to your doctor, nurse or pharmacist before following any medical regimen to see if it is safe and effective for you

## 2020-09-08 ENCOUNTER — TELEMEDICINE (OUTPATIENT)
Dept: OBGYN CLINIC | Facility: CLINIC | Age: 45
End: 2020-09-08
Payer: COMMERCIAL

## 2020-09-08 DIAGNOSIS — R10.2 PELVIC PAIN IN FEMALE: Primary | ICD-10-CM

## 2020-09-08 PROCEDURE — 99213 OFFICE O/P EST LOW 20 MIN: CPT | Performed by: OBSTETRICS & GYNECOLOGY

## 2020-09-11 NOTE — PROGRESS NOTES
Virtual Regular Visit      Assessment/Plan:    Problem List Items Addressed This Visit     Known probable endometrioma  New-onset pelvic pain/dysmenorrhea     We discussed in detail need to perform [facility] in order to diagnose endometriosis  Given symptoms, sono findings, and family h/o I feel she is at risk for the disease  Unfortunately she is not a candidate for hormone manipulation due to h/o breast CA  Patient is recovering from recent breast reconstruction as well  We will plan to observe symptoms for the next 2-3 months  If symptoms worsen and/or persist will proceed with [facility]  Reason for visit is   Chief Complaint   Patient presents with    Gynecology Problem     Painful menses- passing painful gas, bowel mvt was solid, not painful  Bloated, belly tender, painful  Last U/S showed Endometrioma- Wants to know:Could this be the cause? Menses started Monday Saturday is when the pain started  Feels like a muscle spasm, perineal area right side   Virtual Regular Visit        Encounter provider George Zaman MD    Provider located at [address] [place] [address]  [phone]      Recent Visits  Date Type Provider Dept   09/08/20 [address] Zac Curtis MD [facility] recent visits within past 7 days and meeting all other requirements     Future Appointments  No visits were found meeting these conditions  Showing future appointments within next 150 days and meeting all other requirements        The patient was identified by name and date of birth  Kyle Harrison was informed that this is a telemedicine visit and that the visit is being conducted through [employer] App  My office door was closed  No one else was in the room  She acknowledged consent and understanding of privacy and security of the video platform   The patient has agreed to participate and understands they can discontinue the visit at any time  Patient is aware this is a billable service  Thomas Evans is a 45 y o  female who had noted increasing abdominal bloating but now notes increased pelvic pain and dysmenorrhea  Recent pelvic sono showed small endometrioma  She denies any changes in urinary function but does note rectal pain with passage of flatus        Past Medical History:   Diagnosis Date    Anemia     slight    Anesthesia     "after deviated septum surgery area just behind front teeth(incisive papilla) became very inflamed and sore/required prednisone"    Anesthesia complication     teeth chattering so much pt chipped tooth    Anxiety     Balance problem     "when experiencing dizziness" "from vertigo"    Cancer ([address] )     left breast    Deformity of reconstructed breast     left breast "some pockets"    Dental crown present     Dental crown present     Difficulty swallowing     "at times"    Dizzy spells     "working with PCP"    Ear problem     "damage noted unknown cause, Right"    Fatigue     GERD (gastroesophageal reflux disease)     Limb alert care status     No BP/IV left side    Motion sickness     Nausea     "with dizziness"    Night sweat     occ    Palpitations     PONV (postoperative nausea and vomiting)     "had scop patch last surgery was helpful"    Shortness of breath     "randomly"    Tinnitus     occas       Past Surgical History:   Procedure Laterality Date    AUGMENTATION BREAST Right 12/18/2019    Procedure: AUGMENTATION BREAST;  Surgeon: Ryder Sellers MD;  [facility];  Service: Plastics    BREAST RECONSTRUCTION Left 12/18/2019    Procedure: RECONSTRUCTION BREAST W/ IMPLANT;  Surgeon: Ryder Sellers MD;  [facility];  Service: Plastics    CAPSULOTOMY Left 12/18/2019    Procedure: CAPSULECTOMY;  Surgeon: Ryder Sellers MD;  [facility];  Service: Plastics    COLPOSCOPY  2000    INSERTION OF BREAST TISSUE EXPANDER Left 5/17/2019    Procedure: INSERTION/PLACEMENT TISSUE EXPANDER (EXCHANGE); Surgeon: Beryl Osgood, MD;  [facility];  Service: Plastics    LEG SURGERY Left     cyst removed from leg    LIPOSUCTION W/ FAT INJECTION Bilateral 8/19/2020    Procedure: MOUND FAT GRAFTING;  Surgeon: Beryl Osgood, MD;  [facility];  Service: Plastics    MASTECTOMY W/ SENTINEL NODE BIOPSY Left 5/17/2019    Procedure: MASTECTOMY WITH BIOPSY LYMPH NODE SENTINEL; NUC MED 0730;  Surgeon: Veto Eisenmenger, MD;  [facility];  Service: Surgical Oncology    HI IMPLNT BIO IMPLNT FOR SOFT TISSUE REINFORCEMENT Left 5/17/2019    Procedure: RECONSTRUCTION BREAST W/ IMPLANT;  Surgeon: Beryl Osgood, MD;  [facility];  Service: Plastics    HI REVISE BREAST RECONSTRUCTION Bilateral 8/19/2020    Procedure: BREAST MOUND REVISION WITH;  Surgeon: Beryl Osgood, MD;  [facility];  Service: Plastics    SINUS SURGERY      US GUIDANCE BREAST BIOPSY RIGHT EACH ADDITIONAL Right 2/20/2019    US GUIDED BREAST BIOPSY LEFT COMPLETE Left 2/20/2019    WISDOM TOOTH EXTRACTION      WISDOM TOOTH EXTRACTION         Current Outpatient Medications   Medication Sig Dispense Refill    BIOTIN PO Take by mouth      ibuprofen (MOTRIN) 200 mg tablet Take 200-800 mg by mouth every 6 (six) hours as needed for mild pain      Multiple Vitamin (MULTIVITAMIN) capsule Take 1 capsule by mouth daily      diazepam (VALIUM) 5 mg tablet Take 1 tablet (5 mg total) by mouth every 6 (six) hours as needed for anxiety (Patient not taking: Reported on 6/8/2020) 15 tablet 0     No current facility-administered medications for this visit  Family History   Problem Relation Age of Onset    Melanoma Maternal Grandfather 79    Leukemia Maternal Grandmother 80    Glaucoma Other     Endometriosis/Adenomyosis?  Mother     No Known Problems Father        No Known Allergies    Review of Systems:  See HPI    Video Exam    There were no vitals filed for this visit  Physical Exam  Constitutional:       Appearance: Normal appearance  HENT:      Head: Normocephalic  Pulmonary:      Effort: Pulmonary effort is normal    Musculoskeletal:         General: No swelling  Neurological:      General: No focal deficit present  Mental Status: She is alert and oriented to person, place, and time  Psychiatric:         Mood and Affect: Mood normal          Behavior: Behavior normal          I spent 20 minutes directly with the patient during this visit      VIRTUAL VISIT Tamy 82 acknowledges that she has consented to an online visit or consultation  She understands that the online visit is based solely on information provided by her, and that, in the absence of a face-to-face physical evaluation by the physician, the diagnosis she receives is both limited and provisional in terms of accuracy and completeness  This is not intended to replace a full medical face-to-face evaluation by the physician  Lynne Riley understands and accepts these terms

## 2020-12-11 ENCOUNTER — HOSPITAL ENCOUNTER (OUTPATIENT)
Dept: ULTRASOUND IMAGING | Facility: HOSPITAL | Age: 45
Discharge: HOME | End: 2020-12-11
Payer: COMMERCIAL

## 2020-12-11 DIAGNOSIS — N80.9 ENDOMETRIOMA: ICD-10-CM

## 2020-12-11 PROCEDURE — 76830 TRANSVAGINAL US NON-OB: CPT

## 2020-12-11 PROCEDURE — 76856 US EXAM PELVIC COMPLETE: CPT

## 2020-12-15 ENCOUNTER — HOSPITAL ENCOUNTER (OUTPATIENT)
Dept: MAMMOGRAPHY | Facility: CLINIC | Age: 45
Discharge: HOME | End: 2020-12-15
Payer: COMMERCIAL

## 2020-12-15 VITALS — HEIGHT: 64 IN | WEIGHT: 137 LBS | BODY MASS INDEX: 23.39 KG/M2

## 2020-12-15 DIAGNOSIS — Z12.39 BREAST CANCER SCREENING, HIGH RISK PATIENT: ICD-10-CM

## 2020-12-15 DIAGNOSIS — Z12.31 SCREENING MAMMOGRAM FOR HIGH-RISK PATIENT: ICD-10-CM

## 2020-12-15 PROCEDURE — 77067 SCR MAMMO BI INCL CAD: CPT

## 2020-12-15 PROCEDURE — 77063 BREAST TOMOSYNTHESIS BI: CPT

## 2020-12-21 ENCOUNTER — IMMUNIZATIONS (OUTPATIENT)
Dept: FAMILY MEDICINE CLINIC | Facility: HOSPITAL | Age: 45
End: 2020-12-21
Payer: COMMERCIAL

## 2020-12-21 ENCOUNTER — TELEPHONE (OUTPATIENT)
Dept: SURGICAL ONCOLOGY | Facility: CLINIC | Age: 45
End: 2020-12-21

## 2020-12-21 DIAGNOSIS — Z23 ENCOUNTER FOR IMMUNIZATION: ICD-10-CM

## 2020-12-21 PROCEDURE — 0001A SARS-COV-2 / COVID-19 MRNA VACCINE (PFIZER-BIONTECH) 30 MCG: CPT

## 2020-12-21 PROCEDURE — 91300 SARS-COV-2 / COVID-19 MRNA VACCINE (PFIZER-BIONTECH) 30 MCG: CPT

## 2020-12-22 ENCOUNTER — OFFICE VISIT (OUTPATIENT)
Dept: SURGICAL ONCOLOGY | Facility: CLINIC | Age: 45
End: 2020-12-22
Payer: COMMERCIAL

## 2020-12-22 VITALS
HEIGHT: 64 IN | SYSTOLIC BLOOD PRESSURE: 98 MMHG | HEART RATE: 67 BPM | WEIGHT: 138 LBS | TEMPERATURE: 97.5 F | BODY MASS INDEX: 23.56 KG/M2 | DIASTOLIC BLOOD PRESSURE: 60 MMHG

## 2020-12-22 DIAGNOSIS — R92.2 DENSE BREAST TISSUE: ICD-10-CM

## 2020-12-22 DIAGNOSIS — Z85.3 ENCOUNTER FOR FOLLOW-UP SURVEILLANCE OF BREAST CANCER: Primary | ICD-10-CM

## 2020-12-22 DIAGNOSIS — Z08 ENCOUNTER FOR FOLLOW-UP SURVEILLANCE OF BREAST CANCER: Primary | ICD-10-CM

## 2020-12-22 DIAGNOSIS — Z86.000 PERSONAL HISTORY OF IN-SITU NEOPLASM OF BREAST: ICD-10-CM

## 2020-12-22 PROCEDURE — 99213 OFFICE O/P EST LOW 20 MIN: CPT | Performed by: SURGERY

## 2021-01-05 ENCOUNTER — TELEPHONE (OUTPATIENT)
Dept: OBGYN CLINIC | Facility: CLINIC | Age: 46
End: 2021-01-05

## 2021-01-05 NOTE — TELEPHONE ENCOUNTER
----- Message from Amaury Gillespie MD sent at 1/4/2021 12:23 PM EST -----  I left a voicemail for patient on 12/22 to call me back - she has not done so yet  Can you see of she got my message?

## 2021-01-05 NOTE — TELEPHONE ENCOUNTER
Spoke to pt and she is taking a winter class so schedule is hectic  She did get the msg and she said free times for her to get a call are anytime's in am around 9am  But Tuesdays are not good at all

## 2021-01-10 ENCOUNTER — IMMUNIZATIONS (OUTPATIENT)
Dept: FAMILY MEDICINE CLINIC | Facility: HOSPITAL | Age: 46
End: 2021-01-10

## 2021-01-10 DIAGNOSIS — Z23 ENCOUNTER FOR IMMUNIZATION: ICD-10-CM

## 2021-01-10 PROCEDURE — 91300 SARS-COV-2 / COVID-19 MRNA VACCINE (PFIZER-BIONTECH) 30 MCG: CPT

## 2021-01-10 PROCEDURE — 0002A SARS-COV-2 / COVID-19 MRNA VACCINE (PFIZER-BIONTECH) 30 MCG: CPT

## 2021-01-13 NOTE — PROGRESS NOTES
Patient Past Medical History: None          Allergies: NKA        Past Breast History:     Previous Biopsy:   Yes______ No____x____      Breast: Right____ Left______       Malignant______ Benign_______      Treatments if applicable        Present Breast History:     Right__________    Left______x_______     Density_________    Calcifications_____x_______   Palpable______________      Patient notified of results on: 2/25/19 by Dr Nigel Frank    Date of Appointment with Surgeon Dr Lilly Bowers on 3/6/19 @ 1 pm Use Enhanced Medication Counseling?: No

## 2021-01-20 ENCOUNTER — TELEPHONE (OUTPATIENT)
Dept: OBGYN CLINIC | Facility: CLINIC | Age: 46
End: 2021-01-20

## 2021-01-20 NOTE — TELEPHONE ENCOUNTER
STP:  Reviewed sono findings and recommendations  Will see PCP re: colonoscopy  Repeat sono 6 months unless symptoms change

## 2021-01-20 NOTE — TELEPHONE ENCOUNTER
Patient called in because she had some tests run back in December and she was not able to connect about where to go from here and what the next steps are  Patient is wondering if she should schedule a visit with the doctor  Patient is off work today and able to receive calls and tomorrow works 7-3pm tomorrow      Please advise    Best number ending in: [phone]

## 2021-01-20 NOTE — TELEPHONE ENCOUNTER
Spoke to pt and she is off today, she knows Dr Becca Davis was trying to reach her by phone few wks ago  Told pt MIRNA was oncall today, I would send to her to see if she was able to give her a call and to keep an eye on her phone

## 2021-01-21 ENCOUNTER — PATIENT MESSAGE (OUTPATIENT)
Dept: FAMILY MEDICINE CLINIC | Facility: CLINIC | Age: 46
End: 2021-01-21

## 2021-01-21 DIAGNOSIS — R10.31 RLQ ABDOMINAL PAIN: ICD-10-CM

## 2021-01-21 DIAGNOSIS — K62.89 RECTAL DISCOMFORT: Primary | ICD-10-CM

## 2021-01-21 NOTE — TELEPHONE ENCOUNTER
Charles Beyer Oz 1/21/2021 10:08 AM EST      ----- Message -----  From: Sheri Major  Sent: 1/21/2021 10:02 AM EST  To: Gh[facility]al Zavala [facility]  Subject: Non-Urgent Medical Question     Hi Dr Jeanmarie Fink,  I have been working with Dr Shawna Sage, my GYN managing a endometrioma on my right ovary that causes LRQ pain, bloating and some rectal prior to ovulation & menstruation  She suggested that I speak to you regarding scheduling a colonoscopy prior to my next visit with her in June just to rule out any underlying colon/rectal issues  Is this something that will require an in-person visit or can you just write the script and refer me to a group who does these procedures? Thanks for your time and guidance    Lynne

## 2021-03-16 ENCOUNTER — CONSULT (OUTPATIENT)
Dept: GASTROENTEROLOGY | Facility: CLINIC | Age: 46
End: 2021-03-16
Payer: COMMERCIAL

## 2021-03-16 VITALS
WEIGHT: 138 LBS | HEIGHT: 64 IN | SYSTOLIC BLOOD PRESSURE: 118 MMHG | DIASTOLIC BLOOD PRESSURE: 66 MMHG | BODY MASS INDEX: 23.56 KG/M2

## 2021-03-16 DIAGNOSIS — K62.89 RECTAL DISCOMFORT: ICD-10-CM

## 2021-03-16 DIAGNOSIS — R10.13 EPIGASTRIC PRESSURE: ICD-10-CM

## 2021-03-16 DIAGNOSIS — R10.31 RLQ ABDOMINAL PAIN: Primary | ICD-10-CM

## 2021-03-16 DIAGNOSIS — R13.10 DYSPHAGIA, UNSPECIFIED TYPE: ICD-10-CM

## 2021-03-16 PROCEDURE — 99204 OFFICE O/P NEW MOD 45 MIN: CPT | Performed by: NURSE PRACTITIONER

## 2021-03-16 NOTE — PROGRESS NOTES
[facility] - Outpatient Consultation  Lynne Riley 39 y o  female MRN: [id number]  Encounter: [id number]    ASSESSMENT AND PLAN:      1  RLQ abdominal pain  2  Rectal discomfort  Intermittent occasions of RLQ pain that is described as cramping, occasionally sharp lasting about 3 days every 2-3 months  Sometimes the events seem to follow her menstrual cycle  There is some concern for endometriosis with a planned exploratory laparoscopy  Recent TV ultrasound showed an enlarged right ovarian cyst which may explain some of the symptoms  However; the patient also experiences occasional rectal pain with defecation and which she describes as rectal "spasms" with the RLQ pain  Started Metamucil 2 months ago which helped with stool consistency, no change in other symptoms  Rare occasions of BRBPR with wiping which were likely hemorrhoidal  Low suspicion for IBD, colonic neoplasm however; there are some genetic variants for breast cancer that confer a higher risk of colonic neoplasm  The patient is BRCA negative but otherwise unclear of any other genetic markers related to breast cancer  There may be an element of IBS associated with her symptoms since they seem to improve with dairy elimination, Metamucil  Will check;  - CBC; Future  - Comprehensive metabolic panel; Future  - C-reactive protein; Future  - Sedimentation rate, automated; Future  - advised to continue high-fiber diet, increased fluids, Metamucil supplement  - advised to continue dairy free diet  - consider amitriptyline trial  - arrange colonoscopy-[facility]  (Prefers to complete with EGD same time)    3  Dysphagia, unspecified type  4  Epigastric pressure   At least 1 year of solid food dysphagia 1-2 times per week relieved if she drinks some water  No trouble with liquids  Notes postprandial throat clearing which may represent reflux  Cannot exclude stricture, ring, esophagitis, EOE, PUD, less likely celiac  There may be an additional element of functional dyspepsia as sometimes the epigastric pressure worsens with stress  will check;  - CBC; Future  - Comprehensive metabolic panel; Future  - Celiac Disease Antibody Profile; Future  -  Reviewed/advised to continue reflux diet, lifestyle modifications  - reviewed/advised swallowing safety  - consider acid suppression medication (the patient would prefer to wait until EGD results to consider a daily medication)  -  Consider amitriptyline trial  - schedule EGD -[facility] with possible dilation (prefers to complete at the same time as colonoscopy)    Followup Appointment: 6 Weeks  ______________________________________________________________________    Chief Complaint   Patient presents with    Abdominal Pain     LRQ refered by pcp       HPI:   Sheila Joe is a 39 y o   female who presents with several GI complaints, the request her PCP, Dr Jessee Biggs  She has been experiencing episodic RLQ pain that is sharp, occasionally crampy lasting about 3 days and occurring every 2-3 months  The symptoms often coincide with her menstrual cycle and she has had extensive evaluation by gyn  There is a plan for exploratory laparoscopy to evaluate for endometriosis  Generally experiences 2-3 formed stools daily  Stools can be "soft" at times but this variation seems to coincide with different parts of her menstrual cycle  Endorses intermittent rectal spasms and pain with a cramping sensation that often occurs around defecation  She thinks that stress, anxiety may also worsen these symptoms  She started taking Metamucil daily  and eliminated dairy from her diet 2 months ago which definitely has helped reduce symptom frequency, intensity  The patient did have breast cancer with left mastectomy in 2019 but so for there has been no evidence for recurrent disease  Denies melena, hematochezia    On rare occasions she will note BRBPR with wiping only which she attributes to hemorrhoids  There have been no occasions since she started the Metamucil  Appetite is normal   Weight is stable  The patient does endorse solid food dysphagia for about 1 year, occurring 1-2 times per week  Episodes can occur with any solid food and will clear if she drinks some water  Denies any odynophagia, early satiety  Does note rare occasions of liquid reflux that seem to occur after meals  As well as some epigastric pressure  She has also noted postprandial throat clearing which persists for some time after eating  Denies any chest pain or overt heartburn  No improvement in the symptoms with dairy free diet  She thinks that the epigastric pressure may sometimes worsened if she is anxious or stressed  No nausea or vomiting      Historical Information   Past Medical History:   Diagnosis Date    Anemia     slight    Anesthesia     "after deviated septum surgery area just behind front teeth(incisive papilla) became very inflamed and sore/required prednisone"    Anesthesia complication     teeth chattering so much pt chipped tooth    Anxiety     Balance problem     "when experiencing dizziness" "from vertigo"    BRCA2 negative     Breast cancer (HCC)     Deformity of reconstructed breast     left breast "some pockets"    Dental crown present     Dental crown present     Difficulty swallowing     "at times"    Dizzy spells     "working with PCP"    Ear problem     "damage noted unknown cause, Right"    Fatigue     GERD (gastroesophageal reflux disease)     Limb alert care status     No BP/IV left side    Motion sickness     Nausea     "with dizziness"    Night sweat     occ    Palpitations     PONV (postoperative nausea and vomiting)     "had scop patch last surgery was helpful"    Seasonal allergies     Shortness of breath     "randomly"    Tinnitus     occas     Past Surgical History:   Procedure Laterality Date    AUGMENTATION BREAST Right 12/18/2019    Procedure: AUGMENTATION BREAST;  Surgeon: King Payton MD;  [facility];  Service: Plastics    AUGMENTATION MAMMAPLASTY Bilateral 12/18/2019    BREAST BIOPSY Bilateral 02/20/2019    rt- fibroadenoma, lt- br ca    BREAST RECONSTRUCTION Left 12/18/2019    Procedure: RECONSTRUCTION BREAST W/ IMPLANT;  Surgeon: King Payton MD;  [facility];  Service: Plastics    CAPSULOTOMY Left 12/18/2019    Procedure: CAPSULECTOMY;  Surgeon: King Payton MD;  [facility];  Service: Plastics    COLPOSCOPY  2000    INSERTION OF BREAST TISSUE EXPANDER Left 5/17/2019    Procedure: INSERTION/PLACEMENT TISSUE EXPANDER (EXCHANGE);   Surgeon: King Payton MD;  [facility];  Service: Plastics    LEG SURGERY Left     cyst removed from leg    LIPOSUCTION W/ FAT INJECTION Bilateral 8/19/2020    Procedure: MOUND FAT GRAFTING;  Surgeon: King Payton MD;  [facility];  Service: Plastics    MASTECTOMY Left 05/17/2019    MASTECTOMY W/ SENTINEL NODE BIOPSY Left 5/17/2019    Procedure: MASTECTOMY WITH BIOPSY LYMPH NODE SENTINEL; NUC MED 0730;  Surgeon: Riki Luu MD;  [facility];  Service: Surgical Oncology    NC IMPLNT BIO IMPLNT FOR SOFT TISSUE REINFORCEMENT Left 5/17/2019    Procedure: RECONSTRUCTION BREAST W/ IMPLANT;  Surgeon: King Payton MD;  [facility];  Service: Plastics    NC REVISION OF RECONSTRUCTED BREAST Bilateral 8/19/2020    Procedure: BREAST MOUND REVISION WITH;  Surgeon: King Payton MD;  [facility];  Service: Plastics    SINUS SURGERY      US GUIDANCE BREAST BIOPSY RIGHT EACH ADDITIONAL Right 2/20/2019    US GUIDED BREAST BIOPSY LEFT COMPLETE Left 2/20/2019    WISDOM TOOTH EXTRACTION      WISDOM TOOTH EXTRACTION       Social History     Substance and Sexual Activity   Alcohol Use Yes    Frequency: 2-3 times a week    Drinks per session: 1 or 2    Binge frequency: Never    Comment: wine     Social History     Substance and Sexual Activity   Drug Use No Social History     Tobacco Use   Smoking Status Former Smoker    Years: 7 00    Quit date:     Years since quittin 2   Smokeless Tobacco Never Used   Tobacco Comment    was a casual former smoker      Family History   Problem Relation Age of Onset    Melanoma Maternal Grandfather 79    Leukemia Maternal Grandmother 80    Glaucoma Other     No Known Problems Mother     No Known Problems Father     Rectal cancer Maternal Aunt     No Known Problems Sister     No Known Problems Brother     No Known Problems Sister     No Known Problems Sister     Colon cancer Neg Hx     Colon polyps Neg Hx        Meds/Allergies     Current Outpatient Medications:     ibuprofen (MOTRIN) 200 mg tablet    Multiple Vitamin (MULTIVITAMIN) capsule    BIOTIN PO    diazepam (VALIUM) 5 mg tablet    No Known Allergies    PHYSICAL EXAM:    Blood pressure 118/66, height 5' 4" (1 626 m), weight 62 6 kg (138 lb), not currently breastfeeding  Body mass index is 23 69 kg/m²  General Appearance: NAD, cooperative, alert  Head:  Normocephalic, atraumatic  Eyes: Anicteric, PERRLA, EOMI  ENT:  Normal external appearance, normal mucosa  Neck:  Supple, symmetrical, trachea midline,   Resp:  Clear to auscultation bilaterally; no rales, rhonchi or wheezing; respirations unlabored   CV:  S1 S2, Regular rate and rhythm; no murmur, rub, or gallop  GI:  Soft, mild RLQ point tenderness and LLQ tenderness with palpation without rebound or guarding, non-distended; normal bowel sounds; no masses, no organomegaly   Rectal: Deferred  Musculoskeletal: No cyanosis, clubbing or edema  Normal ROM  Skin:  No jaundice, rashes, or lesions   Heme/Lymph: No palpable cervical lymphadenopathy  Psych: Normal affect, good eye contact  Neuro: No gross deficits, AAOx3    Lab Results: Reviewed lab results from        Lab Results   Component Value Date    WBC 5 03 2020    HGB 11 4 (L) 2020    HCT 35 2 2020    MCV 88 08/17/2020     08/17/2020     Lab Results   Component Value Date    K 4 2 08/17/2020     08/17/2020    CO2 27 08/17/2020    BUN 14 08/17/2020    CREATININE 0 73 08/17/2020    GLUF 106 (H) 02/25/2019    CALCIUM 8 9 08/17/2020    AST 16 05/06/2019    ALT 20 05/06/2019    ALKPHOS 49 05/06/2019    EGFR 102 08/17/2020     No results found for: IRON, TIBC, FERRITIN  Lab Results   Component Value Date    LIPASE 123 03/16/2016       Radiology Results:   Reviewed abdominal and TV US results from July and December, 2020  REVIEW OF SYSTEMS:    CONSTITUTIONAL: Denies any fever, chills, rigors, fatigue, weight loss  HEENT: No earache or tinnitus  Denies hearing loss or visual disturbances  CARDIOVASCULAR: No chest pain or palpitations  RESPIRATORY: Denies any cough, hemoptysis, shortness of breath or dyspnea on exertion  GASTROINTESTINAL: As noted in the History of Present Illness  GENITOURINARY: No problems with urination  Denies any hematuria or dysuria  NEUROLOGIC: No dizziness or vertigo, denies headaches  MUSCULOSKELETAL: Denies any muscle or joint pain  SKIN: Denies skin rashes or itching  ENDOCRINE: Denies excessive thirst  Denies intolerance to heat or cold  PSYCHOSOCIAL: Denies depression or anxiety  Denies any recent memory loss

## 2021-03-16 NOTE — PATIENT INSTRUCTIONS
Continue your dairy free diet  High-fiber diet and make sure you're drinking plenty of fluids/ water every day  continue your daily Metamucil   cut food into small pieces, take small bites, chew thoroughly, eat slowly   try sipping some room temperature water before you eat or take pills to moisten your throat   try alternating bites of food with sips of liquid  Continue to minimize fatty/fried foods, chocolate, caffeine, alcohol, NSAIDs ( ibuprofen /Motrin / Advil, Aleve /naproxen, full-dose aspirin)  Recommend smaller more frequent meals and avoid late-night eating ( within 2-3 hours of bed)  Consider elevating the head of your bed at night with blocks or a mattress wedge  You will be scheduled for an upper scope and colonoscopy here at our endoscopy center      High Fiber Diet   WHAT YOU NEED TO KNOW:   What is a high-fiber diet? A high-fiber diet includes foods that have a high amount of fiber  Fiber is the part of fruits, vegetables, and grains that is not broken down by your body  Fiber keeps your bowel movements regular  Fiber can also help lower your cholesterol level, control blood sugar in people with diabetes, and relieve constipation  Fiber can also help you control your weight because it helps you feel full faster  Most adults should eat 25 to 35 grams of fiber each day  Talk to your dietitian or healthcare provider about the amount of fiber you need  What foods are good sources of fiber? · Foods with at least 4 grams of fiber per serving:      ? ? to ½ cup of high-fiber cereal (check the nutrition label on the box)    ? ½ cup of blackberries or raspberries    ? 4 dried prunes    ? 1 cooked artichoke    ? ½ cup of cooked legumes, such as lentils, or red, kidney, and castro beans    · Foods with 1 to 3 grams of fiber per serving:      ? 1 slice of whole-wheat, pumpernickel, or rye bread    ? ½ cup of cooked brown rice    ? 4 whole-wheat crackers    ?  1 cup of oatmeal    ? ½ cup of cereal with 1 to 3 grams of fiber per serving (check the nutrition label on the box)    ? 1 small piece of fruit, such as an apple, banana, pear, kiwi, or orange    ? 3 dates    ? ½ cup of canned apricots, fruit cocktail, peaches, or pears    ? ½ cup of raw or cooked vegetables, such as carrots, cauliflower, cabbage, spinach, squash, or corn  What are some ways that I can increase fiber in my diet? · Choose brown or wild rice instead of white rice  · Use whole wheat flour in recipes instead of white or all-purpose flour  · Add beans and peas to casseroles or soups  · Choose fresh fruit and vegetables with peels or skins on instead of juices  What other guidelines should I follow? · Add fiber to your diet slowly  You may have abdominal discomfort, bloating, and gas if you add fiber to your diet too quickly  · Drink plenty of liquids as you add fiber to your diet  You may have nausea or develop constipation if you do not drink enough water  Ask how much liquid to drink each day and which liquids are best for you  CARE AGREEMENT:   You have the right to help plan your care  Discuss treatment options with your healthcare provider to decide what care you want to receive  You always have the right to refuse treatment  The above information is an  only  It is not intended as medical advice for individual conditions or treatments  Talk to your doctor, nurse or pharmacist before following any medical regimen to see if it is safe and effective for you  © Copyright [address] Information is for End User's use only and may not be sold, redistributed or otherwise used for commercial purposes   All illustrations and images included in CareNotes® are the copyrighted property of A D A M , Inc  or [facility]

## 2021-03-16 NOTE — LETTER
March 16, 2021     MD [phone] [id number]    Patient: Sheila Joe   YOB: 1975   Date of Visit: 3/16/2021       Dear Dr Radha Chavez:    Thank you for referring Garcia Heredia to me for evaluation  Below are my notes for this consultation  If you have questions, please do not hesitate to call me  I look forward to following your patient along with you  Sincerely,        RIAAN Simmons        CC: No Recipients  RIANA Simmons  3/16/2021  6:01 PM  Sign when Signing Visit    [id number] - Outpatient Consultation  Sheila Joe 39 y o  female MRN: [id number]  Encounter: [id number]    ASSESSMENT AND PLAN:      1  RLQ abdominal pain  2  Rectal discomfort  Intermittent occasions of RLQ pain that is described as cramping, occasionally sharp lasting about 3 days every 2-3 months  Sometimes the events seem to follow her menstrual cycle  There is some concern for endometriosis with a planned exploratory laparoscopy  Recent TV ultrasound showed an enlarged right ovarian cyst which may explain some of the symptoms  However; the patient also experiences occasional rectal pain with defecation and which she describes as rectal "spasms" with the RLQ pain  Started Metamucil 2 months ago which helped with stool consistency, no change in other symptoms  Rare occasions of BRBPR with wiping which were likely hemorrhoidal  Low suspicion for IBD, colonic neoplasm however; there are some genetic variants for breast cancer that confer a higher risk of colonic neoplasm  The patient is BRCA negative but otherwise unclear of any other genetic markers related to breast cancer  There may be an element of IBS associated with her symptoms since they seem to improve with dairy elimination, Metamucil  Will check;  - CBC; Future  - Comprehensive metabolic panel; Future  - C-reactive protein;  Future  - Sedimentation rate, automated; Future  - advised to continue high-fiber diet, increased fluids, Metamucil supplement  - advised to continue dairy free diet  - consider amitriptyline trial  - arrange colonoscopy-[facility]  (Prefers to complete with EGD same time)    3  Dysphagia, unspecified type  4  Epigastric pressure   At least 1 year of solid food dysphagia 1-2 times per week relieved if she drinks some water  No trouble with liquids  Notes postprandial throat clearing which may represent reflux  Cannot exclude stricture, ring, esophagitis, EOE, PUD, less likely celiac  There may be an additional element of functional dyspepsia as sometimes the epigastric pressure worsens with stress  will check;  - CBC; Future  - Comprehensive metabolic panel; Future  - Celiac Disease Antibody Profile; Future  -  Reviewed/advised to continue reflux diet, lifestyle modifications  - reviewed/advised swallowing safety  - consider acid suppression medication (the patient would prefer to wait until EGD results to consider a daily medication)  -  Consider amitriptyline trial  - schedule EGD -[facility] with possible dilation (prefers to complete at the same time as colonoscopy)    Followup Appointment: 6 Weeks  ______________________________________________________________________    Chief Complaint   Patient presents with    Abdominal Pain     LRQ refered by pcp       HPI:   Jesús Patel is a 39 y o   female who presents with several GI complaints, the request her PCP, Dr La Murry  She has been experiencing episodic RLQ pain that is sharp, occasionally crampy lasting about 3 days and occurring every 2-3 months  The symptoms often coincide with her menstrual cycle and she has had extensive evaluation by gyn  There is a plan for exploratory laparoscopy to evaluate for endometriosis  Generally experiences 2-3 formed stools daily    Stools can be "soft" at times but this variation seems to coincide with different parts of her menstrual cycle  Endorses intermittent rectal spasms and pain with a cramping sensation that often occurs around defecation  She thinks that stress, anxiety may also worsen these symptoms  She started taking Metamucil daily  and eliminated dairy from her diet 2 months ago which definitely has helped reduce symptom frequency, intensity  The patient did have breast cancer with left mastectomy in 2019 but so for there has been no evidence for recurrent disease  Denies melena, hematochezia  On rare occasions she will note BRBPR with wiping only which she attributes to hemorrhoids  There have been no occasions since she started the Metamucil  Appetite is normal   Weight is stable  The patient does endorse solid food dysphagia for about 1 year, occurring 1-2 times per week  Episodes can occur with any solid food and will clear if she drinks some water  Denies any odynophagia, early satiety  Does note rare occasions of liquid reflux that seem to occur after meals  As well as some epigastric pressure  She has also noted postprandial throat clearing which persists for some time after eating  Denies any chest pain or overt heartburn  No improvement in the symptoms with dairy free diet  She thinks that the epigastric pressure may sometimes worsened if she is anxious or stressed  No nausea or vomiting      Historical Information   Past Medical History:   Diagnosis Date    Anemia     slight    Anesthesia     "after deviated septum surgery area just behind front teeth(incisive papilla) became very inflamed and sore/required prednisone"    Anesthesia complication     teeth chattering so much pt chipped tooth    Anxiety     Balance problem     "when experiencing dizziness" "from vertigo"    BRCA2 negative     Breast cancer (HCC)     Deformity of reconstructed breast     left breast "some pockets"    Dental crown present     Dental crown present     Difficulty swallowing     "at times"    Dizzy spells "working with PCP"    Ear problem     "damage noted unknown cause, Right"    Fatigue     GERD (gastroesophageal reflux disease)     Limb alert care status     No BP/IV left side    Motion sickness     Nausea     "with dizziness"    Night sweat     occ    Palpitations     PONV (postoperative nausea and vomiting)     "had scop patch last surgery was helpful"    Seasonal allergies     Shortness of breath     "randomly"    Tinnitus     occas     Past Surgical History:   Procedure Laterality Date    AUGMENTATION BREAST Right 12/18/2019    Procedure: AUGMENTATION BREAST;  Surgeon: Pawel Muñiz MD;  [facility];  Service: Plastics    AUGMENTATION MAMMAPLASTY Bilateral 12/18/2019    BREAST BIOPSY Bilateral 02/20/2019    rt- fibroadenoma, lt- br ca    BREAST RECONSTRUCTION Left 12/18/2019    Procedure: RECONSTRUCTION BREAST W/ IMPLANT;  Surgeon: Pawel Muñiz MD;  [facility];  Service: Plastics    CAPSULOTOMY Left 12/18/2019    Procedure: CAPSULECTOMY;  Surgeon: Pawel Muñiz MD;  [facility];  Service: Plastics    COLPOSCOPY  2000    INSERTION OF BREAST TISSUE EXPANDER Left 5/17/2019    Procedure: INSERTION/PLACEMENT TISSUE EXPANDER (EXCHANGE);   Surgeon: Pawel Muñiz MD;  [facility];  Service: Plastics    LEG SURGERY Left     cyst removed from leg    LIPOSUCTION W/ FAT INJECTION Bilateral 8/19/2020    Procedure: MOUND FAT GRAFTING;  Surgeon: Pawel Muñiz MD;  [facility];  Service: Plastics    MASTECTOMY Left 05/17/2019    MASTECTOMY W/ SENTINEL NODE BIOPSY Left 5/17/2019    Procedure: MASTECTOMY WITH BIOPSY LYMPH NODE SENTINEL; NUC MED 0730;  Surgeon: Kiel Jacob MD;  [facility];  Service: Surgical Oncology    GA IMPLNT BIO IMPLNT FOR SOFT TISSUE REINFORCEMENT Left 5/17/2019    Procedure: RECONSTRUCTION BREAST W/ IMPLANT;  Surgeon: Pawel Muñiz MD;  [facility];  Service: Plastics    GA REVISION OF RECONSTRUCTED BREAST Bilateral 2020    Procedure: BREAST MOUND REVISION WITH;  Surgeon: Pawel Muñiz MD;  [facility];  Service: Plastics    SINUS SURGERY      US GUIDANCE BREAST BIOPSY RIGHT EACH ADDITIONAL Right 2019    US GUIDED BREAST BIOPSY LEFT COMPLETE Left 2019    WISDOM TOOTH EXTRACTION      WISDOM TOOTH EXTRACTION       Social History     Substance and Sexual Activity   Alcohol Use Yes    Frequency: 2-3 times a week    Drinks per session: 1 or 2    Binge frequency: Never    Comment: wine     Social History     Substance and Sexual Activity   Drug Use No     Social History     Tobacco Use   Smoking Status Former Smoker    Years:     Quit date:     Years since quittin 2   Smokeless Tobacco Never Used   Tobacco Comment    was a casual former smoker      Family History   Problem Relation Age of Onset    Melanoma Maternal Grandfather 79    Leukemia Maternal Grandmother 80    Glaucoma Other     No Known Problems Mother     No Known Problems Father     Rectal cancer Maternal Aunt     No Known Problems Sister     No Known Problems Brother     No Known Problems Sister     No Known Problems Sister     Colon cancer Neg Hx     Colon polyps Neg Hx        Meds/Allergies     Current Outpatient Medications:     ibuprofen (MOTRIN) 200 mg tablet    Multiple Vitamin (MULTIVITAMIN) capsule    BIOTIN PO    diazepam (VALIUM) 5 mg tablet    No Known Allergies    PHYSICAL EXAM:    Blood pressure 118/66, height 5' 4" (1 626 m), weight 62 6 kg (138 lb), not currently breastfeeding  Body mass index is 23 69 kg/m²  General Appearance: NAD, cooperative, alert  Head:  Normocephalic, atraumatic  Eyes: Anicteric, PERRLA, EOMI  ENT:  Normal external appearance, normal mucosa  Neck:  Supple, symmetrical, trachea midline,   Resp:  Clear to auscultation bilaterally; no rales, rhonchi or wheezing; respirations unlabored   CV:  S1 S2, Regular rate and rhythm; no murmur, rub, or gallop    GI: Soft, mild RLQ point tenderness and LLQ tenderness with palpation without rebound or guarding, non-distended; normal bowel sounds; no masses, no organomegaly   Rectal: Deferred  Musculoskeletal: No cyanosis, clubbing or edema  Normal ROM  Skin:  No jaundice, rashes, or lesions   Heme/Lymph: No palpable cervical lymphadenopathy  Psych: Normal affect, good eye contact  Neuro: No gross deficits, AAOx3    Lab Results: Reviewed lab results from August, 2020  Lab Results   Component Value Date    WBC 5 03 08/17/2020    HGB 11 4 (L) 08/17/2020    HCT 35 2 08/17/2020    MCV 88 08/17/2020     08/17/2020     Lab Results   Component Value Date    K 4 2 08/17/2020     08/17/2020    CO2 27 08/17/2020    BUN 14 08/17/2020    CREATININE 0 73 08/17/2020    GLUF 106 (H) 02/25/2019    CALCIUM 8 9 08/17/2020    AST 16 05/06/2019    ALT 20 05/06/2019    ALKPHOS 49 05/06/2019    EGFR 102 08/17/2020     No results found for: IRON, TIBC, FERRITIN  Lab Results   Component Value Date    LIPASE 123 03/16/2016       Radiology Results:   Reviewed abdominal and TV US results from July and December, 2020  REVIEW OF SYSTEMS:    CONSTITUTIONAL: Denies any fever, chills, rigors, fatigue, weight loss  HEENT: No earache or tinnitus  Denies hearing loss or visual disturbances  CARDIOVASCULAR: No chest pain or palpitations  RESPIRATORY: Denies any cough, hemoptysis, shortness of breath or dyspnea on exertion  GASTROINTESTINAL: As noted in the History of Present Illness  GENITOURINARY: No problems with urination  Denies any hematuria or dysuria  NEUROLOGIC: No dizziness or vertigo, denies headaches  MUSCULOSKELETAL: Denies any muscle or joint pain  SKIN: Denies skin rashes or itching  ENDOCRINE: Denies excessive thirst  Denies intolerance to heat or cold  PSYCHOSOCIAL: Denies depression or anxiety  Denies any recent memory loss

## 2021-03-16 NOTE — H&P (VIEW-ONLY)
[id number] - Outpatient Consultation  Lynne Riley 39 y o  female MRN: [id number]  Encounter: [id number]    ASSESSMENT AND PLAN:      1  RLQ abdominal pain  2  Rectal discomfort  Intermittent occasions of RLQ pain that is described as cramping, occasionally sharp lasting about 3 days every 2-3 months  Sometimes the events seem to follow her menstrual cycle  There is some concern for endometriosis with a planned exploratory laparoscopy  Recent TV ultrasound showed an enlarged right ovarian cyst which may explain some of the symptoms  However; the patient also experiences occasional rectal pain with defecation and which she describes as rectal "spasms" with the RLQ pain  Started Metamucil 2 months ago which helped with stool consistency, no change in other symptoms  Rare occasions of BRBPR with wiping which were likely hemorrhoidal  Low suspicion for IBD, colonic neoplasm however; there are some genetic variants for breast cancer that confer a higher risk of colonic neoplasm  The patient is BRCA negative but otherwise unclear of any other genetic markers related to breast cancer  There may be an element of IBS associated with her symptoms since they seem to improve with dairy elimination, Metamucil  Will check;  - CBC; Future  - Comprehensive metabolic panel; Future  - C-reactive protein; Future  - Sedimentation rate, automated; Future  - advised to continue high-fiber diet, increased fluids, Metamucil supplement  - advised to continue dairy free diet  - consider amitriptyline trial  - arrange colonoscopy-[facility]  (Prefers to complete with EGD same time)    3  Dysphagia, unspecified type  4  Epigastric pressure   At least 1 year of solid food dysphagia 1-2 times per week relieved if she drinks some water  No trouble with liquids  Notes postprandial throat clearing which may represent reflux  Cannot exclude stricture, ring, esophagitis, EOE, PUD, less likely celiac  There may be an additional element of functional dyspepsia as sometimes the epigastric pressure worsens with stress  will check;  - CBC; Future  - Comprehensive metabolic panel; Future  - Celiac Disease Antibody Profile; Future  -  Reviewed/advised to continue reflux diet, lifestyle modifications  - reviewed/advised swallowing safety  - consider acid suppression medication (the patient would prefer to wait until EGD results to consider a daily medication)  -  Consider amitriptyline trial  - schedule EGD -[facility] with possible dilation (prefers to complete at the same time as colonoscopy)    Followup Appointment: 6 Weeks  ______________________________________________________________________    Chief Complaint   Patient presents with    Abdominal Pain     LRQ refered by pcp       HPI:   Kendra Rock is a 39 y o   female who presents with several GI complaints, the request her PCP, Dr Osmar Vera  She has been experiencing episodic RLQ pain that is sharp, occasionally crampy lasting about 3 days and occurring every 2-3 months  The symptoms often coincide with her menstrual cycle and she has had extensive evaluation by gyn  There is a plan for exploratory laparoscopy to evaluate for endometriosis  Generally experiences 2-3 formed stools daily  Stools can be "soft" at times but this variation seems to coincide with different parts of her menstrual cycle  Endorses intermittent rectal spasms and pain with a cramping sensation that often occurs around defecation  She thinks that stress, anxiety may also worsen these symptoms  She started taking Metamucil daily  and eliminated dairy from her diet 2 months ago which definitely has helped reduce symptom frequency, intensity  The patient did have breast cancer with left mastectomy in 2019 but so for there has been no evidence for recurrent disease  Denies melena, hematochezia    On rare occasions she will note BRBPR with wiping only which she attributes to hemorrhoids  There have been no occasions since she started the Metamucil  Appetite is normal   Weight is stable  The patient does endorse solid food dysphagia for about 1 year, occurring 1-2 times per week  Episodes can occur with any solid food and will clear if she drinks some water  Denies any odynophagia, early satiety  Does note rare occasions of liquid reflux that seem to occur after meals  As well as some epigastric pressure  She has also noted postprandial throat clearing which persists for some time after eating  Denies any chest pain or overt heartburn  No improvement in the symptoms with dairy free diet  She thinks that the epigastric pressure may sometimes worsened if she is anxious or stressed  No nausea or vomiting      Historical Information   Past Medical History:   Diagnosis Date    Anemia     slight    Anesthesia     "after deviated septum surgery area just behind front teeth(incisive papilla) became very inflamed and sore/required prednisone"    Anesthesia complication     teeth chattering so much pt chipped tooth    Anxiety     Balance problem     "when experiencing dizziness" "from vertigo"    BRCA2 negative     Breast cancer (HCC)     Deformity of reconstructed breast     left breast "some pockets"    Dental crown present     Dental crown present     Difficulty swallowing     "at times"    Dizzy spells     "working with PCP"    Ear problem     "damage noted unknown cause, Right"    Fatigue     GERD (gastroesophageal reflux disease)     Limb alert care status     No BP/IV left side    Motion sickness     Nausea     "with dizziness"    Night sweat     occ    Palpitations     PONV (postoperative nausea and vomiting)     "had scop patch last surgery was helpful"    Seasonal allergies     Shortness of breath     "randomly"    Tinnitus     occas     Past Surgical History:   Procedure Laterality Date    AUGMENTATION BREAST Right 12/18/2019    Procedure: AUGMENTATION BREAST;  Surgeon: Ryder Sellers MD;  Location: [facility];  Service: Plastics    AUGMENTATION MAMMAPLASTY Bilateral 12/18/2019    BREAST BIOPSY Bilateral 02/20/2019    rt- fibroadenoma, lt- br ca    BREAST RECONSTRUCTION Left 12/18/2019    Procedure: RECONSTRUCTION BREAST W/ IMPLANT;  Surgeon: Ryder Sellers MD;  [facility];  Service: Plastics    CAPSULOTOMY Left 12/18/2019    Procedure: CAPSULECTOMY;  Surgeon: Ryder Sellers MD;  [facility];  Service: Plastics    COLPOSCOPY  2000    INSERTION OF BREAST TISSUE EXPANDER Left 5/17/2019    Procedure: INSERTION/PLACEMENT TISSUE EXPANDER (EXCHANGE);   Surgeon: Ryder Sellers MD;  [facility];  Service: Plastics    LEG SURGERY Left     cyst removed from leg    LIPOSUCTION W/ FAT INJECTION Bilateral 8/19/2020    Procedure: MOUND FAT GRAFTING;  Surgeon: Ryder Sellers MD;  [facility];  Service: Plastics    MASTECTOMY Left 05/17/2019    MASTECTOMY W/ SENTINEL NODE BIOPSY Left 5/17/2019    Procedure: MASTECTOMY WITH BIOPSY LYMPH NODE SENTINEL; NUC MED 0730;  Surgeon: Francesca Calix MD;  [facility];  Service: Surgical Oncology    ID IMPLNT BIO IMPLNT FOR SOFT TISSUE REINFORCEMENT Left 5/17/2019    Procedure: RECONSTRUCTION BREAST W/ IMPLANT;  Surgeon: Ryder Sellers MD;  [facility];  Service: Plastics    ID REVISION OF RECONSTRUCTED BREAST Bilateral 8/19/2020    Procedure: BREAST MOUND REVISION WITH;  Surgeon: Ryder Sellers MD;  [facility];  Service: Plastics    SINUS SURGERY      US GUIDANCE BREAST BIOPSY RIGHT EACH ADDITIONAL Right 2/20/2019    US GUIDED BREAST BIOPSY LEFT COMPLETE Left 2/20/2019    WISDOM TOOTH EXTRACTION      WISDOM TOOTH EXTRACTION       Social History     Substance and Sexual Activity   Alcohol Use Yes    Frequency: 2-3 times a week    Drinks per session: 1 or 2    Binge frequency: Never    Comment: wine     Social History     Substance and Sexual Activity   Drug Use No Social History     Tobacco Use   Smoking Status Former Smoker    Years: 7 00    Quit date:     Years since quittin 2   Smokeless Tobacco Never Used   Tobacco Comment    was a casual former smoker      Family History   Problem Relation Age of Onset    Melanoma Maternal Grandfather 79    Leukemia Maternal Grandmother 80    Glaucoma Other     No Known Problems Mother     No Known Problems Father     Rectal cancer Maternal Aunt     No Known Problems Sister     No Known Problems Brother     No Known Problems Sister     No Known Problems Sister     Colon cancer Neg Hx     Colon polyps Neg Hx        Meds/Allergies     Current Outpatient Medications:     ibuprofen (MOTRIN) 200 mg tablet    Multiple Vitamin (MULTIVITAMIN) capsule    BIOTIN PO    diazepam (VALIUM) 5 mg tablet    No Known Allergies    PHYSICAL EXAM:    Blood pressure 118/66, height 5' 4" (1 626 m), weight 62 6 kg (138 lb), not currently breastfeeding  Body mass index is 23 69 kg/m²  General Appearance: NAD, cooperative, alert  Head:  Normocephalic, atraumatic  Eyes: Anicteric, PERRLA, EOMI  ENT:  Normal external appearance, normal mucosa  Neck:  Supple, symmetrical, trachea midline,   Resp:  Clear to auscultation bilaterally; no rales, rhonchi or wheezing; respirations unlabored   CV:  S1 S2, Regular rate and rhythm; no murmur, rub, or gallop  GI:  Soft, mild RLQ point tenderness and LLQ tenderness with palpation without rebound or guarding, non-distended; normal bowel sounds; no masses, no organomegaly   Rectal: Deferred  Musculoskeletal: No cyanosis, clubbing or edema  Normal ROM  Skin:  No jaundice, rashes, or lesions   Heme/Lymph: No palpable cervical lymphadenopathy  Psych: Normal affect, good eye contact  Neuro: No gross deficits, AAOx3    Lab Results: Reviewed lab results from        Lab Results   Component Value Date    WBC 5 03 2020    HGB 11 4 (L) 2020    HCT 35 2 2020    MCV 88 08/17/2020     08/17/2020     Lab Results   Component Value Date    K 4 2 08/17/2020     08/17/2020    CO2 27 08/17/2020    BUN 14 08/17/2020    CREATININE 0 73 08/17/2020    GLUF 106 (H) 02/25/2019    CALCIUM 8 9 08/17/2020    AST 16 05/06/2019    ALT 20 05/06/2019    ALKPHOS 49 05/06/2019    EGFR 102 08/17/2020     No results found for: IRON, TIBC, FERRITIN  Lab Results   Component Value Date    LIPASE 123 03/16/2016       Radiology Results:   Reviewed abdominal and TV US results from July and December, 2020  REVIEW OF SYSTEMS:    CONSTITUTIONAL: Denies any fever, chills, rigors, fatigue, weight loss  HEENT: No earache or tinnitus  Denies hearing loss or visual disturbances  CARDIOVASCULAR: No chest pain or palpitations  RESPIRATORY: Denies any cough, hemoptysis, shortness of breath or dyspnea on exertion  GASTROINTESTINAL: As noted in the History of Present Illness  GENITOURINARY: No problems with urination  Denies any hematuria or dysuria  NEUROLOGIC: No dizziness or vertigo, denies headaches  MUSCULOSKELETAL: Denies any muscle or joint pain  SKIN: Denies skin rashes or itching  ENDOCRINE: Denies excessive thirst  Denies intolerance to heat or cold  PSYCHOSOCIAL: Denies depression or anxiety  Denies any recent memory loss

## 2021-03-16 NOTE — LETTER
March 16, 2021     MD Dennis Boyer [phone]    Patient: Sheri Major   YOB: 1975   Date of Visit: 3/16/2021       Dear Dr Murry Code:    Thank you for referring Laury Burt to me for evaluation  Below are my notes for this consultation  If you have questions, please do not hesitate to call me  I look forward to following your patient along with you  Sincerely,        RIANA Foss        CC: No Recipients  RIANA Foss  3/16/2021  6:01 PM  Incomplete    Darene Due [facility] - Outpatient Consultation  Sheri Major 39 y o  female MRN: [id number]  Encounter: [id number]    ASSESSMENT AND PLAN:      1  RLQ abdominal pain  2  Rectal discomfort  Intermittent occasions of RLQ pain that is described as cramping, occasionally sharp lasting about 3 days every 2-3 months  Sometimes the events seem to follow her menstrual cycle  There is some concern for endometriosis with a planned exploratory laparoscopy  Recent TV ultrasound showed an enlarged right ovarian cyst which may explain some of the symptoms  However; the patient also experiences occasional rectal pain with defecation and which she describes as rectal "spasms" with the RLQ pain  Started Metamucil 2 months ago which helped with stool consistency, no change in other symptoms  Rare occasions of BRBPR with wiping which were likely hemorrhoidal  Low suspicion for IBD, colonic neoplasm however; there are some genetic variants for breast cancer that confer a higher risk of colonic neoplasm  The patient is BRCA negative but otherwise unclear of any other genetic markers related to breast cancer  There may be an element of IBS associated with her symptoms since they seem to improve with dairy elimination, Metamucil  Will check;  - CBC; Future  - Comprehensive metabolic panel; Future  - C-reactive protein;  Future  - Sedimentation rate, automated; Future  - advised to continue high-fiber diet, increased fluids, Metamucil supplement  - advised to continue dairy free diet  - consider amitriptyline trial  - arrange colonoscopy-[facility]  (Prefers to complete with EGD same time)    3  Dysphagia, unspecified type  4  Epigastric pressure   At least 1 year of solid food dysphagia 1-2 times per week relieved if she drinks some water  No trouble with liquids  Notes postprandial throat clearing which may represent reflux  Cannot exclude stricture, ring, esophagitis, EOE, PUD, less likely celiac  There may be an additional element of functional dyspepsia as sometimes the epigastric pressure worsens with stress  will check;  - CBC; Future  - Comprehensive metabolic panel; Future  - Celiac Disease Antibody Profile; Future  -  Reviewed/advised to continue reflux diet, lifestyle modifications  - reviewed/advised swallowing safety  - consider acid suppression medication (the patient would prefer to wait until EGD results to consider a daily medication)  -  Consider amitriptyline trial  - schedule EGD -[facility] with possible dilation (prefers to complete at the same time as colonoscopy)    Followup Appointment: 6 Weeks  ______________________________________________________________________    Chief Complaint   Patient presents with    Abdominal Pain     LRQ refered by pcp       HPI:   Mario Rg is a 39 y o   female who presents with several GI complaints, the request her PCP, Dr Sylvia Camarena  She has been experiencing episodic RLQ pain that is sharp, occasionally crampy lasting about 3 days and occurring every 2-3 months  The symptoms often coincide with her menstrual cycle and she has had extensive evaluation by gyn  There is a plan for exploratory laparoscopy to evaluate for endometriosis  Generally experiences 2-3 formed stools daily  Stools can be "soft" at times but this variation seems to coincide with different parts of her menstrual cycle  Endorses intermittent rectal spasms and pain with a cramping sensation that often occurs around defecation  She thinks that stress, anxiety may also worsen these symptoms  She started taking Metamucil daily  and eliminated dairy from her diet 2 months ago which definitely has helped reduce symptom frequency, intensity  The patient did have breast cancer with left mastectomy in 2019 but so for there has been no evidence for recurrent disease  Denies melena, hematochezia  On rare occasions she will note BRBPR with wiping only which she attributes to hemorrhoids  There have been no occasions since she started the Metamucil  Appetite is normal   Weight is stable  The patient does endorse solid food dysphagia for about 1 year, occurring 1-2 times per week  Episodes can occur with any solid food and will clear if she drinks some water  Denies any odynophagia, early satiety  Does note rare occasions of liquid reflux that seem to occur after meals  As well as some epigastric pressure  She has also noted postprandial throat clearing which persists for some time after eating  Denies any chest pain or overt heartburn  No improvement in the symptoms with dairy free diet  She thinks that the epigastric pressure may sometimes worsened if she is anxious or stressed  No nausea or vomiting      Historical Information   Past Medical History:   Diagnosis Date    Anemia     slight    Anesthesia     "after deviated septum surgery area just behind front teeth(incisive papilla) became very inflamed and sore/required prednisone"    Anesthesia complication     teeth chattering so much pt chipped tooth    Anxiety     Balance problem     "when experiencing dizziness" "from vertigo"    BRCA2 negative     Breast cancer (HCC)     Deformity of reconstructed breast     left breast "some pockets"    Dental crown present     Dental crown present     Difficulty swallowing     "at times"    Dizzy spells     "working with PCP"  Ear problem     "damage noted unknown cause, Right"    Fatigue     GERD (gastroesophageal reflux disease)     Limb alert care status     No BP/IV left side    Motion sickness     Nausea     "with dizziness"    Night sweat     occ    Palpitations     PONV (postoperative nausea and vomiting)     "had scop patch last surgery was helpful"    Seasonal allergies     Shortness of breath     "randomly"    Tinnitus     occas     Past Surgical History:   Procedure Laterality Date    AUGMENTATION BREAST Right 12/18/2019    Procedure: AUGMENTATION BREAST;  Surgeon: Octavio Ospina MD;  [facility];  Service: Plastics    AUGMENTATION MAMMAPLASTY Bilateral 12/18/2019    BREAST BIOPSY Bilateral 02/20/2019    rt- fibroadenoma, lt- br ca    BREAST RECONSTRUCTION Left 12/18/2019    Procedure: RECONSTRUCTION BREAST W/ IMPLANT;  Surgeon: Octavio Ospina MD;  [facility];  Service: Plastics    CAPSULOTOMY Left 12/18/2019    Procedure: CAPSULECTOMY;  Surgeon: Octavio Ospina MD;  [facility];  Service: Plastics    COLPOSCOPY  2000    INSERTION OF BREAST TISSUE EXPANDER Left 5/17/2019    Procedure: INSERTION/PLACEMENT TISSUE EXPANDER (EXCHANGE);   Surgeon: Octavio Ospina MD;  [facility];  Service: Plastics    LEG SURGERY Left     cyst removed from leg    LIPOSUCTION W/ FAT INJECTION Bilateral 8/19/2020    Procedure: MOUND FAT GRAFTING;  Surgeon: Octavio Ospina MD;  [facility];  Service: Plastics    MASTECTOMY Left 05/17/2019    MASTECTOMY W/ SENTINEL NODE BIOPSY Left 5/17/2019    Procedure: MASTECTOMY WITH BIOPSY LYMPH NODE SENTINEL; NUC MED 0730;  Surgeon: Marisol Maddox MD;  [facility];  Service: Surgical Oncology    IN IMPLNT BIO IMPLNT FOR SOFT TISSUE REINFORCEMENT Left 5/17/2019    Procedure: RECONSTRUCTION BREAST W/ IMPLANT;  Surgeon: Octavio Ospina MD;  [facility];  Service: Plastics    IN REVISION OF RECONSTRUCTED BREAST Bilateral 8/19/2020 Procedure: BREAST MOUND REVISION WITH;  Surgeon: Maureen Lindsey MD;  [facility];  Service: Plastics    SINUS SURGERY      US GUIDANCE BREAST BIOPSY RIGHT EACH ADDITIONAL Right 2019    US GUIDED BREAST BIOPSY LEFT COMPLETE Left 2019    WISDOM TOOTH EXTRACTION      WISDOM TOOTH EXTRACTION       Social History     Substance and Sexual Activity   Alcohol Use Yes    Frequency: 2-3 times a week    Drinks per session: 1 or 2    Binge frequency: Never    Comment: wine     Social History     Substance and Sexual Activity   Drug Use No     Social History     Tobacco Use   Smoking Status Former Smoker    Years:     Quit date:     Years since quittin 2   Smokeless Tobacco Never Used   Tobacco Comment    was a casual former smoker      Family History   Problem Relation Age of Onset    Melanoma Maternal Grandfather 79    Leukemia Maternal Grandmother 80    Glaucoma Other     No Known Problems Mother     No Known Problems Father     Rectal cancer Maternal Aunt     No Known Problems Sister     No Known Problems Brother     No Known Problems Sister     No Known Problems Sister     Colon cancer Neg Hx     Colon polyps Neg Hx        Meds/Allergies     Current Outpatient Medications:     ibuprofen (MOTRIN) 200 mg tablet    Multiple Vitamin (MULTIVITAMIN) capsule    BIOTIN PO    diazepam (VALIUM) 5 mg tablet    No Known Allergies    PHYSICAL EXAM:    Blood pressure 118/66, height 5' 4" (1 626 m), weight 62 6 kg (138 lb), not currently breastfeeding  Body mass index is 23 69 kg/m²  General Appearance: NAD, cooperative, alert  Head:  Normocephalic, atraumatic  Eyes: Anicteric, PERRLA, EOMI  ENT:  Normal external appearance, normal mucosa  Neck:  Supple, symmetrical, trachea midline,   Resp:  Clear to auscultation bilaterally; no rales, rhonchi or wheezing; respirations unlabored   CV:  S1 S2, Regular rate and rhythm; no murmur, rub, or gallop    GI:  Soft, mild RLQ point tenderness and LLQ tenderness with palpation without rebound or guarding, non-distended; normal bowel sounds; no masses, no organomegaly   Rectal: Deferred  Musculoskeletal: No cyanosis, clubbing or edema  Normal ROM  Skin:  No jaundice, rashes, or lesions   Heme/Lymph: No palpable cervical lymphadenopathy  Psych: Normal affect, good eye contact  Neuro: No gross deficits, AAOx3    Lab Results: Reviewed lab results from August, 2020  Lab Results   Component Value Date    WBC 5 03 08/17/2020    HGB 11 4 (L) 08/17/2020    HCT 35 2 08/17/2020    MCV 88 08/17/2020     08/17/2020     Lab Results   Component Value Date    K 4 2 08/17/2020     08/17/2020    CO2 27 08/17/2020    BUN 14 08/17/2020    CREATININE 0 73 08/17/2020    GLUF 106 (H) 02/25/2019    CALCIUM 8 9 08/17/2020    AST 16 05/06/2019    ALT 20 05/06/2019    ALKPHOS 49 05/06/2019    EGFR 102 08/17/2020     No results found for: IRON, TIBC, FERRITIN  Lab Results   Component Value Date    LIPASE 123 03/16/2016       Radiology Results:   Reviewed abdominal and TV US results from July and December, 2020  REVIEW OF SYSTEMS:    CONSTITUTIONAL: Denies any fever, chills, rigors, fatigue, weight loss  HEENT: No earache or tinnitus  Denies hearing loss or visual disturbances  CARDIOVASCULAR: No chest pain or palpitations  RESPIRATORY: Denies any cough, hemoptysis, shortness of breath or dyspnea on exertion  GASTROINTESTINAL: As noted in the History of Present Illness  GENITOURINARY: No problems with urination  Denies any hematuria or dysuria  NEUROLOGIC: No dizziness or vertigo, denies headaches  MUSCULOSKELETAL: Denies any muscle or joint pain  SKIN: Denies skin rashes or itching  ENDOCRINE: Denies excessive thirst  Denies intolerance to heat or cold  PSYCHOSOCIAL: Denies depression or anxiety  Denies any recent memory loss       RIANA Dior  3/16/2021  3:28 PM  Incomplete    [email or web address] - Outpatient Consultation  Sheri Mjaor 39 y o  female MRN: [id number]  Encounter: [id number]    ASSESSMENT AND PLAN:      1  RLQ abdominal pain  2  Rectal discomfort  Intermittent occasions of RLQ pain that is described as cramping, occasionally sharp lasting about 3 days every 2-3 months  Sometimes the events seem to follow her menstrual cycle  There is some concern for endometriosis  Recent TV ultrasound showed an enlarged right ovarian cyst   However; the patient also experiences occasional rectal pain with defecation and which she describes as rectal "spasms" with the RLQ pain  Started Metamucil 2 months ago which helped with stool consistency, no change in other symptoms  Rare occasions of BRBPR with wiping which were likely hemorrhoidal     - CBC; Future  - Comprehensive metabolic panel; Future  - C-reactive protein; Future  - Sedimentation rate, automated; Future    3  Dysphagia, unspecified type  4  Epigastric pressure  ***  - CBC; Future  - Comprehensive metabolic panel; Future  - Celiac Disease Antibody Profile; Future  - C-reactive protein; Future  - Sedimentation rate, automated;  Future      Followup Appointment: 6 Weeks  ______________________________________________________________________    Chief Complaint   Patient presents with    Abdominal Pain     LRQ refered by pcp       HPI:   Sheri Major is a 39 y o   female who presents ***    Historical Information   Past Medical History:   Diagnosis Date    Anemia     slight    Anesthesia     "after deviated septum surgery area just behind front teeth(incisive papilla) became very inflamed and sore/required prednisone"    Anesthesia complication     teeth chattering so much pt chipped tooth    Anxiety     Balance problem     "when experiencing dizziness" "from vertigo"    BRCA2 negative     Breast cancer ([address] )     Deformity of reconstructed breast     left breast "some pockets"    Dental crown present     Dental crown present     Difficulty swallowing     "at times"    Dizzy spells     "working with PCP"    Ear problem     "damage noted unknown cause, Right"    Fatigue     GERD (gastroesophageal reflux disease)     Limb alert care status     No BP/IV left side    Motion sickness     Nausea     "with dizziness"    Night sweat     occ    Palpitations     PONV (postoperative nausea and vomiting)     "had scop patch last surgery was helpful"    Seasonal allergies     Shortness of breath     "randomly"    Tinnitus     occas     Past Surgical History:   Procedure Laterality Date    AUGMENTATION BREAST Right 12/18/2019    Procedure: AUGMENTATION BREAST;  Surgeon: Abebe Mathis MD;  [facility];  Service: Plastics    AUGMENTATION MAMMAPLASTY Bilateral 12/18/2019    BREAST BIOPSY Bilateral 02/20/2019    rt- fibroadenoma, lt- br ca    BREAST RECONSTRUCTION Left 12/18/2019    Procedure: RECONSTRUCTION BREAST W/ IMPLANT;  Surgeon: Abebe Mathis MD;  [facility];  Service: Plastics    CAPSULOTOMY Left 12/18/2019    Procedure: CAPSULECTOMY;  Surgeon: Abebe Mathis MD;  [facility];  Service: Plastics    COLPOSCOPY  2000    INSERTION OF BREAST TISSUE EXPANDER Left 5/17/2019    Procedure: INSERTION/PLACEMENT TISSUE EXPANDER (EXCHANGE);   Surgeon: Abebe Mathis MD;  [facility];  Service: Plastics    LEG SURGERY Left     cyst removed from leg    LIPOSUCTION W/ FAT INJECTION Bilateral 8/19/2020    Procedure: MOUND FAT GRAFTING;  Surgeon: Abebe Mathis MD;  [facility];  Service: Plastics    MASTECTOMY Left 05/17/2019    MASTECTOMY W/ SENTINEL NODE BIOPSY Left 5/17/2019    Procedure: MASTECTOMY WITH BIOPSY LYMPH NODE SENTINEL; NUC MED 0730;  Surgeon: Bibi Elizabeth MD;  [facility];  Service: Surgical Oncology    MD IMPLNT BIO IMPLNT FOR SOFT TISSUE REINFORCEMENT Left 5/17/2019    Procedure: RECONSTRUCTION BREAST W/ IMPLANT;  Surgeon: Abebe Mathis MD;  [facility];  Service: Plastics    DC REVISION OF RECONSTRUCTED BREAST Bilateral 2020    Procedure: BREAST MOUND REVISION WITH;  Surgeon: Richard Cantu MD;  [facility];  Service: Plastics    SINUS SURGERY      US GUIDANCE BREAST BIOPSY RIGHT EACH ADDITIONAL Right 2019    US GUIDED BREAST BIOPSY LEFT COMPLETE Left 2019    WISDOM TOOTH EXTRACTION      WISDOM TOOTH EXTRACTION       Social History     Substance and Sexual Activity   Alcohol Use Yes    Frequency: 2-3 times a week    Drinks per session: 1 or 2    Binge frequency: Never    Comment: wine     Social History     Substance and Sexual Activity   Drug Use No     Social History     Tobacco Use   Smoking Status Former Smoker    Years: 7     Quit date:     Years since quittin 2   Smokeless Tobacco Never Used   Tobacco Comment    was a casual former smoker      Family History   Problem Relation Age of Onset    Melanoma Maternal Grandfather 79    Leukemia Maternal Grandmother 80    Glaucoma Other     No Known Problems Mother     No Known Problems Father     Rectal cancer Maternal Aunt     No Known Problems Sister     No Known Problems Brother     No Known Problems Sister     No Known Problems Sister     Colon cancer Neg Hx     Colon polyps Neg Hx        Meds/Allergies     Current Outpatient Medications:     ibuprofen (MOTRIN) 200 mg tablet    Multiple Vitamin (MULTIVITAMIN) capsule    BIOTIN PO    diazepam (VALIUM) 5 mg tablet    No Known Allergies    PHYSICAL EXAM:    Blood pressure 118/66, height 5' 4" (1 626 m), weight 62 6 kg (138 lb), not currently breastfeeding  Body mass index is 23 69 kg/m²  General Appearance: NAD, cooperative, alert  Head:  Normocephalic, atraumatic  Eyes: Anicteric, PERRLA, EOMI  ENT:  Normal external appearance, normal mucosa      Neck:  Supple, symmetrical, trachea midline,   Resp:  Clear to auscultation bilaterally; no rales, rhonchi or wheezing; respirations unlabored   CV:  S1 S2, Regular rate and rhythm; no murmur, rub, or gallop  GI:  Soft, mild RLQ point tenderness and LLQ tenderness with palpation without rebound or guarding, non-distended; normal bowel sounds; no masses, no organomegaly   Rectal: Deferred  Musculoskeletal: No cyanosis, clubbing or edema  Normal ROM  Skin:  No jaundice, rashes, or lesions   Heme/Lymph: No palpable cervical lymphadenopathy  Psych: Normal affect, good eye contact  Neuro: No gross deficits, AAOx3    Lab Results: Reviewed lab results from August, 2020  Lab Results   Component Value Date    WBC 5 03 08/17/2020    HGB 11 4 (L) 08/17/2020    HCT 35 2 08/17/2020    MCV 88 08/17/2020     08/17/2020     Lab Results   Component Value Date    K 4 2 08/17/2020     08/17/2020    CO2 27 08/17/2020    BUN 14 08/17/2020    CREATININE 0 73 08/17/2020    GLUF 106 (H) 02/25/2019    CALCIUM 8 9 08/17/2020    AST 16 05/06/2019    ALT 20 05/06/2019    ALKPHOS 49 05/06/2019    EGFR 102 08/17/2020     No results found for: IRON, TIBC, FERRITIN  Lab Results   Component Value Date    LIPASE 123 03/16/2016       Radiology Results:   Reviewed abdominal and TV US results from July and December, 2020  REVIEW OF SYSTEMS:    CONSTITUTIONAL: Denies any fever, chills, rigors, fatigue, weight loss  HEENT: No earache or tinnitus  Denies hearing loss or visual disturbances  CARDIOVASCULAR: No chest pain or palpitations  RESPIRATORY: Denies any cough, hemoptysis, shortness of breath or dyspnea on exertion  GASTROINTESTINAL: As noted in the History of Present Illness  GENITOURINARY: No problems with urination  Denies any hematuria or dysuria  NEUROLOGIC: No dizziness or vertigo, denies headaches  MUSCULOSKELETAL: Denies any muscle or joint pain  SKIN: Denies skin rashes or itching  ENDOCRINE: Denies excessive thirst  Denies intolerance to heat or cold  PSYCHOSOCIAL: Denies depression or anxiety  Denies any recent memory loss

## 2021-03-31 VITALS — HEIGHT: 64 IN | WEIGHT: 138 LBS | BODY MASS INDEX: 23.56 KG/M2

## 2021-03-31 DIAGNOSIS — R10.31 RLQ ABDOMINAL PAIN: Primary | ICD-10-CM

## 2021-03-31 RX ORDER — SODIUM PICOSULFATE, MAGNESIUM OXIDE, AND ANHYDROUS CITRIC ACID 10; 3.5; 12 MG/160ML; G/160ML; G/160ML
LIQUID ORAL
Qty: 2 BOTTLE | Refills: 0 | Status: SHIPPED | OUTPATIENT
Start: 2021-03-31 | End: 2021-04-07 | Stop reason: HOSPADM

## 2021-03-31 NOTE — TELEPHONE ENCOUNTER
Why does your doctor want you to have this procedure? Dysphagia, RLQ pain    Do you have kidney disease?  no  If yes, are you on dialysis :     Have you had diverticulitis within the past 2 months? no    Are you diabetic?  no  If yes, insulin dependent: If yes, provide diabetic instructions sheet     Do take iron supplements?  no  If yes, instruct patient to hold iron supplement for 7 days prior    Are you on a blood thinner? no  Was the blood thinner sheet complete and faxed to cardiologist no  Plavix (clopidogrel), Coumadin (warfarin), Lovenox (enoxaparin), Xarelto (rivaroxaban), Pradaxa(dabigatran), Eliquis(apixaban) Savaysa/Lixiana (edoxapan)    Do you have an automatic implantable cardiac defibrillator (AICD)/pacemaker ([facility])? no  Was AICD/pacemaker sheet completed and faxed to cardiologist? no    Are you on home oxygen? no  If yes, continuous or nocturnal:     Have you been treated for MRSA, VRE or any communicable diseases? no    Heart attack, stroke, or stent within 3 months? no  Schedule at Hospital if within 3-6 months   Use nitroglycerin for chest pain in the last 6 months? no    History of organ  transplant?  no   If yes, notify Endo      History of neck/throat/tongue surgery or cancer? no  IF yes, notify Endo      Any problems with anesthesia in the past? no     Was stool C diff ordered?  no Stool specimen needs to be completed prior to procedure    Do have any facial or body piercings?no     Do you have a latex allergy? no     Do have an allergy to metals? (Bravo study only) no     If pediatric patient, was consent faxed to pediatrician no     Patient rights reviewed yes   Rx Clenpiq sent to provider for signature  Instructions emailed to patient

## 2021-04-07 ENCOUNTER — HOSPITAL ENCOUNTER (OUTPATIENT)
Dept: GASTROENTEROLOGY | Facility: AMBULATORY SURGERY CENTER | Age: 46
Discharge: HOME | End: 2021-04-07
Payer: COMMERCIAL

## 2021-04-07 ENCOUNTER — ANESTHESIA EVENT (OUTPATIENT)
Dept: GASTROENTEROLOGY | Facility: AMBULATORY SURGERY CENTER | Age: 46
End: 2021-04-07

## 2021-04-07 ENCOUNTER — ANESTHESIA (OUTPATIENT)
Dept: GASTROENTEROLOGY | Facility: AMBULATORY SURGERY CENTER | Age: 46
End: 2021-04-07

## 2021-04-07 VITALS
DIASTOLIC BLOOD PRESSURE: 56 MMHG | RESPIRATION RATE: 18 BRPM | HEART RATE: 77 BPM | OXYGEN SATURATION: 100 % | SYSTOLIC BLOOD PRESSURE: 104 MMHG | TEMPERATURE: 98.2 F

## 2021-04-07 DIAGNOSIS — R10.13 EPIGASTRIC PRESSURE: ICD-10-CM

## 2021-04-07 DIAGNOSIS — K62.89 RECTAL DISCOMFORT: ICD-10-CM

## 2021-04-07 DIAGNOSIS — R13.10 DYSPHAGIA, UNSPECIFIED TYPE: ICD-10-CM

## 2021-04-07 DIAGNOSIS — R10.31 RLQ ABDOMINAL PAIN: ICD-10-CM

## 2021-04-07 LAB
EXT PREGNANCY TEST URINE: NEGATIVE
EXT. CONTROL: NORMAL

## 2021-04-07 PROCEDURE — 88305 TISSUE EXAM BY PATHOLOGIST: CPT | Performed by: PATHOLOGY

## 2021-04-07 PROCEDURE — 43239 EGD BIOPSY SINGLE/MULTIPLE: CPT | Performed by: INTERNAL MEDICINE

## 2021-04-07 PROCEDURE — 45378 DIAGNOSTIC COLONOSCOPY: CPT | Performed by: INTERNAL MEDICINE

## 2021-04-07 PROCEDURE — 43450 DILATE ESOPHAGUS 1/MULT PASS: CPT | Performed by: INTERNAL MEDICINE

## 2021-04-07 RX ORDER — SODIUM CHLORIDE 9 MG/ML
50 INJECTION, SOLUTION INTRAVENOUS CONTINUOUS
Status: DISCONTINUED | OUTPATIENT
Start: 2021-04-07 | End: 2021-04-11 | Stop reason: HOSPADM

## 2021-04-07 RX ORDER — PROPOFOL 10 MG/ML
INJECTION, EMULSION INTRAVENOUS AS NEEDED
Status: DISCONTINUED | OUTPATIENT
Start: 2021-04-07 | End: 2021-04-07

## 2021-04-07 RX ADMIN — PROPOFOL 50 MG: 10 INJECTION, EMULSION INTRAVENOUS at 10:56

## 2021-04-07 RX ADMIN — PROPOFOL 100 MG: 10 INJECTION, EMULSION INTRAVENOUS at 10:40

## 2021-04-07 RX ADMIN — PROPOFOL 50 MG: 10 INJECTION, EMULSION INTRAVENOUS at 10:43

## 2021-04-07 RX ADMIN — PROPOFOL 50 MG: 10 INJECTION, EMULSION INTRAVENOUS at 10:49

## 2021-04-07 RX ADMIN — SODIUM CHLORIDE 50 ML/HR: 9 INJECTION, SOLUTION INTRAVENOUS at 10:03

## 2021-04-07 RX ADMIN — PROPOFOL 50 MG: 10 INJECTION, EMULSION INTRAVENOUS at 10:52

## 2021-04-07 RX ADMIN — PROPOFOL 50 MG: 10 INJECTION, EMULSION INTRAVENOUS at 10:46

## 2021-04-07 RX ADMIN — PROPOFOL 50 MG: 10 INJECTION, EMULSION INTRAVENOUS at 10:59

## 2021-04-07 NOTE — DISCHARGE INSTRUCTIONS
Dang Esophagus   WHAT YOU NEED TO KNOW:   What is Dang esophagus? Dang esophagus is a condition that is also called intestinal metaplasia  Cells that line your esophagus change into cells that are like intestine cells  The change increases your risk for esophageal cancer  What increases my risk for Dang esophagus? The exact cause of Dang esophagus is not known  The following may increase your risk:  · Long-term gastroesophageal reflux disease (GERD)    · Age older than 48    · A family history of GERD, Dang esophagus, or esophageal cancer    · Obesity    · Smoking cigarettes    What are the signs and symptoms of Dang esophagus? Signs and symptoms are usually related to the signs and symptoms of GERD  You may have any of the following:  · Heartburn (burning pain in your chest)    · Pain after meals that spreads to your neck, jaw, or shoulder    · Pain that gets better when you change positions    · Bitter or acid taste in your mouth    · A dry cough    · Trouble swallowing or pain with swallowing    · Hoarseness or a sore throat    · Burping or hiccups    · Feeling full soon after you start eating    How is Dang esophagus diagnosed? An endoscopy is a procedure used to see the inside of your esophagus and stomach  A scope is a long, bendable tube with a light on the end of it  A camera may be hooked to the scope  Your healthcare provider may also take tissue samples to be tested for dysplasia (abnormal changes in your cells and tissues)  If dysplasia is found, it will be given a grade to describe the amount of change  The grade can range from negative (no dysplasia) to high-grade (a large amount)  You have a higher risk for cancer with high-grade dysplasia  How is Dang esophagus treated? Treatment depends on the grade of dysplasia  The goal of treatment is to control your symptoms and prevent esophageal cancer   Your healthcare provider may also suggest that you make changes in the foods you eat and in your lifestyle  You may need any of the following:  · Anti-reflux medicines  help decrease the stomach acid that can irritate your esophagus and stomach  These medicines may include proton pump inhibitors (PPI) and histamine type-2 receptor (H2) blockers  You may also be given medicines to stop vomiting  · Surgery or other procedures  may be done if you have high-grade dysplasia  Abnormal cells may be killed with heat or by freezing them  Light may be used to kill abnormal cells  It is used in combination with medicines that make the abnormal cells sensitive to light  Surgery may be used to wrap the upper part of your stomach around the esophageal sphincter to strengthen it  Surgery may be needed to remove part or all of your esophagus  What can I do to manage Dang esophagus? · Do not eat foods that make your symptoms worse  Examples are chocolate, garlic, onions, spicy or fatty foods, citrus fruits (oranges), and tomato-based foods (spaghetti sauce)  Do not drink alcohol, drinks that contain caffeine, or carbonated drinks, such as soda  Ask your healthcare provider if there are other foods and drinks you should not have  · Maintain a healthy weight  If you are overweight, weight loss may help relieve symptoms  Ask your healthcare provider about safe ways to lose weight  · Do not smoke  If you smoke, it is never too late to quit  Smoking may worsen acid reflux  Ask your healthcare provider for information if you need help quitting  Where can I get support and more information? · [address]  [place]  [facility]  Phone: [phone]  Web Address: http://[employer]/  org    Call your local emergency number (911 in the [address] [place] [address],[address]) if:   · You have severe chest pain and shortness of breath  When should I seek immediate care? · Your bowel movements are black, bloody, or tarry      · Your vomit looks like coffee grounds or has blood in it     When should I call my doctor? · Your symptoms do not improve with treatment  · You have questions or concerns about your condition or care  CARE AGREEMENT:   You have the right to help plan your care  Learn about your health condition and how it may be treated  Discuss treatment options with your healthcare providers to decide what care you want to receive  You always have the right to refuse treatment  The above information is an  only  It is not intended as medical advice for individual conditions or treatments  Talk to your doctor, nurse or pharmacist before following any medical regimen to see if it is safe and effective for you  © Copyright [address] Information is for End User's use only and may not be sold, redistributed or otherwise used for commercial purposes  All illustrations and images included in CareNotes® are the copyrighted property of [employer]  or [place] [facility]   Hemorrhoids   WHAT YOU NEED TO KNOW:   What are hemorrhoids? Hemorrhoids are swollen blood vessels inside your rectum (internal hemorrhoids) or on your anus (external hemorrhoids)  Sometimes a hemorrhoid may prolapse  This means it extends out of your anus  What increases my risk for hemorrhoids? · Pregnancy or obesity    · Straining or sitting for a long time during bowel movements    · Liver disease    · Weak muscles around the anus caused by older age, rectal surgery, or anal intercourse    · A lack of physical activity    · Chronic diarrhea or constipation    · A low-fiber diet    What are the signs and symptoms of hemorrhoids? · Pain or itching around your anus or inside your rectum    · Swelling or bumps around your anus    · Bright red blood in your bowel movement, on the toilet paper, or in the toilet bowl    · Tissue bulging out of your anus (prolapsed hemorrhoids)    · Incontinence (poor control over urine or bowel movements)    How are hemorrhoids diagnosed?   Your healthcare provider will ask about your symptoms, the foods you eat, and your bowel movements  He or she will examine your anus for external hemorrhoids  You may need the following:  · A digital rectal exam  is a test to check for hemorrhoids  Your healthcare provider will put a gloved finger inside your anus to feel for the hemorrhoids  · An anoscopy  is a test that uses a scope (small tube with a light and camera on the end) to look at your hemorrhoids  How are hemorrhoids treated? Treatment will depend on your symptoms  You may need any of the following:  · Medicines  can help decrease pain and swelling, and soften your bowel movement  The medicine may be a pill, pad, cream, or ointment  · Procedures  may be used to shrink or remove your hemorrhoid  Examples include rubber-band ligation, sclerotherapy, and photocoagulation  These procedures may be done in your healthcare provider's office  Ask your healthcare provider for more information about these procedures  · Surgery  may be needed to shrink or remove your hemorrhoids  How can I manage my symptoms? · Apply ice on your anus for 15 to 20 minutes every hour or as directed  Use an ice pack, or put crushed ice in a plastic bag  Cover it with a towel before you apply it to your anus  Ice helps prevent tissue damage and decreases swelling and pain  · Take a sitz bath  Fill a bathtub with 4 to 6 inches of warm water  You may also use a sitz bath pan that fits inside a toilet bowl  Sit in the sitz bath for 15 minutes  Do this 3 times a day, and after each bowel movement  The warm water can help decrease pain and swelling  · Keep your anal area clean  Gently wash the area with warm water daily  Soap may irritate the area  After a bowel movement, wipe with moist towelettes or wet toilet paper  Dry toilet paper can irritate the area  How can I help prevent hemorrhoids? · Do not strain to have a bowel movement    Do not sit on the toilet too long  These actions can increase pressure on the tissues in your rectum and anus  · Drink plenty of liquids  Liquids can help prevent constipation  Ask how much liquid to drink each day and which liquids are best for you  · Eat a variety of high-fiber foods  Examples include fruits, vegetables, and whole grains  Ask your healthcare provider how much fiber you need each day  You may need to take a fiber supplement  · Exercise as directed  Exercise, such as walking, may make it easier to have a bowel movement  Ask your healthcare provider to help you create an exercise plan  · Do not have anal sex  Anal sex can weaken the skin around your rectum and anus  · Avoid heavy lifting  This can cause straining and increase your risk for another hemorrhoid  When should I seek immediate care? · You have severe pain in your rectum or around your anus  · You have severe pain in your abdomen and you are vomiting  · You have bleeding from your anus that soaks through your underwear  When should I contact my healthcare provider? · You have frequent and painful bowel movements  · Your hemorrhoid looks or feels more swollen than usual      · You do not have a bowel movement for 2 days or more  · You see or feel tissue coming through your anus  · You have questions or concerns about your condition or care  CARE AGREEMENT:   You have the right to help plan your care  Learn about your health condition and how it may be treated  Discuss treatment options with your healthcare providers to decide what care you want to receive  You always have the right to refuse treatment  The above information is an  only  It is not intended as medical advice for individual conditions or treatments  Talk to your doctor, nurse or pharmacist before following any medical regimen to see if it is safe and effective for you    © Copyright [employer] 2020 Information is for End User's use only and may not be sold, redistributed or otherwise used for commercial purposes   All illustrations and images included in CareNotes® are the copyrighted property of A D A M , Inc  or Vignesh Curtis

## 2021-04-07 NOTE — INTERVAL H&P NOTE
H&P reviewed  After examining the patient I find no changes in the patients condition since the H&P had been written      Vitals:    04/07/21 0950   BP: 107/72   Pulse: 71   Resp: 15   Temp: 98 2 °F (36 8 °C)   SpO2: 100%

## 2021-04-07 NOTE — ANESTHESIA PREPROCEDURE EVALUATION
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

## 2021-04-12 NOTE — RESULT ENCOUNTER NOTE
No EGD recall  COLON recall 10 years  Called pt  No answer, left VM: Given biopsies with IEL on duodenal bx, proceed to get the blood work ordered from [facility] and follow up as scheduled

## 2021-04-28 ENCOUNTER — LAB (OUTPATIENT)
Dept: LAB | Facility: HOSPITAL | Age: 46
End: 2021-04-28
Payer: COMMERCIAL

## 2021-04-28 DIAGNOSIS — R10.31 RLQ ABDOMINAL PAIN: ICD-10-CM

## 2021-04-28 DIAGNOSIS — R10.13 EPIGASTRIC PRESSURE: ICD-10-CM

## 2021-04-28 DIAGNOSIS — R13.10 DYSPHAGIA, UNSPECIFIED TYPE: ICD-10-CM

## 2021-04-28 DIAGNOSIS — K62.89 RECTAL DISCOMFORT: ICD-10-CM

## 2021-04-28 LAB
ALBUMIN SERPL BCP-MCNC: 4 G/DL (ref 3.5–5)
ALP SERPL-CCNC: 46 U/L (ref 46–116)
ALT SERPL W P-5'-P-CCNC: 17 U/L (ref 12–78)
ANION GAP SERPL CALCULATED.3IONS-SCNC: 3 MMOL/L (ref 4–13)
AST SERPL W P-5'-P-CCNC: 11 U/L (ref 5–45)
BILIRUB SERPL-MCNC: 0.84 MG/DL (ref 0.2–1)
BUN SERPL-MCNC: 16 MG/DL (ref 5–25)
CALCIUM SERPL-MCNC: 9 MG/DL (ref 8.3–10.1)
CHLORIDE SERPL-SCNC: 108 MMOL/L (ref 100–108)
CO2 SERPL-SCNC: 27 MMOL/L (ref 21–32)
CREAT SERPL-MCNC: 0.8 MG/DL (ref 0.6–1.3)
CRP SERPL QL: <3 MG/L
ERYTHROCYTE [DISTWIDTH] IN BLOOD BY AUTOMATED COUNT: 12.3 % (ref 11.6–15.1)
ERYTHROCYTE [SEDIMENTATION RATE] IN BLOOD: 9 MM/HOUR (ref 0–19)
GFR SERPL CREATININE-BSD FRML MDRD: 91 ML/MIN/1.73SQ M
GLUCOSE P FAST SERPL-MCNC: 93 MG/DL (ref 65–99)
HCT VFR BLD AUTO: 36.4 % (ref 34.8–46.1)
HGB BLD-MCNC: 11.7 G/DL (ref 11.5–15.4)
MCH RBC QN AUTO: 28.4 PG (ref 26.8–34.3)
MCHC RBC AUTO-ENTMCNC: 32.1 G/DL (ref 31.4–37.4)
MCV RBC AUTO: 88 FL (ref 82–98)
PLATELET # BLD AUTO: 267 THOUSANDS/UL (ref 149–390)
PMV BLD AUTO: 11.8 FL (ref 8.9–12.7)
POTASSIUM SERPL-SCNC: 4.1 MMOL/L (ref 3.5–5.3)
PROT SERPL-MCNC: 7.2 G/DL (ref 6.4–8.2)
RBC # BLD AUTO: 4.12 MILLION/UL (ref 3.81–5.12)
SODIUM SERPL-SCNC: 138 MMOL/L (ref 136–145)
WBC # BLD AUTO: 5.02 THOUSAND/UL (ref 4.31–10.16)

## 2021-04-28 PROCEDURE — 85027 COMPLETE CBC AUTOMATED: CPT

## 2021-04-28 PROCEDURE — 86140 C-REACTIVE PROTEIN: CPT

## 2021-04-28 PROCEDURE — 82784 ASSAY IGA/IGD/IGG/IGM EACH: CPT

## 2021-04-28 PROCEDURE — 86255 FLUORESCENT ANTIBODY SCREEN: CPT

## 2021-04-28 PROCEDURE — 85652 RBC SED RATE AUTOMATED: CPT

## 2021-04-28 PROCEDURE — 80053 COMPREHEN METABOLIC PANEL: CPT

## 2021-04-28 PROCEDURE — 83516 IMMUNOASSAY NONANTIBODY: CPT

## 2021-04-28 PROCEDURE — 36415 COLL VENOUS BLD VENIPUNCTURE: CPT

## 2021-04-29 LAB
ENDOMYSIUM IGA SER QL: NEGATIVE
GLIADIN PEPTIDE IGA SER-ACNC: 6 UNITS (ref 0–19)
GLIADIN PEPTIDE IGG SER-ACNC: 3 UNITS (ref 0–19)
IGA SERPL-MCNC: 110 MG/DL (ref 87–352)
TTG IGA SER-ACNC: <2 U/ML (ref 0–3)
TTG IGG SER-ACNC: <2 U/ML (ref 0–5)

## 2021-05-19 ENCOUNTER — TELEMEDICINE (OUTPATIENT)
Dept: GASTROENTEROLOGY | Facility: CLINIC | Age: 46
End: 2021-05-19

## 2021-05-19 VITALS — WEIGHT: 130 LBS | HEIGHT: 64 IN | BODY MASS INDEX: 22.2 KG/M2

## 2021-05-19 DIAGNOSIS — Z53.29 NO-SHOW FOR APPOINTMENT: Primary | ICD-10-CM

## 2021-06-10 ENCOUNTER — TELEPHONE (OUTPATIENT)
Dept: OBGYN CLINIC | Facility: CLINIC | Age: 46
End: 2021-06-10

## 2021-06-10 DIAGNOSIS — N83.201 RIGHT OVARIAN CYST: Primary | ICD-10-CM

## 2021-06-10 NOTE — TELEPHONE ENCOUNTER
Pt called in regards to an appointment that we had to reschedule for her annual  Pt stated that she did have an ultrasound done in December and would like to know if she needs a f/u before her appointment   Per previous result note it did state that she will need a 6 month f/u ultrasound to monitor ovarian cyst  Should pt have done prior to visit with dr townsend or wait to get order at time of visit on 7/1 please advise

## 2021-06-10 NOTE — TELEPHONE ENCOUNTER
I assume you would want her u/s prior to appt with you  She had 5 9 cm ovarion (r) cyst 12/20   May I order an u/s, trans vag , for her

## 2021-06-28 ENCOUNTER — HOSPITAL ENCOUNTER (OUTPATIENT)
Dept: ULTRASOUND IMAGING | Facility: HOSPITAL | Age: 46
Discharge: HOME | End: 2021-06-28
Payer: COMMERCIAL

## 2021-06-28 DIAGNOSIS — N83.201 RIGHT OVARIAN CYST: ICD-10-CM

## 2021-06-28 PROCEDURE — 76856 US EXAM PELVIC COMPLETE: CPT

## 2021-06-28 PROCEDURE — 76830 TRANSVAGINAL US NON-OB: CPT

## 2021-06-29 ENCOUNTER — OFFICE VISIT (OUTPATIENT)
Dept: FAMILY MEDICINE CLINIC | Facility: CLINIC | Age: 46
End: 2021-06-29
Payer: COMMERCIAL

## 2021-06-29 VITALS
TEMPERATURE: 97.5 F | OXYGEN SATURATION: 96 % | SYSTOLIC BLOOD PRESSURE: 110 MMHG | HEIGHT: 65 IN | BODY MASS INDEX: 21.91 KG/M2 | DIASTOLIC BLOOD PRESSURE: 68 MMHG | WEIGHT: 131.5 LBS | HEART RATE: 95 BPM

## 2021-06-29 DIAGNOSIS — Z13.220 SCREENING FOR LIPID DISORDERS: ICD-10-CM

## 2021-06-29 DIAGNOSIS — R00.2 PALPITATIONS: ICD-10-CM

## 2021-06-29 DIAGNOSIS — Z13.1 SCREENING FOR DIABETES MELLITUS: ICD-10-CM

## 2021-06-29 DIAGNOSIS — Z11.1 SCREENING FOR TUBERCULOSIS: ICD-10-CM

## 2021-06-29 DIAGNOSIS — Z00.00 ANNUAL PHYSICAL EXAM: Primary | ICD-10-CM

## 2021-06-29 PROCEDURE — 99396 PREV VISIT EST AGE 40-64: CPT | Performed by: FAMILY MEDICINE

## 2021-06-29 NOTE — PROGRESS NOTES
[id number]    NAME: Deng Riley  AGE: 39 y o  SEX: female  : 1975     DATE: 2021     Assessment and Plan:     Problem List Items Addressed This Visit     None      Visit Diagnoses     Annual physical exam    -  Primary    Palpitations        Relevant Orders    Holter monitor - 48 hour    Screening for diabetes mellitus        Relevant Orders    Hemoglobin A1C    Screening for lipid disorders        Relevant Orders    Lipid Panel with Direct LDL reflex    Screening for tuberculosis        Relevant Orders    Quantiferon TB Gold Plus          Immunizations and preventive care screenings were discussed with patient today  Appropriate education was printed on patient's after visit summary  Counseling:  Dental Health: discussed importance of regular tooth brushing, flossing, and dental visits  · Exercise: the importance of regular exercise/physical activity was discussed  Recommend exercise 3-5 times per week for at least 30 minutes  Return in 1 year (on 2022)  Chief Complaint:     Chief Complaint   Patient presents with    Annual Exam     Pt here today for her annual exam        History of Present Illness:     Adult Annual Physical   Patient here for a comprehensive physical exam  The patient reports no problems  Diet and Physical Activity  · Diet/Nutrition: well balanced diet  · Exercise: moderate cardiovascular exercise  Depression Screening  PHQ-9 Depression Screening    PHQ-9:   Frequency of the following problems over the past two weeks:      Little interest or pleasure in doing things: 0 - not at all  Feeling down, depressed, or hopeless: 0 - not at all  PHQ-2 Score: 0       General Health  · Sleep: sleeps well  · Hearing: normal - bilateral   · Vision: no vision problems  · Dental: regular dental visits            Review of Systems:     Review of Systems   Constitutional: Negative for chills, fever and unexpected weight change  HENT: Negative for congestion, ear pain, hearing loss, postnasal drip, rhinorrhea and sore throat  Eyes: Negative for visual disturbance  Respiratory: Negative for cough and shortness of breath  Cardiovascular: Positive for palpitations  Negative for chest pain  Gastrointestinal: Negative for abdominal pain, constipation and diarrhea  Genitourinary: Negative for difficulty urinating  Musculoskeletal: Negative for arthralgias and gait problem  Skin: Negative for rash  Neurological: Negative for light-headedness and headaches  Psychiatric/Behavioral: Negative for dysphoric mood and sleep disturbance  The patient is not nervous/anxious         Past Medical History:     Past Medical History:   Diagnosis Date    Anemia     slight    Anesthesia     "after deviated septum surgery area just behind front teeth(incisive papilla) became very inflamed and sore/required prednisone"    Anesthesia complication     teeth chattering so much pt chipped tooth    Anxiety     Balance problem     "when experiencing dizziness" "from vertigo"    BRCA2 negative     Breast cancer (HCC)     Deformity of reconstructed breast     left breast "some pockets"    Dental crown present     Dental crown present     Difficulty swallowing     "at times"    Dizzy spells     "working with PCP"    Ear problem     "damage noted unknown cause, Right"    Fatigue     GERD (gastroesophageal reflux disease)     Limb alert care status     No BP/IV left side    Motion sickness     Nausea     "with dizziness"    Night sweat     occ    Palpitations     PONV (postoperative nausea and vomiting)     "had scop patch last surgery was helpful"    Seasonal allergies     Shortness of breath     "randomly"    Tinnitus     occas      Past Surgical History:     Past Surgical History:   Procedure Laterality Date    AUGMENTATION BREAST Right 12/18/2019    Procedure: AUGMENTATION BREAST; Surgeon: Lorne Mock MD;  [facility];  Service: Plastics    AUGMENTATION MAMMAPLASTY Bilateral 12/18/2019    BREAST BIOPSY Bilateral 02/20/2019    rt- fibroadenoma, lt- br ca    BREAST RECONSTRUCTION Left 12/18/2019    Procedure: RECONSTRUCTION BREAST W/ IMPLANT;  Surgeon: Lorne Mock MD;  [facility];  Service: Plastics    CAPSULOTOMY Left 12/18/2019    Procedure: CAPSULECTOMY;  Surgeon: Lorne Mock MD;  [facility];  Service: Plastics    COLONOSCOPY      COLPOSCOPY  2000    INSERTION OF BREAST TISSUE EXPANDER Left 5/17/2019    Procedure: INSERTION/PLACEMENT TISSUE EXPANDER (EXCHANGE);   Surgeon: Lorne Mock MD;  [facility];  Service: Plastics    LEG SURGERY Left     cyst removed from leg    LIPOSUCTION W/ FAT INJECTION Bilateral 8/19/2020    Procedure: MOUND FAT GRAFTING;  Surgeon: Lorne Mock MD;  [facility];  Service: Plastics    MASTECTOMY Left 05/17/2019    MASTECTOMY W/ SENTINEL NODE BIOPSY Left 5/17/2019    Procedure: MASTECTOMY WITH BIOPSY LYMPH NODE SENTINEL; NUC MED 0730;  Surgeon: Imelda Gardner MD;  [facility];  Service: Surgical Oncology    SD IMPLNT BIO IMPLNT FOR SOFT TISSUE REINFORCEMENT Left 5/17/2019    Procedure: RECONSTRUCTION BREAST W/ IMPLANT;  Surgeon: Lorne Mock MD;  [facility];  Service: Plastics    SD REVISION OF RECONSTRUCTED BREAST Bilateral 8/19/2020    Procedure: BREAST MOUND REVISION WITH;  Surgeon: Lorne Mock MD;  [facility];  Service: Plastics    SINUS SURGERY      US GUIDANCE BREAST BIOPSY RIGHT EACH ADDITIONAL Right 2/20/2019    US GUIDED BREAST BIOPSY LEFT COMPLETE Left 2/20/2019    WISDOM TOOTH EXTRACTION      WISDOM TOOTH EXTRACTION        Social History:     Social History     Socioeconomic History    Marital status: /Civil Union     Spouse name: None    Number of children: None    Years of education: None    Highest education level: None   Occupational History    Occupation: Student/Patient Care assoc   Tobacco Use    Smoking status: Former Smoker     Years: 7 00     Quit date:      Years since quittin 5    Smokeless tobacco: Never Used    Tobacco comment: was a casual former smoker    Vaping Use    Vaping Use: Never used   Substance and Sexual Activity    Alcohol use: Yes     Comment: wine    Drug use: No    Sexual activity: Yes     Partners: Male     Birth control/protection: None   Other Topics Concern    None   Social History Narrative    Pt's menstrual flow is regular lasting 5 days  Cycle is 30-31 days    Pt has had one pregnancy    Last PAP 10/2018    Last mammo was      Social Determinants of Health     Financial Resource Strain:     Difficulty of Paying Living Expenses:    Food Insecurity:     Worried About Running Out of Food in the Last Year:     Ran Out of Food in the Last Year:    Transportation Needs:     Lack of Transportation (Medical):      Lack of Transportation (Non-Medical):    Physical Activity:     Days of Exercise per Week:     Minutes of Exercise per Session:    Stress:     Feeling of Stress :    Social Connections:     Frequency of Communication with Friends and Family:     Frequency of Social Gatherings with Friends and Family:     Attends Alevism Services:     Active Member of Clubs or Organizations:     Attends Club or Organization Meetings:     Marital Status:    Intimate Partner Violence:     Fear of Current or Ex-Partner:     Emotionally Abused:     Physically Abused:     Sexually Abused:       Family History:     Family History   Problem Relation Age of Onset    Melanoma Maternal Grandfather 79    Leukemia Maternal Grandmother 80    Glaucoma Other     No Known Problems Mother     No Known Problems Father     Rectal cancer Maternal Aunt     No Known Problems Sister     No Known Problems Brother     No Known Problems Sister     No Known Problems Sister     Colon cancer Neg Hx     Colon polyps Neg Hx       Current Medications:     Current Outpatient Medications   Medication Sig Dispense Refill    diazepam (VALIUM) 5 mg tablet Take 1 tablet (5 mg total) by mouth every 6 (six) hours as needed for anxiety 15 tablet 0    ibuprofen (MOTRIN) 200 mg tablet Take 200-800 mg by mouth every 6 (six) hours as needed for mild pain       No current facility-administered medications for this visit  Allergies:     No Known Allergies   Physical Exam:     /68 (BP Location: Right arm, Patient Position: Sitting, Cuff Size: Standard)   Pulse 95   Temp 97 5 °F (36 4 °C) (Tympanic)   Ht 5' 4 5" (1 638 m)   Wt 59 6 kg (131 lb 8 oz)   LMP 06/03/2021   SpO2 96%   BMI 22 22 kg/m²     Physical Exam  Constitutional:       Appearance: She is well-developed  HENT:      Head: Normocephalic and atraumatic  Right Ear: Tympanic membrane, ear canal and external ear normal       Left Ear: Tympanic membrane, ear canal and external ear normal    Eyes:      Conjunctiva/sclera: Conjunctivae normal    Cardiovascular:      Rate and Rhythm: Normal rate and regular rhythm  Heart sounds: Normal heart sounds  No murmur heard  Pulmonary:      Effort: Pulmonary effort is normal  No respiratory distress  Breath sounds: Normal breath sounds  Abdominal:      General: Bowel sounds are normal  There is no distension  Palpations: Abdomen is soft  Tenderness: There is no abdominal tenderness  Musculoskeletal:         General: Normal range of motion  Cervical back: Normal range of motion and neck supple  Skin:     General: Skin is warm and dry  Findings: No rash  Neurological:      General: No focal deficit present  Mental Status: She is alert and oriented to person, place, and time  Psychiatric:         Mood and Affect: Mood normal          Behavior: Behavior normal          Thought Content:  Thought content normal          Judgment: Judgment normal           Maricel Sahu MD William Watson

## 2021-06-29 NOTE — PATIENT INSTRUCTIONS

## 2021-07-01 ENCOUNTER — APPOINTMENT (OUTPATIENT)
Dept: LAB | Facility: HOSPITAL | Age: 46
End: 2021-07-01
Attending: FAMILY MEDICINE
Payer: COMMERCIAL

## 2021-07-01 ENCOUNTER — LAB (OUTPATIENT)
Dept: LAB | Facility: HOSPITAL | Age: 46
End: 2021-07-01
Payer: COMMERCIAL

## 2021-07-01 DIAGNOSIS — Z00.8 HEALTH EXAMINATION IN POPULATION SURVEY: ICD-10-CM

## 2021-07-01 DIAGNOSIS — Z11.1 SCREENING FOR TUBERCULOSIS: ICD-10-CM

## 2021-07-01 LAB
CHOLEST SERPL-MCNC: 177 MG/DL (ref 50–200)
EST. AVERAGE GLUCOSE BLD GHB EST-MCNC: 91 MG/DL
HBA1C MFR BLD: 4.8 %
HDLC SERPL-MCNC: 58 MG/DL
LDLC SERPL CALC-MCNC: 105 MG/DL (ref 0–100)
NONHDLC SERPL-MCNC: 119 MG/DL
TRIGL SERPL-MCNC: 70 MG/DL

## 2021-07-01 PROCEDURE — 83036 HEMOGLOBIN GLYCOSYLATED A1C: CPT

## 2021-07-01 PROCEDURE — 86480 TB TEST CELL IMMUN MEASURE: CPT

## 2021-07-01 PROCEDURE — 80061 LIPID PANEL: CPT

## 2021-07-01 PROCEDURE — 36415 COLL VENOUS BLD VENIPUNCTURE: CPT

## 2021-07-02 LAB
GAMMA INTERFERON BACKGROUND BLD IA-ACNC: 0.04 IU/ML
M TB IFN-G BLD-IMP: NEGATIVE
M TB IFN-G CD4+ BCKGRND COR BLD-ACNC: -0.01 IU/ML
M TB IFN-G CD4+ BCKGRND COR BLD-ACNC: 0 IU/ML
MITOGEN IGNF BCKGRD COR BLD-ACNC: 6.17 IU/ML

## 2021-07-12 ENCOUNTER — TELEPHONE (OUTPATIENT)
Dept: OBGYN CLINIC | Facility: CLINIC | Age: 46
End: 2021-07-12

## 2021-07-12 NOTE — TELEPHONE ENCOUNTER
Per comm consent lm to pt with results and recommendations from [address]    ----- Message from Pierre Bains MD sent at 7/12/2021  7:53 AM EDT -----  Notify no change in size  Is asymptomatic we can continue to follow with sono in 6 months

## 2021-07-13 ENCOUNTER — HOSPITAL ENCOUNTER (OUTPATIENT)
Dept: NON INVASIVE DIAGNOSTICS | Age: 46
Discharge: HOME | End: 2021-07-13
Payer: COMMERCIAL

## 2021-07-13 DIAGNOSIS — R00.2 PALPITATIONS: ICD-10-CM

## 2021-07-13 PROCEDURE — 93226 XTRNL ECG REC<48 HR SCAN A/R: CPT

## 2021-07-13 PROCEDURE — 93225 XTRNL ECG REC<48 HRS REC: CPT

## 2021-07-14 ENCOUNTER — ANNUAL EXAM (OUTPATIENT)
Dept: OBGYN CLINIC | Facility: CLINIC | Age: 46
End: 2021-07-14
Payer: COMMERCIAL

## 2021-07-14 VITALS
DIASTOLIC BLOOD PRESSURE: 72 MMHG | WEIGHT: 133.4 LBS | HEIGHT: 64 IN | SYSTOLIC BLOOD PRESSURE: 122 MMHG | BODY MASS INDEX: 22.77 KG/M2

## 2021-07-14 DIAGNOSIS — N80.1 ENDOMETRIOMA OF OVARY: ICD-10-CM

## 2021-07-14 DIAGNOSIS — Z86.000 PERSONAL HISTORY OF IN-SITU NEOPLASM OF BREAST: ICD-10-CM

## 2021-07-14 DIAGNOSIS — Z01.419 ENCOUNTER FOR ANNUAL ROUTINE GYNECOLOGICAL EXAMINATION: Primary | ICD-10-CM

## 2021-07-14 PROCEDURE — 99396 PREV VISIT EST AGE 40-64: CPT | Performed by: OBSTETRICS & GYNECOLOGY

## 2021-07-15 PROBLEM — N80.1 ENDOMETRIOMA OF OVARY: Status: ACTIVE | Noted: 2021-07-15

## 2021-07-15 PROBLEM — N80.129 ENDOMETRIOMA OF OVARY: Status: ACTIVE | Noted: 2021-07-15

## 2021-07-15 NOTE — PROGRESS NOTES
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      3  Endometrioma of ovary, right  Discussed options of LSC BSO versus expectant management/recheck in 6 months  Patient feels that since she is not very symptomatic and size is stable she would prefer to continue with expectant management  She does not feel that she ready to have surgical menopause at this time  F/U sono ordered  Asked patient to call with increased pain  Jolanta Cramer is a 39 y o  female who presents for annual exam  Periods are regular every 28 days, lasting 6 days  Dysmenorrhea:none  Occasionally has sensation of fullness and right hip pain  No intermenstrual bleeding, spotting, or discharge  The patient denies any urinary or sexual concerns  Current contraception: none; h/o infertility  History of abnormal Pap smear: no  Regular self breast exam: yes  History of abnormal mammogram: yes - self/breast CA  Family history of breast cancer: yes - self  History of abnormal lipids: no  Menstrual History:  OB History        1    Para   1    Term   1            AB        Living           SAB        TAB        Ectopic        Multiple        Live Births               Obstetric Comments   Menarche : 17  Age at first pregnancy 31  Hx of BCP   Hx of Hormone replacement            Patient's last menstrual period was 2021    Period Cycle (Days): 28  Period Duration (Days): 5-6  Period Pattern: Regular  Menstrual Flow: Moderate, Heavy (DAy 2+3 heavy flow)  Dysmenorrhea: None    Past Medical History:   Diagnosis Date    Anemia     slight    Anesthesia     "after deviated septum surgery area just behind front teeth(incisive papilla) became very inflamed and sore/required prednisone"    Anesthesia complication     teeth chattering so much pt chipped tooth    Anxiety     Balance problem     "when experiencing dizziness" "from vertigo"    BRCA2 negative     Breast cancer ([facility] [address] )     Deformity of reconstructed breast     left breast "some pockets"    Dental crown present     Dental crown present     Difficulty swallowing     "at times"    Dizzy spells     "working with PCP"    Ear problem     "damage noted unknown cause, Right"    Fatigue     GERD (gastroesophageal reflux disease)     Limb alert care status     No BP/IV left side    Motion sickness     Nausea     "with dizziness"    Night sweat     occ    Palpitations     PONV (postoperative nausea and vomiting)     "had scop patch last surgery was helpful"    Seasonal allergies     Shortness of breath     "randomly"    Tinnitus     occas       Family History   Problem Relation Age of Onset    Melanoma Maternal Grandfather 79    Leukemia Maternal Grandmother 80    Glaucoma Other     No Known Problems Mother     No Known Problems Father     Rectal cancer Maternal Aunt     No Known Problems Sister     No Known Problems Brother     No Known Problems Sister     No Known Problems Sister     Colon cancer Neg Hx     Colon polyps Neg Hx        The following portions of the patient's history were reviewed and updated as appropriate: allergies, current medications, past family history, past medical history, past social history, past surgical history and problem list     Review of Systems  Pertinent items are noted in HPI        Objective      /72 (BP Location: Right arm, Patient Position: Sitting, Cuff Size: Standard)   Ht 5' 4" (1 626 m)   Wt 60 5 kg (133 lb 6 4 oz)   LMP 06/30/2021   BMI 22 90 kg/m²     General:   alert and oriented, in no acute distress   Heart:    Breasts: regular rate and rhythm, S1, S2 normal, no murmur, click, rub or gallop   surgical scars noted; no masses noted; normal axillary nodes   Lungs: clear to auscultation bilaterally   Abdomen: soft, non-tender, without masses or organomegaly   Vulva: normal   Vagina: normal mucosa   Cervix: no lesions   Uterus: normal size, mobile, non-tender   Adnexa: normal left adnexa; full mobile 5cm right adnexa        UTERUS:  The uterus is anteverted in position, measuring 8 2 x 4 1 x 5 7 cm  Contour and echotexture appear normal   The cervix shows no suspicious abnormality    ENDOMETRIUM:    Normal caliber of 10 mm  Homogeneous and normal in appearance    OVARIES/ADNEXA:  Right ovary:  6 5 x 4 6 x 6 2 cm  Isoechoic mass with low-level internal echoes measuring 5 5 x 3 7 x 5 6 cm  Doppler flow within normal limits    Left ovary:  2 6 x 1 6 x 1 6 cm  No suspicious left ovarian abnormality  Prominent tubular vessels identified adjacent to the ovary  Doppler flow within normal limits    No suspicious adnexal mass or loculated collections  There is no free fluid    IMPRESSION:   Right adnexal mass measuring 5 5 x 3 7 x 5 6 cm suggestive for endometrioma  Previously this measured 5 2 x 3 5 x 5 0 cm  Follow-up per GYN    Prominent left adnexal region vascularity similar to prior study to be correlated for pelvic congestion syndrome

## 2021-07-19 ENCOUNTER — TELEPHONE (OUTPATIENT)
Dept: FAMILY MEDICINE CLINIC | Facility: CLINIC | Age: 46
End: 2021-07-19

## 2021-07-19 NOTE — TELEPHONE ENCOUNTER
David Longoira had it, I would imagine that it was put in to be scanned so it should be in her chart  It should be able to be printed and sent to her

## 2021-07-19 NOTE — TELEPHONE ENCOUNTER
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 Please advise

## 2021-07-20 PROCEDURE — 93227 XTRNL ECG REC<48 HR R&I: CPT | Performed by: INTERNAL MEDICINE

## 2021-07-21 ENCOUNTER — OFFICE VISIT (OUTPATIENT)
Dept: GASTROENTEROLOGY | Facility: CLINIC | Age: 46
End: 2021-07-21
Payer: COMMERCIAL

## 2021-07-21 VITALS
SYSTOLIC BLOOD PRESSURE: 106 MMHG | WEIGHT: 132.8 LBS | HEIGHT: 64 IN | DIASTOLIC BLOOD PRESSURE: 74 MMHG | HEART RATE: 75 BPM | BODY MASS INDEX: 22.67 KG/M2

## 2021-07-21 DIAGNOSIS — K64.0 GRADE I HEMORRHOIDS: Primary | ICD-10-CM

## 2021-07-21 PROCEDURE — 46221 LIGATION OF HEMORRHOID(S): CPT | Performed by: INTERNAL MEDICINE

## 2021-07-21 NOTE — PATIENT INSTRUCTIONS
Hemorrhoids   WHAT YOU NEED TO KNOW:   Hemorrhoids are swollen blood vessels inside your rectum (internal hemorrhoids) or on your anus (external hemorrhoids)  Sometimes a hemorrhoid may prolapse  This means it extends out of your anus  DISCHARGE INSTRUCTIONS:   Return to the emergency department if:   · You have severe pain in your rectum or around your anus  · You have severe pain in your abdomen and you are vomiting  · You have bleeding from your anus that soaks through your underwear  Contact your healthcare provider if:   · You have frequent and painful bowel movements  · Your hemorrhoid looks or feels more swollen than usual      · You do not have a bowel movement for 2 days or more  · You see or feel tissue coming through your anus  · You have questions or concerns about your condition or care  Medicines: You may  need any of the following:  · A pad, cream, or ointment  can help decrease pain, swelling, and itching  · Stool softeners  help treat or prevent constipation  · NSAIDs , such as ibuprofen, help decrease swelling, pain, and fever  NSAIDs can cause stomach bleeding or kidney problems in certain people  If you take blood thinner medicine, always ask your healthcare provider if NSAIDs are safe for you  Always read the medicine label and follow directions  · Take your medicine as directed  Contact your healthcare provider if you think your medicine is not helping or if you have side effects  Tell him or her if you are allergic to any medicine  Keep a list of the medicines, vitamins, and herbs you take  Include the amounts, and when and why you take them  Bring the list or the pill bottles to follow-up visits  Carry your medicine list with you in case of an emergency  Manage your symptoms:   · Apply ice on your anus for 15 to 20 minutes every hour or as directed  Use an ice pack, or put crushed ice in a plastic bag   Cover it with a towel before you apply it to your anus  Ice helps prevent tissue damage and decreases swelling and pain  · Take a sitz bath  Fill a bathtub with 4 to 6 inches of warm water  You may also use a sitz bath pan that fits inside a toilet bowl  Sit in the sitz bath for 15 minutes  Do this 3 times a day, and after each bowel movement  The warm water can help decrease pain and swelling  · Keep your anal area clean  Gently wash the area with warm water daily  Soap may irritate the area  After a bowel movement, wipe with moist towelettes or wet toilet paper  Dry toilet paper can irritate the area  Prevent hemorrhoids:   · Do not strain to have a bowel movement  Do not sit on the toilet too long  These actions can increase pressure on the tissues in your rectum and anus  · Drink plenty of liquids  Liquids can help prevent constipation  Ask how much liquid to drink each day and which liquids are best for you  · Eat a variety of high-fiber foods  Examples include fruits, vegetables, and whole grains  Ask your healthcare provider how much fiber you need each day  You may need to take a fiber supplement  · Exercise as directed  Exercise, such as walking, may make it easier to have a bowel movement  Ask your healthcare provider to help you create an exercise plan  · Do not have anal sex  Anal sex can weaken the skin around your rectum and anus  · Avoid heavy lifting  This can cause straining and increase your risk for another hemorrhoid  Follow up with your healthcare provider as directed:  Write down your questions so you remember to ask them during your visits  © Copyright [employer] 2021 Information is for End User's use only and may not be sold, redistributed or otherwise used for commercial purposes  All illustrations and images included in CareNotes® are the copyrighted property of A D A M , Inc  or [facility] Jamie Hayes   The above information is an  only   It is not intended as medical advice for individual conditions or treatments  Talk to your doctor, nurse or pharmacist before following any medical regimen to see if it is safe and effective for you

## 2021-07-21 NOTE — PROGRESS NOTES
[facility] - Hemorrhoid Banding Maurilio Scott 39 y o  female MRN: [id number]  Encounter: [id number]    ASSESSMENT AND PLAN:    The left lateral hemorrhoid area was banded today  The patient was instructed to avoid constipation and straining, and educated in appropriate fiber intake  HPI: Maurilio Scott is a 39y o  year old female who presents with hemorrhoid prolapse and occasional seepage  Previous treatments include:  None  Bands were previously placed:  None   Current Fiber intake:  Dietary  Complications of prior therapy include: n/a    The patient provided consent for banding  and was placed in the left lateral position  Rectal exam showed grade 1 hemorrhoids  The [place] ligator was advanced and a band was applied without difficulty   Repeat rectal exam confirmed appropriate placement  The patient was discharged home after observation in the waiting area    Also advised patient follow-up with pelvic floor physical therapy

## 2021-07-22 ENCOUNTER — HOSPITAL ENCOUNTER (OUTPATIENT)
Dept: MRI IMAGING | Facility: HOSPITAL | Age: 46
Discharge: HOME | End: 2021-07-22
Attending: SURGERY
Payer: COMMERCIAL

## 2021-07-22 ENCOUNTER — TELEPHONE (OUTPATIENT)
Dept: SURGICAL ONCOLOGY | Facility: CLINIC | Age: 46
End: 2021-07-22

## 2021-07-22 VITALS — WEIGHT: 133 LBS | HEIGHT: 64 IN | BODY MASS INDEX: 22.71 KG/M2

## 2021-07-22 DIAGNOSIS — Z86.000 PERSONAL HISTORY OF IN-SITU NEOPLASM OF BREAST: ICD-10-CM

## 2021-07-22 DIAGNOSIS — R92.2 DENSE BREAST TISSUE: ICD-10-CM

## 2021-07-22 DIAGNOSIS — R92.8 ABNORMAL MRI, BREAST: Primary | ICD-10-CM

## 2021-07-22 PROCEDURE — A9585 GADOBUTROL INJECTION: HCPCS | Performed by: SURGERY

## 2021-07-22 PROCEDURE — G1004 CDSM NDSC: HCPCS

## 2021-07-22 PROCEDURE — C8908 MRI W/O FOL W/CONT, BREAST,: HCPCS

## 2021-07-22 RX ADMIN — GADOBUTROL 6 ML: 604.72 INJECTION INTRAVENOUS at 09:45

## 2021-07-23 ENCOUNTER — TELEPHONE (OUTPATIENT)
Dept: HEMATOLOGY ONCOLOGY | Facility: CLINIC | Age: 46
End: 2021-07-23

## 2021-07-23 NOTE — TELEPHONE ENCOUNTER
Patient would like a call back about her chart message and would like more information  Please call back at [phone]

## 2021-08-02 ENCOUNTER — HOSPITAL ENCOUNTER (OUTPATIENT)
Dept: ULTRASOUND IMAGING | Facility: CLINIC | Age: 46
Discharge: HOME | End: 2021-08-02
Payer: COMMERCIAL

## 2021-08-02 DIAGNOSIS — R92.8 ABNORMAL MRI, BREAST: ICD-10-CM

## 2021-08-02 PROCEDURE — 76642 ULTRASOUND BREAST LIMITED: CPT

## 2021-08-04 ENCOUNTER — TELEPHONE (OUTPATIENT)
Dept: GASTROENTEROLOGY | Facility: CLINIC | Age: 46
End: 2021-08-04

## 2021-08-04 NOTE — TELEPHONE ENCOUNTER
Pt advised re: wait list   States next week she only works on Friday  No longer painful  Only had blood around the stool on Sunday when it first appeared

## 2021-08-04 NOTE — TELEPHONE ENCOUNTER
Pt states she had a hemorrhoid banding almost 2 wks ago; at time of banding nothing was sticking out  Now it feels like it is in a different location-protrudes after BM or straining/excercise; is uncomfortable, not painful; questions if this is normal?; is unsure if this is the same hemorrhoid or a different one  # [phone]

## 2021-08-16 ENCOUNTER — OFFICE VISIT (OUTPATIENT)
Dept: SURGICAL ONCOLOGY | Facility: CLINIC | Age: 46
End: 2021-08-16
Payer: COMMERCIAL

## 2021-08-16 VITALS
HEIGHT: 64 IN | WEIGHT: 131 LBS | HEART RATE: 78 BPM | DIASTOLIC BLOOD PRESSURE: 68 MMHG | OXYGEN SATURATION: 99 % | SYSTOLIC BLOOD PRESSURE: 90 MMHG | BODY MASS INDEX: 22.36 KG/M2 | TEMPERATURE: 98 F | RESPIRATION RATE: 16 BRPM

## 2021-08-16 DIAGNOSIS — Z13.71 BRCA NEGATIVE: ICD-10-CM

## 2021-08-16 DIAGNOSIS — Z85.3 ENCOUNTER FOR FOLLOW-UP SURVEILLANCE OF BREAST CANCER: Primary | ICD-10-CM

## 2021-08-16 DIAGNOSIS — Z86.000 PERSONAL HISTORY OF IN-SITU NEOPLASM OF BREAST: ICD-10-CM

## 2021-08-16 DIAGNOSIS — Z08 ENCOUNTER FOR FOLLOW-UP SURVEILLANCE OF BREAST CANCER: Primary | ICD-10-CM

## 2021-08-16 DIAGNOSIS — R92.8 ABNORMAL MRI, BREAST: ICD-10-CM

## 2021-08-16 PROCEDURE — 99214 OFFICE O/P EST MOD 30 MIN: CPT | Performed by: SURGERY

## 2021-08-16 NOTE — PROGRESS NOTES
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    BRCA negative    Abnormal MRI, breast  -     MRI breast bilateral w and wo contrast w cad; Future        Advance Care Planning/Advance Directives:  Discussed disease status, cancer treatment plans and/or cancer treatment goals with the patient       Oncology History:    Oncology History   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   2/20/2019 Biopsy    Left breast biopsy 12:00 [facility]     DCIS  ER/%     3/6/2019 Genetic Testing    Integrated [employer] with [employer]® Hereditary Cancer  Update Test-    APC, LIBERTY, AXIN2, BARD1, BMPR1A, BRCA1, BRCA2, BRIP1, CDH1, CDK4, CDKN2A, CHEK2, EPCAM (large rearrangment only), HOXB13 (sequencing only), GALNT12, MLH1, MSH2, MSH3 (excluding repetitive portions of exon 1), MSH6, MUTYH, NBN, NTHL1, PALB2, PMS2, PTEN, RAD51C, RAD51D, RNF43, RPS20, SMAD4, STK11, TP53, Sequencing was performed for select regions of POLE and POLD1, and large rearrangement analysis was performed for select regions of GREM1         Negative     5/29/2019 -  Cancer Staged    Staging form: Breast, AJCC 8th Edition  - Pathologic: Stage 0 (pTis (DCIS), pN0(sn), cM0, G2, ER+, WA+, HER2: Not Assessed) - Signed by Fred Espinoza MD on 5/29/2019  Neoadjuvant therapy: No  Laterality: Left  Method of lymph node assessment: [place] lymph node biopsy  Histologic grading system: 3 grade system           History of Present Illness: Breast cancer follow-up, states that she occasionally has swelling in the reconstructed breast especially after doing massage,  Also noted some itching of the right nipple, no additional concerns  -Interval History: recent MRI    Review of Systems:  Review of Systems   Constitutional: Negative  Negative for appetite change and fever  Eyes: Negative  Respiratory: Negative for shortness of breath  Cardiovascular: Negative  Gastrointestinal: Negative  Endocrine: Negative  Genitourinary: Negative  Musculoskeletal: Negative  Negative for arthralgias and myalgias  Skin: Negative  Allergic/Immunologic: Negative  Neurological: Negative  Hematological: Negative  Negative for adenopathy  Does not bruise/bleed easily  Psychiatric/Behavioral: Negative          Patient Active Problem List   Diagnosis    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    Dense breast tissue    Laryngopharyngeal reflux (LPR)    Dizziness    DNS (deviated nasal septum)    Low libido    Dyspareunia, female   Suzette Birch Secondary female infertility    Breast cancer screening, high risk patient    Capsular contracture of breast implant    Encounter for follow-up surveillance of breast cancer    Anesthesia    PONV (postoperative nausea and vomiting)    Dental crown present    Deformity and disproportion of reconstructed breast    RLQ abdominal pain    Rectal discomfort    Epigastric pressure    Dysphagia    Endometrioma of ovary    Grade I hemorrhoids    Abnormal MRI, breast     Past Medical History:   Diagnosis Date    Anemia     slight    Anesthesia     "after deviated septum surgery area just behind front teeth(incisive papilla) became very inflamed and sore/required prednisone"    Anesthesia complication     teeth chattering so much pt chipped tooth    Anxiety     Balance problem     "when experiencing dizziness" "from vertigo"    Deformity of reconstructed breast     left breast "some pockets"  Dental crown present     Dental crown present     Difficulty swallowing     "at times"    Dizzy spells     "working with PCP"    Ear problem     "damage noted unknown cause, Right"    Fatigue     GERD (gastroesophageal reflux disease)     Limb alert care status     No BP/IV left side    Motion sickness     Nausea     "with dizziness"    Night sweat     occ    Palpitations     PONV (postoperative nausea and vomiting)     "had scop patch last surgery was helpful"    Seasonal allergies     Shortness of breath     "randomly"    Tinnitus     occas     Past Surgical History:   Procedure Laterality Date    AUGMENTATION BREAST Right 12/18/2019    Procedure: AUGMENTATION BREAST;  Surgeon: Azalia Zapata MD;  [facility];  Service: Plastics    AUGMENTATION MAMMAPLASTY Bilateral 12/18/2019    BREAST BIOPSY Bilateral 02/20/2019    rt- fibroadenoma, lt- br ca    BREAST RECONSTRUCTION Left 12/18/2019    Procedure: RECONSTRUCTION BREAST W/ IMPLANT;  Surgeon: Azalia Zapata MD;  [facility];  Service: Plastics    CAPSULOTOMY Left 12/18/2019    Procedure: CAPSULECTOMY;  Surgeon: Azalia Zapata MD;  [facility];  Service: Plastics    COLONOSCOPY      COLPOSCOPY  2000    INSERTION OF BREAST TISSUE EXPANDER Left 5/17/2019    Procedure: INSERTION/PLACEMENT TISSUE EXPANDER (EXCHANGE);   Surgeon: Azalia Zapata MD;  [facility];  Service: Plastics    LEG SURGERY Left     cyst removed from leg    LIPOSUCTION W/ FAT INJECTION Bilateral 8/19/2020    Procedure: MOUND FAT GRAFTING;  Surgeon: Azalia Zapata MD;  [facility];  Service: Plastics    MASTECTOMY Left 05/17/2019    MASTECTOMY W/ SENTINEL NODE BIOPSY Left 5/17/2019    Procedure: MASTECTOMY WITH BIOPSY LYMPH NODE SENTINEL; NUC MED 0730;  Surgeon: Walter Palma MD;  [facility];  Service: Surgical Oncology    NH IMPLNT BIO IMPLNT FOR SOFT TISSUE REINFORCEMENT Left 5/17/2019    Procedure: RECONSTRUCTION BREAST W/ IMPLANT;  Surgeon: Lynn Negro MD;  [facility];  Service: Plastics    [place] REVISION OF RECONSTRUCTED BREAST Bilateral 2020    Procedure: BREAST MOUND REVISION WITH;  Surgeon: Lynn Negro MD;  [facility];  Service: Plastics    SINUS SURGERY      US GUIDANCE BREAST BIOPSY RIGHT EACH ADDITIONAL Right 2019    US GUIDED BREAST BIOPSY LEFT COMPLETE Left 2019    WISDOM TOOTH EXTRACTION      WISDOM TOOTH EXTRACTION       Family History   Problem Relation Age of Onset    Melanoma Maternal Grandfather 79    Leukemia Maternal Grandmother 80    Glaucoma Other     No Known Problems Mother     No Known Problems Father     Rectal cancer Maternal Aunt     No Known Problems Sister     No Known Problems Brother     No Known Problems Sister     No Known Problems Sister     Colon cancer Neg Hx     Colon polyps Neg Hx      Social History     Socioeconomic History    Marital status: /Civil Union     Spouse name: Not on file    Number of children: Not on file    Years of education: Not on file    Highest education level: Not on file   Occupational History    Occupation: Student/Patient Care assoc   Tobacco Use    Smoking status: Former Smoker     Years:      Quit date:      Years since quittin 6    Smokeless tobacco: Never Used    Tobacco comment: was a casual former smoker    Vaping Use    Vaping Use: Never used   Substance and Sexual Activity    Alcohol use: Yes     Comment: wine    Drug use: No    Sexual activity: Yes     Partners: Male     Birth control/protection: None   Other Topics Concern    Not on file   Social History Narrative    Pt's menstrual flow is regular lasting 5 days   Cycle is 30-31 days    Pt has had one pregnancy    Last PAP 10/2018    Last mammo was      Social Determinants of Health     Financial Resource Strain:     Difficulty of Paying Living Expenses:    Food Insecurity:     Worried About Running Out of Food in the Last Year:    [address] in the Last Year:    Transportation Needs:     Lack of Transportation (Medical):  Lack of Transportation (Non-Medical):    Physical Activity:     Days of Exercise per Week:     Minutes of Exercise per Session:    Stress:     Feeling of Stress :    Social Connections:     Frequency of Communication with Friends and Family:     Frequency of Social Gatherings with Friends and Family:     Attends Gnosticist Services:     Active Member of Clubs or Organizations:     Attends Club or Organization Meetings:     Marital Status:    Intimate Partner Violence:     Fear of Current or Ex-Partner:     Emotionally Abused:     Physically Abused:     Sexually Abused:        Current Outpatient Medications:     ibuprofen (MOTRIN) 200 mg tablet, Take 200-800 mg by mouth every 6 (six) hours as needed for mild pain , Disp: , Rfl:     diazepam (VALIUM) 5 mg tablet, Take 1 tablet (5 mg total) by mouth every 6 (six) hours as needed for anxiety (Patient not taking: Reported on 7/14/2021), Disp: 15 tablet, Rfl: 0  No Known Allergies    The following portions of the patient's history were reviewed and updated as appropriate: allergies, current medications, past family history, past medical history, past social history, past surgical history and problem list         Vitals:    08/16/21 0755   BP: 90/68   Pulse: 78   Resp: 16   Temp: 98 °F (36 7 °C)   SpO2: 99%       Physical Exam  Constitutional:       General: She is not in acute distress  Appearance: Normal appearance  She is well-developed  HENT:      Head: Normocephalic and atraumatic  Cardiovascular:      Heart sounds: Normal heart sounds  Pulmonary:      Breath sounds: Normal breath sounds  Chest:      Breasts:         Right: Skin change ( mild dry Skin nipple-areolar complex) present  No inverted nipple, mass, nipple discharge or tenderness  Left: Skin change ( mastectomy scar with implant) present   No mass or tenderness  Abdominal:      Palpations: Abdomen is soft  Lymphadenopathy:      Upper Body:      Right upper body: No supraclavicular, axillary or pectoral adenopathy  Left upper body: No supraclavicular, axillary or pectoral adenopathy  Neurological:      Mental Status: She is alert and oriented to person, place, and time  Psychiatric:         Mood and Affect: Mood normal            Results:  Labs:      Imaging   07/22/2021 breast MRI was a BI-RADS 0 secondary to non mass enhancement left upper inner quadrant   08/02/2021 left breast ultrasound showed no correlate and this was thought to be likely fat necrosis for which a six month follow-up MRI was recommended    I reviewed the above imaging data  Discussion/Summary: 47-year-old female status post left mastectomy and reconstruction secondary to ductal carcinoma in Situ  Her genetic testing was negative  She had a recent breast MRI which was abnormal   There was no sonographic correlate  A six month follow-up MRI is recommended  There are no concerns on examination today  She can likely discontinue the massage of the reconstructed breast, especially as she reports inflammation after this  She would like to push her right-sided mammogram out as she is due for another MRI in six months  This is acceptable  I will therefore make arrangements for an MRI due the end of January  I will see her again in six months or sooner should the need arise

## 2021-11-18 ENCOUNTER — IMMUNIZATIONS (OUTPATIENT)
Dept: FAMILY MEDICINE CLINIC | Facility: HOSPITAL | Age: 46
End: 2021-11-18

## 2021-11-18 DIAGNOSIS — Z23 ENCOUNTER FOR IMMUNIZATION: Primary | ICD-10-CM

## 2021-11-18 PROCEDURE — 0001A COVID-19 PFIZER VACC 0.3 ML: CPT

## 2021-11-18 PROCEDURE — 91300 COVID-19 PFIZER VACC 0.3 ML: CPT

## 2022-01-26 ENCOUNTER — HOSPITAL ENCOUNTER (OUTPATIENT)
Dept: MRI IMAGING | Facility: HOSPITAL | Age: 47
Discharge: HOME | End: 2022-01-26
Attending: SURGERY
Payer: COMMERCIAL

## 2022-01-26 VITALS — BODY MASS INDEX: 22.36 KG/M2 | WEIGHT: 130.95 LBS | HEIGHT: 64 IN

## 2022-01-26 DIAGNOSIS — R92.8 ABNORMAL MRI, BREAST: ICD-10-CM

## 2022-01-26 PROCEDURE — A9585 GADOBUTROL INJECTION: HCPCS | Performed by: SURGERY

## 2022-01-26 PROCEDURE — C8908 MRI W/O FOL W/CONT, BREAST,: HCPCS

## 2022-01-26 PROCEDURE — C8937 CAD BREAST MRI: HCPCS

## 2022-01-26 PROCEDURE — G1004 CDSM NDSC: HCPCS

## 2022-01-26 RX ADMIN — GADOBUTROL 6 ML: 604.72 INJECTION INTRAVENOUS at 18:16

## 2022-01-31 ENCOUNTER — TELEPHONE (OUTPATIENT)
Dept: SURGICAL ONCOLOGY | Facility: CLINIC | Age: 47
End: 2022-01-31

## 2022-01-31 DIAGNOSIS — R92.8 ABNORMAL MRI, BREAST: Primary | ICD-10-CM

## 2022-01-31 NOTE — TELEPHONE ENCOUNTER
Left message for patient explaining based on her MRI Dr Marlen Ross put in order for a bilateral US and right sided MRI  Left central scheduling's number for pt to schedule the additional imaging  Left office number as a call back

## 2022-02-18 ENCOUNTER — TELEPHONE (OUTPATIENT)
Dept: SURGICAL ONCOLOGY | Facility: CLINIC | Age: 47
End: 2022-02-18

## 2022-02-18 NOTE — TELEPHONE ENCOUNTER
As per Dr Saira Nuñez, called and offered Lynne to change her appt with Dr Saira Nuñez to 03/02/22 at 8:30 AM due to her breast US being scheduled for 02/22/22  Informed her Dr Saira Nuñez would like the results of her imaging for her appt  She understands and is agreeable

## 2022-02-22 ENCOUNTER — HOSPITAL ENCOUNTER (OUTPATIENT)
Dept: ULTRASOUND IMAGING | Facility: CLINIC | Age: 47
Discharge: HOME | End: 2022-02-22
Payer: COMMERCIAL

## 2022-02-22 ENCOUNTER — HOSPITAL ENCOUNTER (OUTPATIENT)
Dept: MAMMOGRAPHY | Facility: CLINIC | Age: 47
Discharge: HOME | End: 2022-02-22
Payer: COMMERCIAL

## 2022-02-22 DIAGNOSIS — R92.8 ABNORMAL MRI, BREAST: ICD-10-CM

## 2022-02-22 PROCEDURE — 77065 DX MAMMO INCL CAD UNI: CPT

## 2022-02-22 PROCEDURE — G0279 TOMOSYNTHESIS, MAMMO: HCPCS

## 2022-02-22 PROCEDURE — 76642 ULTRASOUND BREAST LIMITED: CPT

## 2022-03-02 ENCOUNTER — OFFICE VISIT (OUTPATIENT)
Dept: SURGICAL ONCOLOGY | Facility: CLINIC | Age: 47
End: 2022-03-02
Payer: COMMERCIAL

## 2022-03-02 VITALS
HEART RATE: 80 BPM | TEMPERATURE: 97.6 F | DIASTOLIC BLOOD PRESSURE: 68 MMHG | RESPIRATION RATE: 16 BRPM | OXYGEN SATURATION: 100 % | BODY MASS INDEX: 22.57 KG/M2 | WEIGHT: 132.2 LBS | HEIGHT: 64 IN | SYSTOLIC BLOOD PRESSURE: 104 MMHG

## 2022-03-02 DIAGNOSIS — N64.1 FAT NECROSIS OF BOTH BREASTS: ICD-10-CM

## 2022-03-02 DIAGNOSIS — Z08 ENCOUNTER FOR FOLLOW-UP SURVEILLANCE OF BREAST CANCER: Primary | ICD-10-CM

## 2022-03-02 DIAGNOSIS — Z85.3 ENCOUNTER FOR FOLLOW-UP SURVEILLANCE OF BREAST CANCER: Primary | ICD-10-CM

## 2022-03-02 DIAGNOSIS — R92.8 ABNORMAL MRI, BREAST: ICD-10-CM

## 2022-03-02 DIAGNOSIS — R92.2 DENSE BREAST TISSUE: ICD-10-CM

## 2022-03-02 DIAGNOSIS — Z86.000 PERSONAL HISTORY OF IN-SITU NEOPLASM OF BREAST: ICD-10-CM

## 2022-03-02 PROCEDURE — 99214 OFFICE O/P EST MOD 30 MIN: CPT | Performed by: SURGERY

## 2022-03-02 NOTE — PROGRESS NOTES
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    Dense breast tissue    Abnormal MRI, breast  -     MRI breast bilateral w and wo contrast w cad; Future    Fat necrosis of both breasts        Advance Care Planning/Advance Directives:  Discussed disease status, cancer treatment plans and/or cancer treatment goals with the patient       Oncology History:    Oncology History   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   2/20/2019 Biopsy    Left breast biopsy 12:00 Per[facility]olar     DCIS  ER/%     3/6/2019 Genetic Testing    Integrated [employer] with [employer] Hereditary Cancer  Update Test-    APC, LIBERTY, AXIN2, BARD1, BMPR1A, BRCA1, BRCA2, BRIP1, CDH1, CDK4, CDKN2A, CHEK2, EPCAM (large rearrangment only), HOXB13 (sequencing only), GALNT12, MLH1, MSH2, MSH3 (excluding repetitive portions of exon 1), MSH6, MUTYH, NBN, NTHL1, PALB2, PMS2, PTEN, RAD51C, RAD51D, RNF43, RPS20, SMAD4, STK11, TP53, Sequencing was performed for select regions of POLE and POLD1, and large rearrangement analysis was performed for select regions of GREM1         Negative     5/29/2019 -  Cancer Staged    Staging form: Breast, AJCC 8th Edition  - Pathologic: Stage 0 (pTis (DCIS), pN0(sn), cM0, G2, ER+, OH+, HER2: Not Assessed) - Signed by Arcadio Middleton MD on 5/29/2019  Neoadjuvant therapy: No  Laterality: Left  Method of lymph node assessment: [place] lymph node biopsy  Histologic grading system: 3 grade system           History of Present Illness:  Breast cancer follow-up, states that she does feel lumpiness on the right breast, reports that the larger area nodularity on the left side has broken up into smaller areas  -Interval History:  recent imaging    Review of Systems:  Review of Systems   Constitutional: Negative  Negative for appetite change and fever  Eyes: Negative  Respiratory: Negative for shortness of breath  Cardiovascular: Negative  Gastrointestinal: Negative  Endocrine: Negative  Genitourinary: Negative  Musculoskeletal: Negative  Negative for arthralgias and myalgias  Skin: Negative  Allergic/Immunologic: Negative  Neurological: Negative  Hematological: Negative  Negative for adenopathy  Does not bruise/bleed easily  Psychiatric/Behavioral: Negative          Patient Active Problem List   Diagnosis    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    Dense breast tissue    Laryngopharyngeal reflux (LPR)    Dizziness    DNS (deviated nasal septum)    Low libido    Dyspareunia, female   Zarina  Secondary female infertility    Breast cancer screening, high risk patient    Capsular contracture of breast implant    Encounter for follow-up surveillance of breast cancer    Anesthesia    PONV (postoperative nausea and vomiting)    Dental crown present    Deformity and disproportion of reconstructed breast    RLQ abdominal pain    Rectal discomfort    Epigastric pressure    Dysphagia    Endometrioma of ovary    Grade I hemorrhoids    Abnormal MRI, breast    Fat necrosis of both breasts     Past Medical History:   Diagnosis Date    Anemia     slight    Anesthesia     "after deviated septum surgery area just behind front teeth(incisive papilla) became very inflamed and sore/required prednisone"    Anesthesia complication     teeth chattering so much pt chipped tooth    Anxiety     Balance problem     "when experiencing dizziness" "from vertigo"    Deformity of reconstructed breast     left breast "some pockets"    Dental crown present     Dental crown present     Difficulty swallowing     "at times"    Dizzy spells     "working with PCP"    Ear problem     "damage noted unknown cause, Right"    Fatigue     GERD (gastroesophageal reflux disease)     Limb alert care status     No BP/IV left side    Motion sickness     Nausea     "with dizziness"    Night sweat     occ    Palpitations     PONV (postoperative nausea and vomiting)     "had scop patch last surgery was helpful"    Seasonal allergies     Shortness of breath     "randomly"    Tinnitus     occas     Past Surgical History:   Procedure Laterality Date    AUGMENTATION BREAST Right 12/18/2019    Procedure: AUGMENTATION BREAST;  Surgeon: Beryl Osgood, MD;  [facility];  Service: Plastics    AUGMENTATION MAMMAPLASTY Bilateral 12/18/2019    BREAST BIOPSY Bilateral 02/20/2019    rt- fibroadenoma, lt- br ca    BREAST RECONSTRUCTION Left 12/18/2019    Procedure: RECONSTRUCTION BREAST W/ IMPLANT;  Surgeon: Beryl Osgood, MD;  [facility];  Service: Plastics    CAPSULOTOMY Left 12/18/2019    Procedure: CAPSULECTOMY;  Surgeon: Beryl Osgood, MD;  [facility];  Service: Plastics    COLONOSCOPY      COLPOSCOPY  2000    INSERTION OF BREAST TISSUE EXPANDER Left 5/17/2019    Procedure: INSERTION/PLACEMENT TISSUE EXPANDER (EXCHANGE);   Surgeon: Beryl Osgood, MD;  [facility];  Service: Plastics    LEG SURGERY Left     cyst removed from leg    LIPOSUCTION W/ FAT INJECTION Bilateral 8/19/2020    Procedure: MOUND FAT GRAFTING;  Surgeon: Beryl Osgood, MD;  [facility];  Service: Plastics    MASTECTOMY Left 05/17/2019    MASTECTOMY W/ SENTINEL NODE BIOPSY Left 5/17/2019    Procedure: MASTECTOMY WITH BIOPSY LYMPH NODE SENTINEL; NUC MED 0730;  Surgeon: Veto Eisenmenger, MD;  [facility];  Service: Surgical Oncology    FL IMPLNT BIO IMPLNT FOR SOFT TISSUE REINFORCEMENT Left 5/17/2019    Procedure: RECONSTRUCTION BREAST W/ IMPLANT;  Surgeon: Drew Beltrán MD;  [facility];  Service: Plastics    [place] REVISION OF RECONSTRUCTED BREAST Bilateral 2020    Procedure: BREAST MOUND REVISION WITH;  Surgeon: Drew Beltrán MD;  [facility];  Service: Plastics    SINUS SURGERY      US GUIDANCE BREAST BIOPSY RIGHT EACH ADDITIONAL Right 2019    US GUIDED BREAST BIOPSY LEFT COMPLETE Left 2019    WISDOM TOOTH EXTRACTION      WISDOM TOOTH EXTRACTION       Family History   Problem Relation Age of Onset    Melanoma Maternal Grandfather 79    Leukemia Maternal Grandmother 80    Glaucoma Other     No Known Problems Mother     No Known Problems Father     Rectal cancer Maternal Aunt     No Known Problems Sister     No Known Problems Brother     No Known Problems Sister     No Known Problems Sister     Colon cancer Neg Hx     Colon polyps Neg Hx      Social History     Socioeconomic History    Marital status: /Civil Union     Spouse name: Not on file    Number of children: Not on file    Years of education: Not on file    Highest education level: Not on file   Occupational History    Occupation: Student/Patient Care assoc   Tobacco Use    Smoking status: Former Smoker     Years:      Quit date:      Years since quittin     Smokeless tobacco: Never Used    Tobacco comment: was a casual former smoker    Vaping Use    Vaping Use: Never used   Substance and Sexual Activity    Alcohol use: Yes     Comment: wine    Drug use: No    Sexual activity: Yes     Partners: Male     Birth control/protection: None   Other Topics Concern    Not on file   Social History Narrative    Pt's menstrual flow is regular lasting 5 days   Cycle is 30-31 days    Pt has had one pregnancy    Last PAP 10/2018    Last mammo was      Social Determinants of Health     Financial Resource Strain: Not on file   Food Insecurity: Not on file   Transportation Needs: Not on file   Physical Activity: Not on file   Stress: Not on file   Social Connections: Not on file   Intimate Partner Violence: Not on file   Housing Stability: Not on file       Current Outpatient Medications:     diazepam (VALIUM) 5 mg tablet, Take 1 tablet (5 mg total) by mouth every 6 (six) hours as needed for anxiety (Patient not taking: Reported on 7/14/2021), Disp: 15 tablet, Rfl: 0    ibuprofen (MOTRIN) 200 mg tablet, Take 200-800 mg by mouth every 6 (six) hours as needed for mild pain  (Patient not taking: Reported on 3/2/2022 ), Disp: , Rfl:   No Known Allergies    The following portions of the patient's history were reviewed and updated as appropriate: allergies, current medications, past family history, past medical history, past social history, past surgical history and problem list         Vitals:    03/02/22 0833   BP: 104/68   Pulse: 80   Resp: 16   Temp: 97 6 °F (36 4 °C)   SpO2: 100%       Physical Exam  Constitutional:       General: She is not in acute distress  Appearance: Normal appearance  She is well-developed  HENT:      Head: Normocephalic and atraumatic  Cardiovascular:      Heart sounds: Normal heart sounds  Pulmonary:      Breath sounds: Normal breath sounds  Chest:   Breasts:      Right: Skin change ( scar) present  No inverted nipple, mass, nipple discharge, tenderness, axillary adenopathy or supraclavicular adenopathy  Left: Skin change ( mastectomy scar with implant) present  No mass, tenderness, axillary adenopathy or supraclavicular adenopathy  Comments: Bilateral superficial nodularity some a which is visible on the right side likely secondary to prior fat grafting  Abdominal:      Palpations: Abdomen is soft  Lymphadenopathy:      Upper Body:      Right upper body: No supraclavicular, axillary or pectoral adenopathy  Left upper body: No supraclavicular, axillary or pectoral adenopathy     Neurological:      Mental Status: She is alert and oriented to person, place, and time    Psychiatric:         Mood and Affect: Mood normal            Results:  Labs:      Imaging  01/26/2022 bilateral breast MRI shows bilateral non mass enhancement for which bilateral ultrasound and right diagnostic mammogram were recommended    02/22/2022 right 3D diagnostic mammogram and bilateral ultrasound dense breast tissue, no specific areas of concern on the mammogram, bilateral ultrasound demonstrates innumerable cysts some of which are suggestive of fat necrosis, short-term follow-up MRI is recommended    I reviewed the above imaging data  Discussion/Summary:  74-year-old female status post left mastectomy for ductal carcinoma in Situ  She also had surgery on the right side  She has had bilateral fat grafting  She has noticed a change on the left where the area was larger and now broken into smaller areas  She is able to feel the nodularity on the right side  I am able to appreciate this as well  Many of these areas are very superficial in visible on the right  These are quite likely regions of fat necrosis secondary to the prior fat grafting  I did review her imaging with her  I am advising her to have a follow-up MRI in seven months as per the radiology recommendations  I will make these arrangements for her  I will see her back in roughly six months or sooner should the need arise

## 2022-06-23 ENCOUNTER — LAB (OUTPATIENT)
Dept: LAB | Facility: HOSPITAL | Age: 47
End: 2022-06-23

## 2022-06-23 DIAGNOSIS — Z00.8 HEALTH EXAMINATION IN POPULATION SURVEY: ICD-10-CM

## 2022-06-23 LAB
CHOLEST SERPL-MCNC: 178 MG/DL
EST. AVERAGE GLUCOSE BLD GHB EST-MCNC: 97 MG/DL
HBA1C MFR BLD: 5 %
HDLC SERPL-MCNC: 57 MG/DL
LDLC SERPL CALC-MCNC: 109 MG/DL (ref 0–100)
NONHDLC SERPL-MCNC: 121 MG/DL
TRIGL SERPL-MCNC: 58 MG/DL

## 2022-06-23 PROCEDURE — 80061 LIPID PANEL: CPT

## 2022-06-23 PROCEDURE — 83036 HEMOGLOBIN GLYCOSYLATED A1C: CPT

## 2022-06-23 PROCEDURE — 36415 COLL VENOUS BLD VENIPUNCTURE: CPT

## 2022-08-29 ENCOUNTER — TELEPHONE (OUTPATIENT)
Dept: SURGICAL ONCOLOGY | Facility: CLINIC | Age: 47
End: 2022-08-29

## 2022-08-29 NOTE — TELEPHONE ENCOUNTER
----- Message from Tex Weston MD sent at 8/29/2022 11:55 AM EDT -----  Regarding: FW: Breast MRI  She needs a short-term follow-up MRI secondary to the abnormalities seen on the prior MRI  If she bumps this out to November, we can probably accommodate an appointment following the MRI   ----- Message -----  From: Ashish Mackenzie  Sent: 8/29/2022  11:01 AM EDT  To: Arcadio Carranza RN, Tex Weston MD  Subject: Melonie Lopez: Breast MRI                                     ----- Message -----  From: Sari Crisostomo  Sent: 8/29/2022  10:34 AM EDT  To: Surgical Oncology Clinical  Subject: Breast MRI                                       Hi Dr Martha Garsia,  Im having some difficulties scheduled my breast MRI around my cycle and work schedule  Just wondering if   1- its absolutely necessary that I get another one since I just had one in January   2- must it been at a specific time during my cycle  3- If I schedule it for November, should I reschedule my October appointment with you until after the MRI  Quin Batista

## 2022-08-29 NOTE — TELEPHONE ENCOUNTER
Called and spoke with Lynne  Explained Dr Childs's recommendations  Rescheduled f/u appt with Dr Childs to 11/17 at 9:15am at [facility] office  She was agreeable  She will call and r/s MRI accordingly

## 2022-11-03 ENCOUNTER — HOSPITAL ENCOUNTER (OUTPATIENT)
Dept: MRI IMAGING | Facility: HOSPITAL | Age: 47
End: 2022-11-03
Attending: SURGERY

## 2022-11-03 DIAGNOSIS — R92.8 ABNORMAL MRI, BREAST: ICD-10-CM

## 2022-11-03 RX ADMIN — GADOBUTROL 6 ML: 604.72 INJECTION INTRAVENOUS at 09:53

## 2022-11-04 ENCOUNTER — TELEPHONE (OUTPATIENT)
Dept: SURGICAL ONCOLOGY | Facility: CLINIC | Age: 47
End: 2022-11-04

## 2022-11-04 DIAGNOSIS — R92.8 ABNORMAL MRI, BREAST: Primary | ICD-10-CM

## 2022-11-04 NOTE — TELEPHONE ENCOUNTER
----- Message from Desiree Mendes MD sent at 11/4/2022  4:01 PM EDT -----  She needs a right mammo and possible us based on her MRI  Orders are in

## 2022-11-04 NOTE — TELEPHONE ENCOUNTER
As per Dr Fidencio Zamudio, spoke with Lynne and let her know of the recommendation for a right breast mammogram and possible US based on her breast MRI findings  She understands and will schedule the apt herself  Contact information provided  She will try to get this scheduled before her appt with Dr Fidencio Zamudio

## 2022-11-15 ENCOUNTER — HOSPITAL ENCOUNTER (OUTPATIENT)
Dept: ULTRASOUND IMAGING | Facility: CLINIC | Age: 47
Discharge: HOME | End: 2022-11-15

## 2022-11-15 ENCOUNTER — HOSPITAL ENCOUNTER (OUTPATIENT)
Dept: MAMMOGRAPHY | Facility: CLINIC | Age: 47
Discharge: HOME | End: 2022-11-15

## 2022-11-15 VITALS — HEIGHT: 64 IN | BODY MASS INDEX: 22.53 KG/M2 | WEIGHT: 132 LBS

## 2022-11-15 DIAGNOSIS — R92.8 ABNORMAL MRI, BREAST: ICD-10-CM

## 2022-11-15 NOTE — PROGRESS NOTES
Met with patient and Dr Trend   regarding recommendation for;    __x___ RIGHT ______LEFT      _____Ultrasound guided  ____x__Stereotactic breast biopsy  __X___Verbalized understanding        Blood thinners:  No: _x____ Yes: ______ What:                 Biopsy teaching sheet given:  Yes: ___X___ No: ________    Pt given contact information and adv to call with any questions/needs

## 2022-11-16 ENCOUNTER — TELEPHONE (OUTPATIENT)
Dept: SURGICAL ONCOLOGY | Facility: CLINIC | Age: 47
End: 2022-11-16

## 2022-11-16 NOTE — TELEPHONE ENCOUNTER
Lynne had her right breast diagnostic imaging completed on 11/15/22  There is a recommendation for a right breast biopsy which is scheduled for 12/14/22  Attempted calling her to cancel her appt tomorrow with Dr Coni Galeana and R/S it for after the biopsy, no answer, left her a detailed message cancelling her appt and requested she return call to R/S her appt with Dr Coni Galeana

## 2022-11-18 ENCOUNTER — TELEPHONE (OUTPATIENT)
Dept: SURGICAL ONCOLOGY | Facility: CLINIC | Age: 47
End: 2022-11-18

## 2022-12-07 ENCOUNTER — TELEPHONE (OUTPATIENT)
Dept: SURGICAL ONCOLOGY | Facility: CLINIC | Age: 47
End: 2022-12-07

## 2022-12-07 NOTE — TELEPHONE ENCOUNTER
Spoke with Lynne today  Her right breast biopsy is scheduled for 12/14/22   Re-scheduled her appt with Dr Sulaiman Pena on 01/03/22 at 11:15 AM

## 2022-12-14 ENCOUNTER — HOSPITAL ENCOUNTER (OUTPATIENT)
Dept: MAMMOGRAPHY | Facility: CLINIC | Age: 47
Discharge: HOME | End: 2022-12-14

## 2022-12-14 VITALS
BODY MASS INDEX: 22.58 KG/M2 | HEIGHT: 64 IN | HEART RATE: 76 BPM | SYSTOLIC BLOOD PRESSURE: 102 MMHG | WEIGHT: 132.28 LBS | DIASTOLIC BLOOD PRESSURE: 60 MMHG

## 2022-12-14 DIAGNOSIS — R92.8 ABNORMAL MAMMOGRAM: ICD-10-CM

## 2022-12-14 RX ORDER — LIDOCAINE HYDROCHLORIDE 10 MG/ML
5 INJECTION, SOLUTION EPIDURAL; INFILTRATION; INTRACAUDAL; PERINEURAL ONCE
Status: COMPLETED | OUTPATIENT
Start: 2022-12-14 | End: 2022-12-14

## 2022-12-14 RX ORDER — LIDOCAINE HYDROCHLORIDE AND EPINEPHRINE BITARTRATE 20; .01 MG/ML; MG/ML
1 INJECTION, SOLUTION SUBCUTANEOUS ONCE
Status: COMPLETED | OUTPATIENT
Start: 2022-12-14 | End: 2022-12-14

## 2022-12-14 RX ADMIN — LIDOCAINE HYDROCHLORIDE AND EPINEPHRINE 1 ML: 20; 10 INJECTION, SOLUTION INFILTRATION; PERINEURAL at 09:43

## 2022-12-14 RX ADMIN — LIDOCAINE HYDROCHLORIDE 5 ML: 10 INJECTION, SOLUTION EPIDURAL; INFILTRATION; INTRACAUDAL; PERINEURAL at 09:43

## 2022-12-14 NOTE — DISCHARGE INSTR - OTHER ORDERS
POST LARGE CORE BREAST BIOPSY PATIENT INFORMATION      Place an ice pack inside your bra over the top of the dressing every hour for 20 minutes (20 minutes on, 60 minutes off)  Do this until bedtime  Do not shower or bathe until the following morning  After bathing, you may remove the bandaid  You may bathe your breast carefully with the steri-strips in place  Be careful not to loosen them  The steri-strips will fall off in 3-5 days  You may have mild discomfort, and you may have some bruising where the needle entered the skin  This should clear within 5-7 days  If you need medicine for discomfort, take acetaminophen products such as Tylenol  You may also take Advil or Motrin products  DO NOT use Aspirin for the first 24 hours  Do not participate in strenuous activities such as-tennis, aerobics, weight lifting, etc  for 24 hours  Refrain from swimming/soaking for 72 hours  Wearing a bra for sleeping may be more comfortable for the first 24-48 hours after your biopsy  Watch for continued bleeding, pain or fever over 101  If any of these symptoms occur, please contact our breast nurse navigator at the location where your biopsy was performed  During normal business hours (7:30am - 4:00pm) please call the nurse navigator at the site     where your procedure was performed:       [address]: [phone] or      [address]: [phone] or [phone]     [address]: [address] [facility]: [address]: [phone]        After 4pm - please call your physician or go to the nearest Emergency Department location  The final results of your biopsy are usually available within one week

## 2022-12-14 NOTE — PROGRESS NOTES
Procedure type:    _____ultrasound guided __X___stereotactic    Breast:    _____Left __X___Right    Location:    Needle: 8ga Revolve    # of passes: 1 core with calcs and 1 core without calcs    Clip: Jurgen    Performed by: Dr Roosevelt Santos held for 5 minutes by:  Albert Jefferson(PCA)    Brynn Aden Strips:    ___Xyes _____no    Band aid:    ___X__yes_____no    Tape and guaze:    _____yes ___X__no    Tolerated procedure:    __X___yes _____no

## 2022-12-14 NOTE — PROGRESS NOTES
Ice pack given:    ____X_yes _____no    Discharge instructions signed by patient:    ____X_yes _____no    Discharge instructions given to patient:    ___X__yes _____no    Discharged via:    ___X__ambulatory    _____wheelchair    _____stretcher    Stable on discharge:    ___X__yes ____no

## 2022-12-20 ENCOUNTER — TELEPHONE (OUTPATIENT)
Dept: SURGICAL ONCOLOGY | Facility: CLINIC | Age: 47
End: 2022-12-20

## 2022-12-20 NOTE — TELEPHONE ENCOUNTER
----- Message from Hansel Nageotte, MD sent at 12/20/2022  2:52 PM EST -----  Her biopsy is benign  Please call her  I will discuss at her upcoming appt

## 2022-12-20 NOTE — TELEPHONE ENCOUNTER
As per Dr Mya Montenegro, attempted calling Lynne to inform her of her benign breast biopsy results and Dr Samantha Hammer recommendation that she will discuss with her at her upcoming appt on 01/03/23, no answer via phone , left her a detailed message

## 2022-12-28 ENCOUNTER — TELEPHONE (OUTPATIENT)
Dept: SURGICAL ONCOLOGY | Facility: CLINIC | Age: 47
End: 2022-12-28

## 2022-12-28 NOTE — TELEPHONE ENCOUNTER
Called patient and made her aware that we needed to reschedule her appointment with Dr Alison Brennan on 1/3/2023 due to Dr Alison Brennan being in the OR that morning  Patient returned call to me once she had her calendar and rescheduled to 1/19/23 in Dr [facility] office

## 2023-01-19 ENCOUNTER — OFFICE VISIT (OUTPATIENT)
Dept: SURGICAL ONCOLOGY | Facility: CLINIC | Age: 48
End: 2023-01-19

## 2023-01-19 VITALS
SYSTOLIC BLOOD PRESSURE: 114 MMHG | OXYGEN SATURATION: 99 % | BODY MASS INDEX: 23.22 KG/M2 | TEMPERATURE: 97.2 F | DIASTOLIC BLOOD PRESSURE: 70 MMHG | HEIGHT: 64 IN | RESPIRATION RATE: 16 BRPM | HEART RATE: 76 BPM | WEIGHT: 136 LBS

## 2023-01-19 DIAGNOSIS — R92.8 ABNORMAL MAMMOGRAM OF RIGHT BREAST: ICD-10-CM

## 2023-01-19 DIAGNOSIS — R92.8 ABNORMAL MRI, BREAST: ICD-10-CM

## 2023-01-19 DIAGNOSIS — Z85.3 ENCOUNTER FOR FOLLOW-UP SURVEILLANCE OF BREAST CANCER: Primary | ICD-10-CM

## 2023-01-19 DIAGNOSIS — Z08 ENCOUNTER FOR FOLLOW-UP SURVEILLANCE OF BREAST CANCER: Primary | ICD-10-CM

## 2023-01-19 DIAGNOSIS — Z86.000 PERSONAL HISTORY OF IN-SITU NEOPLASM OF BREAST: ICD-10-CM

## 2023-01-19 DIAGNOSIS — R92.2 DENSE BREAST TISSUE: ICD-10-CM

## 2023-01-19 PROBLEM — N64.1: Status: RESOLVED | Noted: 2022-03-02 | Resolved: 2023-01-19

## 2023-01-19 NOTE — PROGRESS NOTES
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    Dense breast tissue    Abnormal MRI, breast    Abnormal mammogram of right breast  -     Mammo diagnostic right w 3d & cad; Future        Advance Care Planning/Advance Directives:  Discussed disease status, cancer treatment plans and/or cancer treatment goals with the patient       Oncology History:    Oncology History   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   2/20/2019 Biopsy    Left breast biopsy 12:00 [facility]     DCIS  ER/%     3/6/2019 Genetic Testing    Integrated [employer] with [employer] Hereditary Cancer  Update Test-    APC, LIBERTY, AXIN2, BARD1, BMPR1A, BRCA1, BRCA2, BRIP1, CDH1, CDK4, CDKN2A, CHEK2, EPCAM (large rearrangment only), HOXB13 (sequencing only), GALNT12, MLH1, MSH2, MSH3 (excluding repetitive portions of exon 1), MSH6, MUTYH, NBN, NTHL1, PALB2, PMS2, PTEN, RAD51C, RAD51D, RNF43, RPS20, SMAD4, STK11, TP53, Sequencing was performed for select regions of POLE and POLD1, and large rearrangement analysis was performed for select regions of GREM1         Negative     5/29/2019 -  Cancer Staged    Staging form: Breast, AJCC 8th Edition  - Pathologic: Stage 0 (pTis (DCIS), pN0(sn), cM0, G2, ER+, WY+, HER2: Not Assessed) - Signed by Landon Sheldon MD on 5/29/2019  Neoadjuvant therapy: No  Laterality: Left  Method of lymph node assessment: [place] lymph node biopsy  Histologic grading system: 3 grade system           History of Present Illness: Breast cancer follow-up, reports some occasional itching but no other concerns  -Interval History: Abnormal MRI followed by diagnostic mammogram and biopsy    Review of Systems:  Review of Systems   Constitutional: Negative  Negative for appetite change and fever  Eyes: Negative  Respiratory: Negative for shortness of breath  Cardiovascular: Negative  Gastrointestinal: Negative  Endocrine: Negative  Genitourinary: Negative  Musculoskeletal: Negative  Negative for arthralgias and myalgias  Skin:        Occasional itching   Allergic/Immunologic: Negative  Neurological: Negative  Hematological: Negative  Negative for adenopathy  Does not bruise/bleed easily  Psychiatric/Behavioral: Negative          Patient Active Problem List   Diagnosis   • 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   • Dense breast tissue   • Laryngopharyngeal reflux (LPR)   • Dizziness   • DNS (deviated nasal septum)   • Low libido   • Dyspareunia, female   • Secondary female infertility   • Breast cancer screening, high risk patient   • Capsular contracture of breast implant   • Encounter for follow-up surveillance of breast cancer   • Anesthesia   • PONV (postoperative nausea and vomiting)   • Dental crown present   • Deformity and disproportion of reconstructed breast   • RLQ abdominal pain   • Rectal discomfort   • Epigastric pressure   • Dysphagia   • Endometrioma of ovary   • Grade I hemorrhoids   • Abnormal MRI, breast   • Abnormal mammogram of right breast     Past Medical History:   Diagnosis Date   • Anemia     slight   • Anesthesia     "after deviated septum surgery area just behind front teeth(incisive papilla) became very inflamed and sore/required prednisone"   • Anesthesia complication     teeth chattering so much pt chipped tooth   • Anxiety    • Balance problem     "when experiencing dizziness" "from vertigo"   • Deformity of reconstructed breast     left breast "some pockets"   • Dental crown present    • Dental crown present    • Difficulty swallowing     "at times"   • Dizzy spells     "working with PCP"   • Ear problem     "damage noted unknown cause, Right"   • Fatigue    • GERD (gastroesophageal reflux disease)    • Limb alert care status     No BP/IV left side   • Motion sickness    • Nausea     "with dizziness"   • Night sweat     occ   • Palpitations    • PONV (postoperative nausea and vomiting)     "had scop patch last surgery was helpful"   • Seasonal allergies    • Shortness of breath     "randomly"   • Tinnitus     occas     Past Surgical History:   Procedure Laterality Date   • AUGMENTATION BREAST Right 12/18/2019    Procedure: AUGMENTATION BREAST;  Surgeon: Blayne Ferro MD;  [facility];  Service: Plastics   • AUGMENTATION MAMMAPLASTY Bilateral 12/18/2019   • BREAST BIOPSY Bilateral 02/20/2019    rt- fibroadenoma, lt- br ca   • BREAST RECONSTRUCTION Left 12/18/2019    Procedure: RECONSTRUCTION BREAST W/ IMPLANT;  Surgeon: Blayne Ferro MD;  [facility];  Service: Plastics   • CAPSULOTOMY Left 12/18/2019    Procedure: CAPSULECTOMY;  Surgeon: Blayne Ferro MD;  [facility];  Service: Plastics   • COLONOSCOPY     • COLPOSCOPY  2000   • INSERTION OF BREAST TISSUE EXPANDER Left 5/17/2019    Procedure: INSERTION/PLACEMENT TISSUE EXPANDER (EXCHANGE);   Surgeon: Blayne Ferro MD;  [facility];  Service: Plastics   • LEG SURGERY Left     cyst removed from leg   • LIPOSUCTION W/ FAT INJECTION Bilateral 8/19/2020    Procedure: MOUND FAT GRAFTING;  Surgeon: Blayne Ferro MD;  [facility];  Service: Plastics   • MAMMO STEREOTACTIC BREAST BIOPSY RIGHT (ALL INC) Right 12/14/2022   • MASTECTOMY Left 05/17/2019   • MASTECTOMY W/ SENTINEL NODE BIOPSY Left 5/17/2019    Procedure: MASTECTOMY WITH BIOPSY LYMPH NODE SENTINEL; [facility] MED 0730;  Surgeon: Maximino Bach MD;  [facility];  Service: Surgical Oncology   • MT IMPLNT BIO IMPLNT FOR SOFT TISSUE REINFORCEMENT Left 2019    Procedure: RECONSTRUCTION BREAST W/ IMPLANT;  Surgeon: Ankit Cortez MD;  [facility];  Service: Plastics   • MO REVISION OF RECONSTRUCTED BREAST Bilateral 2020    Procedure: BREAST MOUND REVISION WITH;  Surgeon: Ankit Cortez MD;  [facility];  Service: Plastics   • SINUS SURGERY     • US GUIDANCE BREAST BIOPSY RIGHT EACH ADDITIONAL Right 2019   • US GUIDED BREAST BIOPSY LEFT COMPLETE Left 2019   • WISDOM TOOTH EXTRACTION     • WISDOM TOOTH EXTRACTION       Family History   Problem Relation Age of Onset   • No Known Problems Mother    • No Known Problems Father    • No Known Problems Sister    • No Known Problems Sister    • No Known Problems Sister    • No Known Problems Daughter    • Leukemia Maternal Grandmother 80   • Melanoma Maternal Grandfather 79   • No Known Problems Paternal Grandmother    • No Known Problems Paternal Grandfather    • No Known Problems Brother    • Rectal cancer Maternal Aunt    • No Known Problems Maternal Aunt    • No Known Problems Paternal Aunt    • No Known Problems Paternal Aunt    • No Known Problems Paternal Aunt    • No Known Problems Paternal Aunt    • No Known Problems Paternal Aunt    • Glaucoma Other    • Colon cancer Neg Hx    • Colon polyps Neg Hx      Social History     Socioeconomic History   • Marital status: /Civil Union     Spouse name: Not on file   • Number of children: Not on file   • Years of education: Not on file   • Highest education level: Not on file   Occupational History   • Occupation: Student/Patient Care assoc   Tobacco Use   • Smoking status: Former     Years: 7      Types: Cigarettes     Quit date:      Years since quittin 0   • Smokeless tobacco: Never   • Tobacco comments:     was a casual former smoker    Vaping Use   • Vaping Use: Never used   Substance and Sexual Activity   • Alcohol use: Yes     Comment: wine   • Drug use: No   • Sexual activity: Yes     Partners: Male     Birth control/protection: None   Other Topics Concern   • Not on file   Social History Narrative    Pt's menstrual flow is regular lasting 5 days  Cycle is 30-31 days    Pt has had one pregnancy    Last PAP 10/2018    Last mammo was 1/19     Social Determinants of Health     Financial Resource Strain: Not on file   Food Insecurity: Not on file   Transportation Needs: Not on file   Physical Activity: Not on file   Stress: Not on file   Social Connections: Not on file   Intimate Partner Violence: Not on file   Housing Stability: Not on file     No current outpatient medications on file  No Known Allergies    The following portions of the patient's history were reviewed and updated as appropriate: allergies, current medications, past family history, past medical history, past social history, past surgical history and problem list         Vitals:    01/19/23 1053   BP: 114/70   Pulse: 76   Resp: 16   Temp: (!) 97 2 °F (36 2 °C)   SpO2: 99%       Physical Exam  Constitutional:       General: She is not in acute distress  Appearance: Normal appearance  She is well-developed  HENT:      Head: Normocephalic and atraumatic  Cardiovascular:      Heart sounds: Normal heart sounds  Pulmonary:      Breath sounds: Normal breath sounds  Chest:   Breasts:     Right: Skin change ( Healing biopsy site) present  No inverted nipple, mass, nipple discharge or tenderness  Left: Skin change ( Mastectomy scar with implant) present  No mass or tenderness  Abdominal:      Palpations: Abdomen is soft  Lymphadenopathy:      Upper Body:      Right upper body: No supraclavicular, axillary or pectoral adenopathy  Left upper body: No supraclavicular, axillary or pectoral adenopathy  Neurological:      Mental Status: She is alert and oriented to person, place, and time     Psychiatric:         Mood and Affect: Mood normal            Results:  Labs:  12/14/2022 stereo tactic biopsy right breast showed histiocytic aggregates with calcification as well as pseudo angiomatous stromal hyperplasia    Imaging  11/3/2022 bilateral breast MRI shows resolution of the prior enhancement noted in the left breast, patchy foci of non-mass enhancement in the right breast for which diagnostic mammogram was recommended    11/15/2022 right diagnostic mammogram and ultrasound shows extremely dense breast tissue as well as increase in the calcifications predominantly in the upper outer location, no sonographic correlates to the calcification, multiple simple and complicated cysts were noted, stereotactic biopsy was recommended as well as follow-up mammogram and MRI    12/14/2022 postbiopsy mammogram was concordant    I reviewed the above laboratory and imaging data  Discussion/Summary: 72-year-old female status post left mastectomy and reconstruction for ductal carcinoma in situ  She had a contralateral augmentation  She has had fat grafting performed  Her recent breast MRI showed resolution of the changes in the left breast but an area of concern on the right side  This prompted diagnostic mammogram and ultrasound  She then had a stereotactic biopsy of the right breast revealing benign and concordant findings  There are no concerns on exam today  I will order a right-sided mammogram for May which is 6 months off of the most recent diagnostic mammogram   We discussed continuing MRI this year as well  She would like to potentially discontinue the MRI  If the MRI this coming November is benign, we can either discontinue it altogether or alter the frequency  I will see her again in 6 months or sooner should the need arise

## 2023-02-24 ENCOUNTER — OFFICE VISIT (OUTPATIENT)
Dept: FAMILY MEDICINE CLINIC | Facility: HOSPITAL | Age: 48
End: 2023-02-24

## 2023-02-24 VITALS
WEIGHT: 135 LBS | HEART RATE: 88 BPM | DIASTOLIC BLOOD PRESSURE: 70 MMHG | SYSTOLIC BLOOD PRESSURE: 102 MMHG | TEMPERATURE: 97.4 F | BODY MASS INDEX: 23.05 KG/M2 | OXYGEN SATURATION: 98 % | HEIGHT: 64 IN

## 2023-02-24 DIAGNOSIS — Z11.4 ENCOUNTER FOR SCREENING FOR HIV: ICD-10-CM

## 2023-02-24 DIAGNOSIS — Z00.00 ANNUAL PHYSICAL EXAM: Primary | ICD-10-CM

## 2023-02-24 DIAGNOSIS — R06.09 DYSPNEA ON EXERTION: ICD-10-CM

## 2023-02-24 DIAGNOSIS — Z23 ENCOUNTER FOR IMMUNIZATION: ICD-10-CM

## 2023-02-24 DIAGNOSIS — M25.60 MULTIPLE STIFF JOINTS: ICD-10-CM

## 2023-02-24 DIAGNOSIS — R00.0 TACHYCARDIA: ICD-10-CM

## 2023-02-24 DIAGNOSIS — Z86.000 PERSONAL HISTORY OF IN-SITU NEOPLASM OF BREAST: ICD-10-CM

## 2023-02-24 PROBLEM — K62.89 RECTAL DISCOMFORT: Status: RESOLVED | Noted: 2021-03-16 | Resolved: 2023-02-24

## 2023-02-24 PROBLEM — R10.31 RLQ ABDOMINAL PAIN: Status: RESOLVED | Noted: 2021-03-16 | Resolved: 2023-02-24

## 2023-02-24 NOTE — PROGRESS NOTES
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     Clinically stable as managed by surgical oncology  Maintain f/u as planned        Other Visit Diagnoses     Annual physical exam    -  Primary    PE updated, next due in 1 year; adacel given; update gyn exam/pap smear with gyn; colonoscopy UTD    Encounter for immunization        Relevant Orders    TDAP VACCINE GREATER THAN OR EQUAL TO 8YO IM (Completed)    Dyspnea on exertion        given FH early CAD, will eval further w/stress echo & holter monitor - will determine further plan pending results    Relevant Orders    Holter monitor    Echo stress test, exercise    Multiple stiff joints        evaluate further w/arthritic labs as ordered - determine further plan pending results    Relevant Orders    LORENZO w/Reflex if Positive    Rheumatoid Arthritis Factor    C-reactive protein    CBC and differential    Comprehensive metabolic panel    TSH, 3rd generation with Free T4 reflex    Tachycardia        orthostatic/exertional episodes - will eval further w/stress echo & holter monitor - determine further plan pending results    Relevant Orders    Holter monitor    CBC and differential    Comprehensive metabolic panel    TSH, 3rd generation with Free T4 reflex    Echo stress test, exercise    Encounter for screening for HIV        Relevant Orders    : HIV 1/2 AB/AG w Reflex [facility] for 2 yr old and above          Immunizations and preventive care screenings were discussed with patient today  Appropriate education was printed on patient's after visit summary      Counseling:  Alcohol/drug use: discussed moderation in alcohol intake, the recommendations for healthy alcohol use, and avoidance of illicit drug use  Dental Health: discussed importance of regular tooth brushing, flossing, and dental visits  Sexual health: discussed sexually transmitted diseases, partner selection, use of condoms, avoidance of unintended pregnancy, and contraceptive alternatives  · Exercise: the importance of regular exercise/physical activity was discussed  Recommend exercise 3-5 times per week for at least 30 minutes  Depression Screening and Follow-up Plan: Patient was screened for depression during today's encounter  They screened negative with a PHQ-2 score of 0  Return in about 1 year (around 2/24/2024) for Annual physical      Chief Complaint:     Chief Complaint   Patient presents with   • Annual Exam   • Establish Care      History of Present Illness:     Adult Annual Physical   Patient here for a comprehensive physical exam  The patient reports problems - per ROS  PMH: breast cancer - diagnosed 2019 (BRCA neg), endometrioma right ovary - followed w/gyn, having pelvic US  PSH: left mastectomy    Diet and Physical Activity  · Diet/Nutrition: well balanced diet and limited junk food  · Exercise: 5-7 times a week on average  Depression Screening  PHQ-2/9 Depression Screening    Little interest or pleasure in doing things: 0 - not at all  Feeling down, depressed, or hopeless: 0 - not at all  PHQ-2 Score: 0  PHQ-2 Interpretation: Negative depression screen       General Health  · Sleep: sleeps well  · Hearing: normal - bilateral   · Vision: vision problems: close up vision poor, most recent eye exam >1 year ago and wears glasses  · Dental: regular dental visits  /GYN Health  · Patient is: premenopausal     Review of Systems:       Review of Systems   Constitutional: Negative  HENT: Positive for ear pain (right ear "muffled", chronic (had improved w/course of keflex s/p mastectomy in 2019))  Negative for ear discharge, hearing loss, sore throat and trouble swallowing  Eyes: Negative  Respiratory: Positive for chest tightness (at xiphoid process, she attributes to working out or anxiety) and shortness of breath (occ BELLA w/light activity (ie, steps, warm up of work out) - brief w/o other symptoms)  Cardiovascular: Positive for palpitations (higher heart rate noted w/exertion (ie, steps, warm up of work out) - episodes are brief w/o other sx's)  Negative for chest pain  Gastrointestinal: Positive for abdominal pain (occ epigastric/RUQ, she attributes to poorer diet choices; fleeting episodes)  Negative for blood in stool, constipation, diarrhea, nausea and vomiting  Genitourinary: Negative  Musculoskeletal: Positive for arthralgias (stiff hand, primarily left ring finger, worse in winter)  Neurological: Negative  Psychiatric/Behavioral: Negative for dysphoric mood           Past Medical History:     Past Medical History:   Diagnosis Date   • Anemia     slight   • Anesthesia     "after deviated septum surgery area just behind front teeth(incisive papilla) became very inflamed and sore/required prednisone"   • Anesthesia complication     teeth chattering so much pt chipped tooth   • Anxiety    • Balance problem     "when experiencing dizziness" "from vertigo"   • Deformity of reconstructed breast     left breast "some pockets"   • Dental crown present    • Dental crown present    • Difficulty swallowing     "at times"   • Dizzy spells     "working with PCP"   • Ear problem     "damage noted unknown cause, Right"   • Fatigue    • GERD (gastroesophageal reflux disease)    • Limb alert care status     No BP/IV left side   • Motion sickness    • Nausea     "with dizziness"   • Night sweat     occ   • Palpitations    • PONV (postoperative nausea and vomiting)     "had scop patch last surgery was helpful"   • Rectal discomfort 3/16/2021   • RLQ abdominal pain 3/16/2021   • Seasonal allergies    • Shortness of breath     "randomly"   • Tinnitus     occas      Past Surgical History:     Past Surgical History:   Procedure Laterality Date   • AUGMENTATION BREAST Right 12/18/2019    Procedure: AUGMENTATION BREAST;  Surgeon: Charles Concepcion MD;  [facility];  Service: Plastics   • AUGMENTATION MAMMAPLASTY Bilateral 12/18/2019   • BREAST BIOPSY Bilateral 02/20/2019    rt- fibroadenoma, lt- br ca   • BREAST RECONSTRUCTION Left 12/18/2019    Procedure: RECONSTRUCTION BREAST W/ IMPLANT;  Surgeon: Charles Concepcion MD;  [facility];  Service: Plastics   • CAPSULOTOMY Left 12/18/2019    Procedure: CAPSULECTOMY;  Surgeon: Charles Concepcion MD;  [facility];  Service: Plastics   • COLONOSCOPY     • COLPOSCOPY  2000   • INSERTION OF BREAST TISSUE EXPANDER Left 5/17/2019    Procedure: INSERTION/PLACEMENT TISSUE EXPANDER (EXCHANGE);   Surgeon: Charles Concepcion MD;  [facility];  Service: Plastics   • LEG SURGERY Left     cyst removed from leg   • LIPOSUCTION W/ FAT INJECTION Bilateral 8/19/2020    Procedure: MOUND FAT GRAFTING;  Surgeon: Charles Concepcion MD;  [facility];  Service: Plastics   • MAMMO STEREOTACTIC BREAST BIOPSY RIGHT (ALL INC) Right 12/14/2022   • MASTECTOMY Left 05/17/2019   • MASTECTOMY W/ SENTINEL NODE BIOPSY Left 5/17/2019    Procedure: MASTECTOMY WITH BIOPSY LYMPH NODE SENTINEL; [facility] MED 0730;  Surgeon: Eligio Rodríguez MD;  [facility];  Service: Surgical Oncology   • AR IMPLNT BIO IMPLNT FOR SOFT TISSUE REINFORCEMENT Left 5/17/2019    Procedure: RECONSTRUCTION BREAST W/ IMPLANT;  Surgeon: Charles Concepcion MD;  [facility];  Service: Plastics   • AR REVISION OF RECONSTRUCTED BREAST Bilateral 8/19/2020    Procedure: BREAST MOUND REVISION WITH;  Surgeon: Charles Concepcion MD;  [facility];  Service: Plastics   • SINUS SURGERY     • US GUIDANCE BREAST BIOPSY RIGHT EACH ADDITIONAL Right 2/20/2019   • US GUIDED BREAST BIOPSY LEFT COMPLETE Left 2/20/2019   • WISDOM TOOTH EXTRACTION     • WISDOM TOOTH EXTRACTION        Social History:     Social History Socioeconomic History   • Marital status: /Civil Union     Spouse name: None   • Number of children: None   • Years of education: None   • Highest education level: None   Occupational History   • Occupation: Student/Patient Care assoc   Tobacco Use   • Smoking status: Former     Years:      Types: Cigarettes     Quit date:      Years since quittin    • Smokeless tobacco: Never   • Tobacco comments:     was a casual former smoker    Vaping Use   • Vaping Use: Never used   Substance and Sexual Activity   • Alcohol use: Yes     Comment: wine   • Drug use: No   • Sexual activity: Yes     Partners: Male     Birth control/protection: None   Other Topics Concern   • None   Social History Narrative    Pt's menstrual flow is regular lasting 5 days   Cycle is 30-31 days    Pt has had one pregnancy    Last PAP 10/2018    Last mammo was      Social Determinants of Health     Financial Resource Strain: Not on file   Food Insecurity: Not on file   Transportation Needs: Not on file   Physical Activity: Not on file   Stress: Not on file   Social Connections: Not on file   Intimate Partner Violence: Not on file   Housing Stability: Not on file      Family History:     Family History   Problem Relation Age of Onset   • No Known Problems Mother    • No Known Problems Father    • No Known Problems Sister    • No Known Problems Sister    • No Known Problems Sister    • No Known Problems Brother    • Leukemia Maternal Grandmother 80   • Melanoma Maternal Grandfather 79   • No Known Problems Paternal Grandmother    • No Known Problems Paternal Grandfather    • Rectal cancer Maternal Aunt    • No Known Problems Maternal Aunt    • No Known Problems Paternal Aunt    • No Known Problems Paternal Aunt    • No Known Problems Paternal Aunt    • No Known Problems Paternal Aunt    • No Known Problems Paternal Aunt    • Coronary artery disease Cousin    • Glaucoma Other    • Colon cancer Neg Hx    • Colon polyps Neg Hx Current Medications:     No current outpatient medications on file  No current facility-administered medications for this visit  Allergies:     No Known Allergies   Physical Exam:     /70 (Patient Position: Sitting, Cuff Size: Standard)   Pulse 88   Temp (!) 97 4 °F (36 3 °C) (Tympanic)   Ht 5' 4" (1 626 m)   Wt 61 2 kg (135 lb)   SpO2 98%   BMI 23 17 kg/m²       Physical Exam  Vitals reviewed  Constitutional:       General: She is not in acute distress  Appearance: Normal appearance  She is normal weight  HENT:      Head: Normocephalic and atraumatic  Right Ear: Tympanic membrane, ear canal and external ear normal       Left Ear: Tympanic membrane, ear canal and external ear normal       Nose: Nose normal       Mouth/Throat:      Mouth: Mucous membranes are moist       Pharynx: Oropharynx is clear  Eyes:      General: No scleral icterus  Extraocular Movements: Extraocular movements intact  Conjunctiva/sclera: Conjunctivae normal       Pupils: Pupils are equal, round, and reactive to light  Cardiovascular:      Rate and Rhythm: Normal rate and regular rhythm  Heart sounds: Normal heart sounds  No murmur heard  Pulmonary:      Effort: Pulmonary effort is normal  No respiratory distress  Breath sounds: Normal breath sounds  No wheezing  Abdominal:      General: Abdomen is flat  Bowel sounds are normal       Palpations: Abdomen is soft  Tenderness: There is abdominal tenderness (mild tenderness midepigastric region)  Musculoskeletal:         General: Normal range of motion  Cervical back: Normal range of motion  No tenderness  Right lower leg: No edema  Left lower leg: No edema  Lymphadenopathy:      Cervical: No cervical adenopathy  Skin:     General: Skin is warm and dry  Neurological:      General: No focal deficit present  Mental Status: She is alert and oriented to person, place, and time  Cranial Nerves:  No cranial nerve deficit  Psychiatric:         Mood and Affect: Mood normal          Behavior: Behavior normal          Thought Content:  Thought content normal          Judgment: Judgment normal           Mumtaz Krupp, CRNP  [phone]

## 2023-03-01 ENCOUNTER — TELEPHONE (OUTPATIENT)
Dept: FAMILY MEDICINE CLINIC | Facility: HOSPITAL | Age: 48
End: 2023-03-01

## 2023-05-26 ENCOUNTER — APPOINTMENT (OUTPATIENT)
Dept: LAB | Facility: HOSPITAL | Age: 48
End: 2023-05-26

## 2023-05-26 DIAGNOSIS — Z00.8 HEALTH EXAMINATION IN POPULATION SURVEY: ICD-10-CM

## 2023-05-26 DIAGNOSIS — Z11.4 ENCOUNTER FOR SCREENING FOR HIV: ICD-10-CM

## 2023-05-26 DIAGNOSIS — R00.0 TACHYCARDIA: ICD-10-CM

## 2023-05-26 DIAGNOSIS — M25.60 MULTIPLE STIFF JOINTS: ICD-10-CM

## 2023-05-26 LAB
ALBUMIN SERPL BCP-MCNC: 3.9 G/DL (ref 3.5–5)
ALP SERPL-CCNC: 49 U/L (ref 46–116)
ALT SERPL W P-5'-P-CCNC: 20 U/L (ref 12–78)
ANA SER QL IA: NEGATIVE
ANION GAP SERPL CALCULATED.3IONS-SCNC: 1 MMOL/L (ref 4–13)
AST SERPL W P-5'-P-CCNC: 17 U/L (ref 5–45)
BASOPHILS # BLD AUTO: 0.03 THOUSANDS/ÂΜL (ref 0–0.1)
BASOPHILS NFR BLD AUTO: 1 % (ref 0–1)
BILIRUB SERPL-MCNC: 0.87 MG/DL (ref 0.2–1)
BUN SERPL-MCNC: 19 MG/DL (ref 5–25)
CALCIUM SERPL-MCNC: 9.3 MG/DL (ref 8.3–10.1)
CHLORIDE SERPL-SCNC: 106 MMOL/L (ref 96–108)
CHOLEST SERPL-MCNC: 179 MG/DL
CO2 SERPL-SCNC: 27 MMOL/L (ref 21–32)
CREAT SERPL-MCNC: 0.85 MG/DL (ref 0.6–1.3)
CRP SERPL QL: <3 MG/L
EOSINOPHIL # BLD AUTO: 0.06 THOUSAND/ÂΜL (ref 0–0.61)
EOSINOPHIL NFR BLD AUTO: 1 % (ref 0–6)
ERYTHROCYTE [DISTWIDTH] IN BLOOD BY AUTOMATED COUNT: 12.4 % (ref 11.6–15.1)
EST. AVERAGE GLUCOSE BLD GHB EST-MCNC: 97 MG/DL
GFR SERPL CREATININE-BSD FRML MDRD: 83 ML/MIN/1.73SQ M
GLUCOSE P FAST SERPL-MCNC: 86 MG/DL (ref 65–99)
HBA1C MFR BLD: 5 %
HCT VFR BLD AUTO: 38.3 % (ref 34.8–46.1)
HDLC SERPL-MCNC: 66 MG/DL
HGB BLD-MCNC: 12.6 G/DL (ref 11.5–15.4)
HIV 1+2 AB+HIV1 P24 AG SERPL QL IA: NORMAL
HIV 2 AB SERPL QL IA: NORMAL
HIV1 AB SERPL QL IA: NORMAL
HIV1 P24 AG SERPL QL IA: NORMAL
IMM GRANULOCYTES # BLD AUTO: 0.01 THOUSAND/UL (ref 0–0.2)
IMM GRANULOCYTES NFR BLD AUTO: 0 % (ref 0–2)
LDLC SERPL CALC-MCNC: 96 MG/DL (ref 0–100)
LYMPHOCYTES # BLD AUTO: 1.65 THOUSANDS/ÂΜL (ref 0.6–4.47)
LYMPHOCYTES NFR BLD AUTO: 27 % (ref 14–44)
MCH RBC QN AUTO: 28.5 PG (ref 26.8–34.3)
MCHC RBC AUTO-ENTMCNC: 32.9 G/DL (ref 31.4–37.4)
MCV RBC AUTO: 87 FL (ref 82–98)
MONOCYTES # BLD AUTO: 0.3 THOUSAND/ÂΜL (ref 0.17–1.22)
MONOCYTES NFR BLD AUTO: 5 % (ref 4–12)
NEUTROPHILS # BLD AUTO: 4.17 THOUSANDS/ÂΜL (ref 1.85–7.62)
NEUTS SEG NFR BLD AUTO: 66 % (ref 43–75)
NONHDLC SERPL-MCNC: 113 MG/DL
NRBC BLD AUTO-RTO: 0 /100 WBCS
PLATELET # BLD AUTO: 298 THOUSANDS/UL (ref 149–390)
PMV BLD AUTO: 10.8 FL (ref 8.9–12.7)
POTASSIUM SERPL-SCNC: 4.8 MMOL/L (ref 3.5–5.3)
PROT SERPL-MCNC: 7.6 G/DL (ref 6.4–8.4)
RBC # BLD AUTO: 4.42 MILLION/UL (ref 3.81–5.12)
SODIUM SERPL-SCNC: 134 MMOL/L (ref 135–147)
TRIGL SERPL-MCNC: 84 MG/DL
TSH SERPL DL<=0.05 MIU/L-ACNC: 2.15 UIU/ML (ref 0.45–4.5)
WBC # BLD AUTO: 6.22 THOUSAND/UL (ref 4.31–10.16)

## 2023-05-27 LAB — RHEUMATOID FACT SER QL LA: NEGATIVE

## 2023-06-19 DIAGNOSIS — Z20.1 EXPOSURE TO MYCOBACTERIUM TUBERCULOSIS: Primary | ICD-10-CM

## 2023-07-14 ENCOUNTER — HOSPITAL ENCOUNTER (OUTPATIENT)
Dept: MAMMOGRAPHY | Facility: CLINIC | Age: 48
Discharge: HOME | End: 2023-07-14
Payer: COMMERCIAL

## 2023-07-14 VITALS — BODY MASS INDEX: 23.05 KG/M2 | WEIGHT: 135 LBS | HEIGHT: 64 IN

## 2023-07-14 DIAGNOSIS — R92.8 ABNORMAL MAMMOGRAM OF RIGHT BREAST: ICD-10-CM

## 2023-07-14 PROCEDURE — 77065 DX MAMMO INCL CAD UNI: CPT

## 2023-07-14 PROCEDURE — G0279 TOMOSYNTHESIS, MAMMO: HCPCS

## 2023-08-08 ENCOUNTER — ANNUAL EXAM (OUTPATIENT)
Dept: GYNECOLOGY | Facility: CLINIC | Age: 48
End: 2023-08-08
Payer: COMMERCIAL

## 2023-08-08 VITALS
DIASTOLIC BLOOD PRESSURE: 60 MMHG | SYSTOLIC BLOOD PRESSURE: 92 MMHG | WEIGHT: 137.4 LBS | BODY MASS INDEX: 23.46 KG/M2 | HEIGHT: 64 IN

## 2023-08-08 DIAGNOSIS — N83.201 CYST OF RIGHT OVARY: ICD-10-CM

## 2023-08-08 DIAGNOSIS — Z11.51 SCREENING FOR HPV (HUMAN PAPILLOMAVIRUS): ICD-10-CM

## 2023-08-08 DIAGNOSIS — Z01.419 ENCOUNTER FOR GYNECOLOGICAL EXAMINATION WITHOUT ABNORMAL FINDING: Primary | ICD-10-CM

## 2023-08-08 DIAGNOSIS — N92.0 MENORRHAGIA WITH REGULAR CYCLE: ICD-10-CM

## 2023-08-08 PROCEDURE — G0145 SCR C/V CYTO,THINLAYER,RESCR: HCPCS | Performed by: OBSTETRICS & GYNECOLOGY

## 2023-08-08 PROCEDURE — S0612 ANNUAL GYNECOLOGICAL EXAMINA: HCPCS | Performed by: OBSTETRICS & GYNECOLOGY

## 2023-08-08 PROCEDURE — G0476 HPV COMBO ASSAY CA SCREEN: HCPCS | Performed by: OBSTETRICS & GYNECOLOGY

## 2023-08-18 LAB
LAB AP GYN PRIMARY INTERPRETATION: NORMAL
LAB AP LMP: NORMAL
Lab: NORMAL

## 2023-08-23 ENCOUNTER — HOSPITAL ENCOUNTER (OUTPATIENT)
Dept: ULTRASOUND IMAGING | Facility: HOSPITAL | Age: 48
Discharge: HOME | End: 2023-08-23
Attending: OBSTETRICS & GYNECOLOGY
Payer: COMMERCIAL

## 2023-08-23 DIAGNOSIS — N83.201 CYST OF RIGHT OVARY: ICD-10-CM

## 2023-08-23 PROCEDURE — 76830 TRANSVAGINAL US NON-OB: CPT

## 2023-08-23 PROCEDURE — 76856 US EXAM PELVIC COMPLETE: CPT

## 2023-10-25 ENCOUNTER — CLINICAL SUPPORT (OUTPATIENT)
Dept: FAMILY MEDICINE CLINIC | Facility: HOSPITAL | Age: 48
End: 2023-10-25
Payer: COMMERCIAL

## 2023-10-25 DIAGNOSIS — Z23 ENCOUNTER FOR IMMUNIZATION: Primary | ICD-10-CM

## 2023-10-25 PROCEDURE — 90471 IMMUNIZATION ADMIN: CPT

## 2023-10-25 PROCEDURE — 90686 IIV4 VACC NO PRSV 0.5 ML IM: CPT

## 2023-12-23 ENCOUNTER — OFFICE VISIT (OUTPATIENT)
Dept: URGENT CARE | Facility: CLINIC | Age: 48
End: 2023-12-23
Payer: COMMERCIAL

## 2023-12-23 VITALS
OXYGEN SATURATION: 98 % | RESPIRATION RATE: 18 BRPM | SYSTOLIC BLOOD PRESSURE: 111 MMHG | HEART RATE: 79 BPM | TEMPERATURE: 97.7 F | DIASTOLIC BLOOD PRESSURE: 76 MMHG

## 2023-12-23 DIAGNOSIS — R39.9 UTI SYMPTOMS: ICD-10-CM

## 2023-12-23 DIAGNOSIS — N30.01 ACUTE CYSTITIS WITH HEMATURIA: Primary | ICD-10-CM

## 2023-12-23 LAB
SL AMB  POCT GLUCOSE, UA: NEGATIVE
SL AMB LEUKOCYTE ESTERASE,UA: ABNORMAL
SL AMB POCT BILIRUBIN,UA: ABNORMAL
SL AMB POCT BLOOD,UA: ABNORMAL
SL AMB POCT CLARITY,UA: CLEAR
SL AMB POCT COLOR,UA: ABNORMAL
SL AMB POCT KETONES,UA: ABNORMAL
SL AMB POCT NITRITE,UA: ABNORMAL
SL AMB POCT PH,UA: ABNORMAL
SL AMB POCT SPECIFIC GRAVITY,UA: 1.01
SL AMB POCT URINE PROTEIN: ABNORMAL
SL AMB POCT UROBILINOGEN: ABNORMAL

## 2023-12-23 PROCEDURE — 87186 SC STD MICRODIL/AGAR DIL: CPT

## 2023-12-23 PROCEDURE — 87086 URINE CULTURE/COLONY COUNT: CPT

## 2023-12-23 PROCEDURE — 99213 OFFICE O/P EST LOW 20 MIN: CPT

## 2023-12-23 PROCEDURE — 81002 URINALYSIS NONAUTO W/O SCOPE: CPT

## 2023-12-23 PROCEDURE — 87077 CULTURE AEROBIC IDENTIFY: CPT

## 2023-12-23 RX ORDER — NITROFURANTOIN 25; 75 MG/1; MG/1
100 CAPSULE ORAL 2 TIMES DAILY
Qty: 10 CAPSULE | Refills: 0 | Status: SHIPPED | OUTPATIENT
Start: 2023-12-23 | End: 2023-12-23

## 2023-12-23 RX ORDER — NITROFURANTOIN 25; 75 MG/1; MG/1
100 CAPSULE ORAL 2 TIMES DAILY
Qty: 10 CAPSULE | Refills: 0 | Status: SHIPPED | OUTPATIENT
Start: 2023-12-23 | End: 2023-12-28

## 2023-12-23 NOTE — PROGRESS NOTES
"  [facility]        NAME: Lynne Riley is a 48 y.o. female  : 1975    MRN: [id number]  DATE: 2023  TIME: 1:09 PM    Assessment and Plan   Acute cystitis with hematuria [N30.01]  1. Acute cystitis with hematuria  nitrofurantoin (MACROBID) 100 mg capsule    Urine culture            Patient Instructions       Follow up with PCP in 3-5 days.  Proceed to  ER if symptoms worsen.    Chief Complaint     Chief Complaint   Patient presents with    Possible UTI         History of Present Illness       49 y/o F presents for UTI symptoms x 1 day. Took Pyridium without relief.         Review of Systems   Review of Systems   Gastrointestinal:  Negative for abdominal pain.   Genitourinary:  Positive for dysuria, frequency and urgency. Negative for flank pain, hematuria and pelvic pain.         Current Medications       Current Outpatient Medications:     nitrofurantoin (MACROBID) 100 mg capsule, Take 1 capsule (100 mg total) by mouth 2 (two) times a day for 5 days, Disp: 10 capsule, Rfl: 0    Current Allergies     Allergies as of 2023    (No Known Allergies)            The following portions of the patient's history were reviewed and updated as appropriate: allergies, current medications, past family history, past medical history, past social history, past surgical history and problem list.     Past Medical History:   Diagnosis Date    Anemia     slight    Anesthesia     \"after deviated septum surgery area just behind front teeth(incisive papilla) became very inflamed and sore/required prednisone\"    Anesthesia complication     teeth chattering so much pt chipped tooth    Anxiety     Balance problem     \"when experiencing dizziness\" \"from vertigo\"    Cancer ([facility]) 2019    Deformity of reconstructed breast     left breast \"some pockets\"    Dental crown present     Dental crown present     Difficulty swallowing     \"at times\"    Dizzy spells     \"working with PCP\"    Ear problem     \"damage " "noted unknown cause, Right\"    Fatigue     GERD (gastroesophageal reflux disease)     Limb alert care status     No BP/IV left side    Motion sickness     Nausea     \"with dizziness\"    Night sweat     occ    Palpitations     PONV (postoperative nausea and vomiting)     \"had scop patch last surgery was helpful\"    Rectal discomfort 03/16/2021    RLQ abdominal pain 03/16/2021    Seasonal allergies     Shortness of breath     \"randomly\"    Tinnitus     occas       Past Surgical History:   Procedure Laterality Date    AUGMENTATION BREAST Right 12/18/2019    Procedure: AUGMENTATION BREAST;  Surgeon: Isai Aragon MD;  [facility];  Service: Plastics    AUGMENTATION MAMMAPLASTY Bilateral 12/18/2019    BREAST BIOPSY Bilateral 02/20/2019    rt- fibroadenoma, lt- br ca    BREAST RECONSTRUCTION Left 12/18/2019    Procedure: RECONSTRUCTION BREAST W/ IMPLANT;  Surgeon: Isai Aragon MD;  [facility];  Service: Plastics    CAPSULOTOMY Left 12/18/2019    Procedure: CAPSULECTOMY;  Surgeon: Isai Aragon MD;  [facility];  Service: Plastics    COLONOSCOPY      COLPOSCOPY  2000    INSERTION OF BREAST TISSUE EXPANDER Left 5/17/2019    Procedure: INSERTION/PLACEMENT TISSUE EXPANDER (EXCHANGE);  Surgeon: Isai Aragon MD;  [facility];  Service: Plastics    LEG SURGERY Left     cyst removed from leg    LIPOSUCTION W/ FAT INJECTION Bilateral 8/19/2020    Procedure: MOUND FAT GRAFTING;  Surgeon: Isai Aragon MD;  [facility];  Service: Plastics    MAMMO STEREOTACTIC BREAST BIOPSY RIGHT (ALL INC) Right 12/14/2022    MASTECTOMY Left 05/17/2019    MASTECTOMY W/ SENTINEL NODE BIOPSY Left 5/17/2019    Procedure: MASTECTOMY WITH BIOPSY LYMPH NODE SENTINEL; NUC MED 0730;  Surgeon: Britt Childs MD;  [facility];  Service: Surgical Oncology    KS IMPLNT BIO IMPLNT FOR SOFT TISSUE REINFORCEMENT Left 5/17/2019    Procedure: RECONSTRUCTION BREAST W/ IMPLANT;  Surgeon: Isai Aragon MD;  " Location: [facility];  Service: Plastics    RI REVISION OF RECONSTRUCTED BREAST Bilateral 8/19/2020    Procedure: BREAST MOUND REVISION WITH;  Surgeon: Isai Aragon MD;  [facility];  Service: Plastics    SINUS SURGERY      US GUIDANCE BREAST BIOPSY RIGHT EACH ADDITIONAL Right 2/20/2019    US GUIDED BREAST BIOPSY LEFT COMPLETE Left 2/20/2019    WISDOM TOOTH EXTRACTION      WISDOM TOOTH EXTRACTION         Family History   Problem Relation Age of Onset    No Known Problems Mother     No Known Problems Father     No Known Problems Sister     No Known Problems Sister     No Known Problems Sister     No Known Problems Brother     Leukemia Maternal Grandmother 83    Cancer Maternal Grandmother     Melanoma Maternal Grandfather 70    Cancer Maternal Grandfather     No Known Problems Paternal Grandmother     No Known Problems Paternal Grandfather     Rectal cancer Maternal Aunt     No Known Problems Maternal Aunt     No Known Problems Paternal Aunt     No Known Problems Paternal Aunt     No Known Problems Paternal Aunt     No Known Problems Paternal Aunt     No Known Problems Paternal Aunt     Coronary artery disease Cousin     Glaucoma Other     Colon cancer Neg Hx     Colon polyps Neg Hx          Medications have been verified.        Objective   /76   Pulse 79   Temp 97.7 °F (36.5 °C) (Temporal)   Resp 18   LMP 12/17/2023 (Approximate) Comment: Pt reports LMP was normal.  SpO2 98%   Patient's last menstrual period was 12/17/2023 (approximate).       Physical Exam     Physical Exam  Vitals and nursing note reviewed.   Constitutional:       General: She is not in acute distress.     Appearance: She is not toxic-appearing.   HENT:      Head: Normocephalic and atraumatic.   Eyes:      Conjunctiva/sclera: Conjunctivae normal.   Pulmonary:      Effort: Pulmonary effort is normal.   Abdominal:      General: There is no distension.      Palpations: Abdomen is soft.      Tenderness: There is no abdominal  tenderness. There is no right CVA tenderness, left CVA tenderness or guarding.   Neurological:      Mental Status: She is alert.   Psychiatric:         Mood and Affect: Mood normal.         Behavior: Behavior normal.

## 2023-12-26 LAB — BACTERIA UR CULT: ABNORMAL

## 2024-01-26 DIAGNOSIS — M79.621 PAIN IN RIGHT UPPER ARM: Primary | ICD-10-CM

## 2024-02-15 ENCOUNTER — EVALUATION (OUTPATIENT)
Dept: PHYSICAL THERAPY | Facility: CLINIC | Age: 49
End: 2024-02-15
Payer: COMMERCIAL

## 2024-02-15 DIAGNOSIS — M79.621 PAIN IN RIGHT UPPER ARM: ICD-10-CM

## 2024-02-15 PROCEDURE — 97161 PT EVAL LOW COMPLEX 20 MIN: CPT

## 2024-02-15 PROCEDURE — 97110 THERAPEUTIC EXERCISES: CPT

## 2024-02-15 NOTE — PROGRESS NOTES
PT Evaluation     Today's date: 2/15/2024  Patient name: Lynne Riley  : 1975  MRN: [id number]  Referring provider: Chris Gómez CRNP  Dx:   Encounter Diagnosis     ICD-10-CM    1. Pain in right upper arm  M79.621 Ambulatory Referral to Physical Therapy                     Assessment  Assessment details: Lynne Riley is a pleasant 48 y.o. female who presents with chronic R arm pain. She presents with full and painfree R shoulder ROM, decreased R ER, abduction, middle, and lower trapezius strength, (+) empty can, and poor scapular control. These impairments are limiting her with side planks, lateral flies, reverse flies, and some other gym activities as well as she is sore after a full work day. These signs and symptoms are consistent with Pain in right upper arm with possible RTC pathology. She will benefit from skilled PT to address impairments and return to PLOF. At this time no referral is necessary based on examination.         Impairments: activity intolerance, impaired physical strength, lacks appropriate home exercise program and pain with function  Prognosis details: Positive prognostic indicators include positive attitude toward recovery, motivated to improve, high self-efficacy, good understanding of condition, realistic expectations.  Negative prognostic indicators include chronicity of symptoms    Goals  STG to be achieved in 4 weeks  Patient will have improved R ER strength to 5/5 at 90 degrees abduction  Patient will have improved R lower and middle trap strength to 5/5  Improved scapular control  Patient will be independent with HEP.    LTG to be achieved in 8 weeks  Patient will be able to resume all gym activities with no difficulty  Patient will be able to complete 12 hour work day without pain        Plan  Patient would benefit from: skilled physical therapy  Planned modality interventions: cryotherapy and thermotherapy: hydrocollator packs  Planned therapy interventions: home  exercise program, functional ROM exercises, body mechanics training, joint mobilization, manual therapy, neuromuscular re-education, therapeutic exercise, therapeutic activities, stretching, strengthening, flexibility and patient education  Frequency: 1x week  Plan of Care beginning date: 2/15/2024  Plan of Care expiration date: 2024  Treatment plan discussed with: patient        Subjective Evaluation    History of Present Illness  Date of onset: 2/15/2023  Mechanism of injury: Patient reports R arm pain beginning about a year ago. Notes she thinks it was aggravated at work. It got better when she rested for 2 weeks while on vacation. Notes still having pain in her arm along tricep - most pain with lateral and reverse flies, side planks. She does have pain after work sometimes. Able to do all work and daily tasks. Notes feeling she has decreased mobility      Patient Goals  Patient goal: Patient would like to improve her mobility and strength.  Pain  Current pain ratin      Diagnostic Tests  No diagnostic tests performed  Treatments  No previous or current treatments        Objective     Tenderness     Additional Tenderness Details  No TTP    Active Range of Motion   Left Shoulder   Normal active range of motion    Right Shoulder   Normal active range of motion    Scapular Mobility     Right Shoulder   Scapular Mobility with Shoulder to 90° FF   Upward rotation: excessive    Scapular Mobility beyond 90° FF   Upward rotation: excessive    Strength/Myotome Testing     Left Shoulder     Planes of Motion   Flexion: 5   Extension: 5   Abduction: 5   External rotation at 0°: 5   External rotation at 90°: 5   Internal rotation at 0°: 5     Isolated Muscles   Lower trapezius: 5   Middle trapezius: 5     Right Shoulder     Planes of Motion   Flexion: 4+   Extension: 5   Abduction: 4- (pain)   External rotation at 0°: 4+   External rotation at 90°: 3+   Internal rotation at 0°: 4     Isolated Muscles   Lower  trapezius: 4   Middle trapezius: 4     Left Elbow   Flexion: 5    Right Elbow   Flexion: 5    Tests     Right Shoulder   Positive empty can.              Precautions: hx of cancer  Functional Limitations: side planks, reverse and lateral raises, other gym exercises, work  Impairments: R scapular motor control, R low and mid trap strength, R ER strength at endrange  MedBridge Code: [id number]   POC expiration: 4/11/2024        Manuals 2/15                                                                Neuro Re-Ed             Quadruped push up plus HEP            Full push up plus             Ball on wall circles             Body blade             D2 flex TB             Serratus wall slides nv            Scap wall clock nv            Ther Ex             UBE nv            Prone Y, T HEP            TB row nv            TB ext nv            TB horz abd             Eccentric scaption Nv?            Supine 90/90 ER Nv?                         Ther Activity                                       Gait Training                                       Modalities

## 2024-02-20 ENCOUNTER — OFFICE VISIT (OUTPATIENT)
Dept: PHYSICAL THERAPY | Facility: CLINIC | Age: 49
End: 2024-02-20
Payer: COMMERCIAL

## 2024-02-20 DIAGNOSIS — M79.621 PAIN IN RIGHT UPPER ARM: Primary | ICD-10-CM

## 2024-02-20 PROCEDURE — 97110 THERAPEUTIC EXERCISES: CPT

## 2024-02-20 PROCEDURE — 97112 NEUROMUSCULAR REEDUCATION: CPT

## 2024-02-20 PROCEDURE — 97140 MANUAL THERAPY 1/> REGIONS: CPT

## 2024-02-20 NOTE — PROGRESS NOTES
"Daily Note     Today's date: 2024  Patient name: Lynne Riley  : 1975  MRN: [id number]  Referring provider: Chris Gómez CRNP  Dx:   Encounter Diagnosis     ICD-10-CM    1. Pain in right upper arm  M79.621                      Subjective: Pt notes no change in symptoms since IE. Noted sleeping prone with arms OH was very painful last night.       Objective: See treatment diary below      Assessment: Tolerated treatment well. Noted continued scapular weakness and winging as well as asymmetrical scap/shoulder active movements. Patient demonstrated fatigue post treatment, exhibited good technique with therapeutic exercises, and would benefit from continued PT      Plan: Continue per plan of care.  Progress treatment as tolerated.       Precautions: hx of cancer  Functional Limitations: side planks, reverse and lateral raises, other gym exercises, work  Impairments: R scapular motor control, R low and mid trap strength, R ER strength at endrange  Med[employer]iLearning Code: [id number]   POC expiration: 2024        Manuals 2/15 2/20           R' Shoulder PROM  WE           Scapular ROM  WE                                     Neuro Re-Ed             Quadruped push up plus HEP            Full push up plus             Wall [place]   10x2           Ball on wall circles  4#   Cw/ccw  10x2 ea           Body blade             D2 flex TB             Serratus wall slides nv OTB  10x2           Scap wall clock nv OTB  10x2           Ther Ex             UBE nv 5'  retro           Prone Y, T HEP            TB row nv MTB  10x2           TB ext nv MTB  10x2           TB horz abd             Eccentric scaption Nv? 4#  5\"hold  5\" ecc  x10           Supine 90/90 ER Nv? 3#  10x2                        Ther Activity                                       Gait Training                                       Modalities                                            "

## 2024-02-27 ENCOUNTER — OFFICE VISIT (OUTPATIENT)
Dept: PHYSICAL THERAPY | Facility: CLINIC | Age: 49
End: 2024-02-27
Payer: COMMERCIAL

## 2024-02-27 DIAGNOSIS — M79.621 PAIN IN RIGHT UPPER ARM: Primary | ICD-10-CM

## 2024-02-27 PROCEDURE — 97112 NEUROMUSCULAR REEDUCATION: CPT

## 2024-02-27 PROCEDURE — 97110 THERAPEUTIC EXERCISES: CPT

## 2024-02-27 PROCEDURE — 97140 MANUAL THERAPY 1/> REGIONS: CPT

## 2024-02-27 NOTE — PROGRESS NOTES
"Daily Note     Today's date: 2024  Patient name: Lynne Riley  : 1975  MRN: [id number]  Referring provider: Chris Gómez CRNP  Dx:   Encounter Diagnosis     ICD-10-CM    1. Pain in right upper arm  M79.621                      Subjective: Pt notes overall feeling about the same.       Objective: See treatment diary below    Full and normal elbow ROM with overpressure  (-) R median, radial, and ulnar nerve tension tests    Assessment: Patient tolerated treatment well. Screened elbow ROM - WNLs and note negative nerve tension. Continued shoulder weakness/instability especially in closed chain. Will benefit from skilled PT to address impairments and return to PLOF      Plan: Continue per plan of care.  Progress treatment as tolerated.       Precautions: hx of cancer  Functional Limitations: side planks, reverse and lateral raises, other gym exercises, work  Impairments: R scapular motor control, R low and mid trap strength, R ER strength at endrange  [employer] Code: [email or web address]   POC expiration: 2024        Manuals 2/15 2/20 2/27          R' Shoulder PROM  WE MB          Scapular ROM  WE                                     Neuro Re-Ed             Quadruped push up plus HEP            Full push up plus             Wall [place]   10x2           Ball on wall circles  4#   Cw/ccw  10x2 ea           Body blade             D2 flex TB             Serratus wall slides nv OTB  10x2 OTB 2x10          Scap wall clock nv OTB  10x2 OTB 2x10          Bear crawls   2x10          touchdowns   2x10          OHP overhead punch   5# KB 2x10          Ther Ex             UBE nv 5'  retro           Prone Y, T HEP            TB row nv MTB  10x2           TB ext nv MTB  10x2           TB horz abd             Eccentric scaption Nv? 4#  5\"hold  5\" ecc  x10           Supine 90/90 ER Nv? 3#  10x2           1/2 kneel SA pulldown - focus on eccentric   25# 2x10          Ther Activity                                       Gait " Training                                       Modalities

## 2024-03-15 ENCOUNTER — OFFICE VISIT (OUTPATIENT)
Dept: PHYSICAL THERAPY | Facility: CLINIC | Age: 49
End: 2024-03-15
Payer: COMMERCIAL

## 2024-03-15 DIAGNOSIS — M79.621 PAIN IN RIGHT UPPER ARM: Primary | ICD-10-CM

## 2024-03-15 PROCEDURE — 97112 NEUROMUSCULAR REEDUCATION: CPT

## 2024-03-15 PROCEDURE — 97110 THERAPEUTIC EXERCISES: CPT

## 2024-03-15 NOTE — PROGRESS NOTES
"Daily Note     Today's date: 3/15/2024  Patient name: Lynne Riley  : 1975  MRN: [id number]  Referring provider: Chris Gómez CRNP  Dx:   Encounter Diagnosis     ICD-10-CM    1. Pain in right upper arm  M79.621                      Subjective: Pt notes no pain in R arm or shoulder but still has difficulty with a full side plank position      Objective: See treatment diary below        Assessment: Patient tolerated treatment well. Discussed progression variations toward a full side plank. Updated HEP. At this time she is being discharged to independent HEP      Plan: Continue per plan of care.  Progress treatment as tolerated.       Precautions: hx of cancer  Functional Limitations: side planks, reverse and lateral raises, other gym exercises, work  Impairments: R scapular motor control, R low and mid trap strength, R ER strength at endrange  [employer] Code: [id number]   POC expiration: 2024        Manuals 2/15 2/20 2/27 3/15         R' Shoulder PROM  WE MB MB         Scapular ROM  WE                                     Neuro Re-Ed             Quadruped push up plus HEP            Full push up plus             Wall [place]   10x2           Ball on wall circles  4#   Cw/ccw  10x2 ea           Body blade             D2 flex TB             Serratus wall slides nv OTB  10x2 OTB 2x10          Scap wall clock nv OTB  10x2 OTB 2x10          Bear crawls   2x10          touchdowns   2x10          OHP overhead punch   5# KB 2x10 7.5# 2x10 OHP         Full plank w/ rotations    20x         Forearm plank on BOSU    30\"x3         Ball on wall circles in abd at 90 deg    20ea         Sidelying serratus punch in abd    5# KB 20x                      Ther Ex             UBE nv 5'  retro           Prone Y, T HEP            TB row nv MTB  10x2           TB ext nv MTB  10x2           TB horz abd             Eccentric scaption Nv? 4#  5\"hold  5\" ecc  x10           Supine 90/90 ER Nv? 3#  10x2           1/2 kneel SA " pulldown - focus on eccentric   25# 2x10          Bottoms up OHP                                                    Ther Activity                                       Gait Training                                       Modalities

## 2024-04-05 DIAGNOSIS — N92.6 IRREGULAR MENSES: Primary | ICD-10-CM

## 2024-05-01 ENCOUNTER — TELEPHONE (OUTPATIENT)
Dept: HEMATOLOGY ONCOLOGY | Facility: CLINIC | Age: 49
End: 2024-05-01

## 2024-05-01 NOTE — TELEPHONE ENCOUNTER
Patient Call    Who are you speaking with? Patient    If it is not the patient, are they listed on an active communication consent form? N/A   What is the reason for this call? Pt is calling in regards to a lump that she feels is a little more prononouned than previously. Pt doesn't recall if this area was previously examined but she would like to speak to someone at Dr Childs's office regarding this and if she needs an appt or if this area has been evaluated previously. Pt would like a call back please.    Does this require a call back? Yes   If a call back is required, please list best call back number [phone]   If a call back is required, advise that a message will be forwarded to their care team and someone will return their call as soon as possible.   Did you relay this information to the patient? Yes

## 2024-05-21 ENCOUNTER — OFFICE VISIT (OUTPATIENT)
Dept: SURGICAL ONCOLOGY | Facility: CLINIC | Age: 49
End: 2024-05-21
Payer: COMMERCIAL

## 2024-05-21 VITALS
RESPIRATION RATE: 16 BRPM | HEIGHT: 64 IN | WEIGHT: 135.4 LBS | SYSTOLIC BLOOD PRESSURE: 106 MMHG | BODY MASS INDEX: 23.12 KG/M2 | HEART RATE: 81 BPM | DIASTOLIC BLOOD PRESSURE: 64 MMHG | OXYGEN SATURATION: 97 % | TEMPERATURE: 97.7 F

## 2024-05-21 DIAGNOSIS — N63.11 MASS OF UPPER OUTER QUADRANT OF RIGHT BREAST: ICD-10-CM

## 2024-05-21 DIAGNOSIS — Z08 ENCOUNTER FOR FOLLOW-UP SURVEILLANCE OF BREAST CANCER: Primary | ICD-10-CM

## 2024-05-21 DIAGNOSIS — Z86.000 PERSONAL HISTORY OF IN-SITU NEOPLASM OF BREAST: ICD-10-CM

## 2024-05-21 DIAGNOSIS — N95.1 VASOMOTOR SYMPTOMS DUE TO MENOPAUSE: ICD-10-CM

## 2024-05-21 DIAGNOSIS — Z85.3 ENCOUNTER FOR FOLLOW-UP SURVEILLANCE OF BREAST CANCER: Primary | ICD-10-CM

## 2024-05-21 PROCEDURE — 99213 OFFICE O/P EST LOW 20 MIN: CPT | Performed by: NURSE PRACTITIONER

## 2024-05-21 NOTE — PROGRESS NOTES
Surgical Oncology Follow Up       [address] [facility] SURGICAL ONCOLOGY [place]  [address] [place]  [facility] [address]    Lynne Riley  1975  [id number]  [address] [facility] SURGICAL ONCOLOGY [place]  [address] [place] [address]    Chief Complaint   Patient presents with    Follow-up       Assessment/Plan:  1. Encounter for follow-up surveillance of breast cancer  - 6 mo f/u visit    2. 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 Mass of upper outer quadrant of right breast  - Mammo diagnostic right w 3d & cad; Future  - US breast right limited (diagnostic); Future    4. Vasomotor symptoms due to menopause  - Ambulatory Referral to Obstetrics / Gynecology; Future      Discussion/Summary: Patient is a 48-year-old female that was diagnosed with a left-sided breast cancer in February 2019.  Her pathology revealed DCIS, ER/%.  She underwent a mastectomy with Dr. Childs and had reconstruction with Dr. Aragon.  She had a contralateral augmentation.  She had a right-sided mammogram in July 2023 which was BI-RADS 2, category 4 density.  She presents today for an overdue follow-up visit with complaints of a new right breast lump.  Clinical exam today reveals multiple nodular small rounded masses in the upper outer quadrant of the right breast.  I suspect these are cyst/fat necrosis but given the change I will assess with a diagnostic mammogram and ultrasound.  Dr. Childs had previously recommended that the patient have a follow-up breast MRI in November 2023 which was never performed.  I will order this for her to be performed now.  No worrisome findings on left breast exam. Assuming her imaging is benign, we will plan to see her back in 6 months for a follow-up visit.  I am referring her to one  of our menopause specialists as she is inquiring about hormone replacement therapy and perimenopause.  She is in agreement with this plan.  All questions were answered today.    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   2/20/2019 Biopsy    Left breast biopsy 12:00 Periareolar     DCIS  ER/%     3/6/2019 Genetic Testing    [employer] with [employer] Hereditary Cancer  Update Test-    APC, LIBERTY, AXIN2, BARD1, BMPR1A, BRCA1, BRCA2, BRIP1, CDH1, CDK4, CDKN2A, CHEK2, EPCAM (large rearrangment only), HOXB13 (sequencing only), GALNT12, MLH1, MSH2, MSH3 (excluding repetitive portions of exon 1), MSH6, MUTYH, NBN, NTHL1, PALB2, PMS2, PTEN, RAD51C, RAD51D, RNF43, RPS20, SMAD4, STK11, TP53, Sequencing was performed for select regions of POLE and POLD1, and large rearrangement analysis was performed for select regions of GREM1         Negative     5/29/2019 -  Cancer Staged    Staging form: Breast, AJCC 8th Edition  - Pathologic: Stage 0 (pTis (DCIS), pN0(sn), cM0, G2, ER+, TX+, HER2: Not Assessed) - Signed by Britt Childs MD on 5/29/2019  Neoadjuvant therapy: No  Laterality: Left  Method of lymph node assessment: [place] lymph node biopsy  Histologic grading system: 3 grade system            -Interval History: Patient presents today for a follow-up visit/problem visit.  She states that she is now perimenopausal and is experiencing multiple vasomotor symptoms including hot flashes, mood changes and is interested in her options regarding hormone supplementation/HRT.  She states that with the perimenopausal effects she has also been experiencing changes on her self breast exam.  She states that for several weeks she noticed a new right breast lump at the 12:00 position. States it was visible in mirror.  She feels that it has decreased in size slightly since starting her menstrual cycle.    Review of Systems:  Review of Systems   Constitutional:   "Negative for activity change, appetite change, chills, fatigue, fever and unexpected weight change.   Respiratory:  Negative for cough and shortness of breath.    Cardiovascular:  Negative for chest pain.   Gastrointestinal:  Negative for abdominal pain, constipation, diarrhea, nausea and vomiting.   Endocrine: Positive for heat intolerance.   Musculoskeletal:  Negative for arthralgias, back pain, gait problem and myalgias.   Skin:  Negative for color change and rash.   Neurological:  Negative for dizziness and headaches.   Hematological:  Negative for adenopathy.   Psychiatric/Behavioral:  Positive for dysphoric mood (mood changes- chanel menopause). Negative for agitation and confusion.    All other systems reviewed and are negative.      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    Dense breast tissue    Laryngopharyngeal reflux (LPR)    Dizziness    DNS (deviated nasal septum)    Low libido    Dyspareunia, female    Secondary female infertility    Breast cancer screening, high risk patient    Capsular contracture of breast implant    Encounter for follow-up surveillance of breast cancer    Anesthesia    PONV (postoperative nausea and vomiting)    Dental crown present    Deformity and disproportion of reconstructed breast    Epigastric pressure    Dysphagia    Endometrioma of ovary    Grade I hemorrhoids    Abnormal MRI, breast    Abnormal mammogram of right breast     Past Medical History:   Diagnosis Date    Anemia     slight    Anesthesia     \"after deviated septum surgery area just behind front teeth(incisive papilla) became very inflamed and sore/required prednisone\"    Anesthesia complication     teeth chattering so much pt chipped tooth    Anxiety     Balance problem     \"when experiencing dizziness\" \"from vertigo\"    Cancer ([facility] 1/2019    Deformity of reconstructed breast     left breast \"some pockets\"    Dental crown present     Dental crown present     " "Difficulty swallowing     \"at times\"    Dizzy spells     \"working with PCP\"    Ear problem     \"damage noted unknown cause, Right\"    Fatigue     GERD (gastroesophageal reflux disease)     Limb alert care status     No BP/IV left side    Motion sickness     Nausea     \"with dizziness\"    Night sweat     occ    Palpitations     PONV (postoperative nausea and vomiting)     \"had scop patch last surgery was helpful\"    Rectal discomfort 03/16/2021    RLQ abdominal pain 03/16/2021    Seasonal allergies     Shortness of breath     \"randomly\"    Tinnitus     occas     Past Surgical History:   Procedure Laterality Date    AUGMENTATION BREAST Right 12/18/2019    Procedure: AUGMENTATION BREAST;  Surgeon: Isai Aragon MD;  [facility];  Service: Plastics    AUGMENTATION MAMMAPLASTY Bilateral 12/18/2019    BREAST BIOPSY Bilateral 02/20/2019    rt- fibroadenoma, lt- br ca    BREAST RECONSTRUCTION Left 12/18/2019    Procedure: RECONSTRUCTION BREAST W/ IMPLANT;  Surgeon: Isai Aragon MD;  [facility];  Service: Plastics    CAPSULOTOMY Left 12/18/2019    Procedure: CAPSULECTOMY;  Surgeon: Isai Aragon MD;  [facility];  Service: Plastics    COLONOSCOPY      COLPOSCOPY  2000    INSERTION OF BREAST TISSUE EXPANDER Left 5/17/2019    Procedure: INSERTION/PLACEMENT TISSUE EXPANDER (EXCHANGE);  Surgeon: Isai Aragon MD;  [facility];  Service: Plastics    LEG SURGERY Left     cyst removed from leg    LIPOSUCTION W/ FAT INJECTION Bilateral 8/19/2020    Procedure: MOUND FAT GRAFTING;  Surgeon: Isai Aragon MD;  [facility];  Service: Plastics    MAMMO STEREOTACTIC BREAST BIOPSY RIGHT (ALL INC) Right 12/14/2022    MASTECTOMY Left 05/17/2019    MASTECTOMY W/ SENTINEL NODE BIOPSY Left 5/17/2019    Procedure: MASTECTOMY WITH BIOPSY LYMPH NODE SENTINEL; NUC MED 0730;  Surgeon: Britt Childs MD;  [facility];  Service: Surgical Oncology    IL IMPLNT BIO IMPLNT FOR SOFT TISSUE " REINFORCEMENT Left 2019    Procedure: RECONSTRUCTION BREAST W/ IMPLANT;  Surgeon: Isai Aragon MD;  [facility];  Service: Plastics    NC REVISION OF RECONSTRUCTED BREAST Bilateral 2020    Procedure: BREAST MOUND REVISION WITH;  Surgeon: Isai Aragon MD;  [facility];  Service: Plastics    SINUS SURGERY      US GUIDANCE BREAST BIOPSY RIGHT EACH ADDITIONAL Right 2019    US GUIDED BREAST BIOPSY LEFT COMPLETE Left 2019    WISDOM TOOTH EXTRACTION      WISDOM TOOTH EXTRACTION       Family History   Problem Relation Age of Onset    No Known Problems Mother     No Known Problems Father     No Known Problems Sister     No Known Problems Sister     No Known Problems Sister     No Known Problems Brother     Leukemia Maternal Grandmother 83    Cancer Maternal Grandmother     Melanoma Maternal Grandfather 70    Cancer Maternal Grandfather     No Known Problems Paternal Grandmother     No Known Problems Paternal Grandfather     Rectal cancer Maternal Aunt     No Known Problems Maternal Aunt     No Known Problems Paternal Aunt     No Known Problems Paternal Aunt     No Known Problems Paternal Aunt     No Known Problems Paternal Aunt     No Known Problems Paternal Aunt     Coronary artery disease Cousin     Glaucoma Other     Colon cancer Neg Hx     Colon polyps Neg Hx      Social History     Socioeconomic History    Marital status: /Civil Union     Spouse name: Not on file    Number of children: Not on file    Years of education: Not on file    Highest education level: Not on file   Occupational History    Occupation: Student/Patient Care assoc    Occupation: ED RN     Employer: [employer] EMPLOYEES   Tobacco Use    Smoking status: Former     Current packs/day: 0.00     Types: Cigarettes     Start date:      Quit date:      Years since quittin.4    Smokeless tobacco: Never    Tobacco comments:     was a casual former smoker    Vaping Use    Vaping status: Never Used    Substance and Sexual Activity    Alcohol use: Yes     Alcohol/week: 3.0 standard drinks of alcohol     Types: 3 Glasses of wine per week     Comment: wine    Drug use: No    Sexual activity: Yes     Partners: Male     Birth control/protection: None   Other Topics Concern    Not on file   Social History Narrative    Pt's menstrual flow is regular lasting 5 days. Cycle is 30-31 days    Pt has had one pregnancy    Last PAP 10/2018    Last mammo was 1/19     Social Determinants of Health     Financial Resource Strain: Not on file   Food Insecurity: Not on file   Transportation Needs: Not on file   Physical Activity: Not on file   Stress: Not on file   Social Connections: Not on file   Intimate Partner Violence: Not on file   Housing Stability: Not on file     No current outpatient medications on file.  No Known Allergies  Vitals:    05/21/24 1137   BP: 106/64   Pulse: 81   Resp: 16   Temp: 97.7 °F (36.5 °C)   SpO2: 97%       Physical Exam  Vitals reviewed.   Constitutional:       General: She is not in acute distress.     Appearance: Normal appearance. She is well-developed. She is not diaphoretic.   HENT:      Head: Normocephalic and atraumatic.   Cardiovascular:      Rate and Rhythm: Normal rate and regular rhythm.      Heart sounds: Normal heart sounds.   Pulmonary:      Effort: Pulmonary effort is normal.      Breath sounds: Normal breath sounds.   Chest:   Breasts:     Right: No swelling, bleeding, inverted nipple, mass, nipple discharge, skin change or tenderness.          Comments: Left reconstructed breast with implant present. No dominant masses, skin changes or nodules    Multiple rounded nodularities in the right upper outer quadrant, suspect cysts/ fat necrosis    Right breast implant present    Abdominal:      Palpations: Abdomen is soft. There is no mass.      Tenderness: There is no abdominal tenderness.   Musculoskeletal:         General: Normal range of motion.      Cervical back: Normal range of  motion.   Lymphadenopathy:      Upper Body:      Right upper body: No supraclavicular or axillary adenopathy.      Left upper body: No supraclavicular or axillary adenopathy.   Skin:     General: Skin is warm and dry.      Findings: No rash.   Neurological:      Mental Status: She is alert and oriented to person, place, and time.   Psychiatric:         Speech: Speech normal.           Advance Care Planning/Advance Directives:  Discussed disease status, cancer treatment plans and/or cancer treatment goals with the patient.

## 2024-05-31 ENCOUNTER — TELEPHONE (OUTPATIENT)
Dept: FAMILY MEDICINE CLINIC | Facility: HOSPITAL | Age: 49
End: 2024-05-31

## 2024-05-31 DIAGNOSIS — Z00.00 LABORATORY EXAM ORDERED AS PART OF ROUTINE GENERAL MEDICAL EXAMINATION: Primary | ICD-10-CM

## 2024-05-31 DIAGNOSIS — Z13.1 SCREENING FOR DIABETES MELLITUS: ICD-10-CM

## 2024-05-31 DIAGNOSIS — Z13.220 SCREENING CHOLESTEROL LEVEL: ICD-10-CM

## 2024-06-05 ENCOUNTER — APPOINTMENT (OUTPATIENT)
Dept: LAB | Facility: HOSPITAL | Age: 49
End: 2024-06-05
Payer: COMMERCIAL

## 2024-06-05 DIAGNOSIS — Z13.220 SCREENING CHOLESTEROL LEVEL: ICD-10-CM

## 2024-06-05 DIAGNOSIS — N92.6 IRREGULAR MENSES: ICD-10-CM

## 2024-06-05 DIAGNOSIS — M25.60 JOINT STIFFNESS: Primary | ICD-10-CM

## 2024-06-05 DIAGNOSIS — Z00.00 LABORATORY EXAM ORDERED AS PART OF ROUTINE GENERAL MEDICAL EXAMINATION: ICD-10-CM

## 2024-06-05 DIAGNOSIS — M25.60 MULTIPLE STIFF JOINTS: ICD-10-CM

## 2024-06-05 DIAGNOSIS — Z00.8 HEALTH EXAMINATION IN POPULATION SURVEY: ICD-10-CM

## 2024-06-05 LAB
ALBUMIN SERPL BCP-MCNC: 4.4 G/DL (ref 3.5–5)
ALP SERPL-CCNC: 50 U/L (ref 34–104)
ALT SERPL W P-5'-P-CCNC: 9 U/L (ref 7–52)
ANA SER QL IA: NEGATIVE
ANION GAP SERPL CALCULATED.3IONS-SCNC: 6 MMOL/L (ref 4–13)
AST SERPL W P-5'-P-CCNC: 14 U/L (ref 13–39)
BASOPHILS # BLD AUTO: 0.03 THOUSANDS/ÂΜL (ref 0–0.1)
BASOPHILS NFR BLD AUTO: 1 % (ref 0–1)
BILIRUB SERPL-MCNC: 1.17 MG/DL (ref 0.2–1)
BUN SERPL-MCNC: 17 MG/DL (ref 5–25)
CALCIUM SERPL-MCNC: 9.3 MG/DL (ref 8.4–10.2)
CHLORIDE SERPL-SCNC: 104 MMOL/L (ref 96–108)
CHOLEST SERPL-MCNC: 194 MG/DL
CO2 SERPL-SCNC: 26 MMOL/L (ref 21–32)
CREAT SERPL-MCNC: 0.77 MG/DL (ref 0.6–1.3)
EOSINOPHIL # BLD AUTO: 0.08 THOUSAND/ÂΜL (ref 0–0.61)
EOSINOPHIL NFR BLD AUTO: 1 % (ref 0–6)
ERYTHROCYTE [DISTWIDTH] IN BLOOD BY AUTOMATED COUNT: 12.1 % (ref 11.6–15.1)
EST. AVERAGE GLUCOSE BLD GHB EST-MCNC: 105 MG/DL
GFR SERPL CREATININE-BSD FRML MDRD: 92 ML/MIN/1.73SQ M
GLUCOSE SERPL-MCNC: 93 MG/DL (ref 65–140)
HBA1C MFR BLD: 5.3 %
HCT VFR BLD AUTO: 38.2 % (ref 34.8–46.1)
HDLC SERPL-MCNC: 60 MG/DL
HGB BLD-MCNC: 12.2 G/DL (ref 11.5–15.4)
IMM GRANULOCYTES # BLD AUTO: 0.02 THOUSAND/UL (ref 0–0.2)
IMM GRANULOCYTES NFR BLD AUTO: 0 % (ref 0–2)
LDLC SERPL CALC-MCNC: 118 MG/DL (ref 0–100)
LYMPHOCYTES # BLD AUTO: 1.55 THOUSANDS/ÂΜL (ref 0.6–4.47)
LYMPHOCYTES NFR BLD AUTO: 26 % (ref 14–44)
MCH RBC QN AUTO: 27.8 PG (ref 26.8–34.3)
MCHC RBC AUTO-ENTMCNC: 31.9 G/DL (ref 31.4–37.4)
MCV RBC AUTO: 87 FL (ref 82–98)
MONOCYTES # BLD AUTO: 0.32 THOUSAND/ÂΜL (ref 0.17–1.22)
MONOCYTES NFR BLD AUTO: 5 % (ref 4–12)
NEUTROPHILS # BLD AUTO: 4.06 THOUSANDS/ÂΜL (ref 1.85–7.62)
NEUTS SEG NFR BLD AUTO: 67 % (ref 43–75)
NONHDLC SERPL-MCNC: 134 MG/DL
NRBC BLD AUTO-RTO: 0 /100 WBCS
PLATELET # BLD AUTO: 283 THOUSANDS/UL (ref 149–390)
PMV BLD AUTO: 11.3 FL (ref 8.9–12.7)
POTASSIUM SERPL-SCNC: 4 MMOL/L (ref 3.5–5.3)
PROT SERPL-MCNC: 7.2 G/DL (ref 6.4–8.4)
RBC # BLD AUTO: 4.39 MILLION/UL (ref 3.81–5.12)
RHEUMATOID FACT SER QL LA: NEGATIVE
SODIUM SERPL-SCNC: 136 MMOL/L (ref 135–147)
TRIGL SERPL-MCNC: 79 MG/DL
TSH SERPL DL<=0.05 MIU/L-ACNC: 1.9 UIU/ML (ref 0.45–4.5)
WBC # BLD AUTO: 6.06 THOUSAND/UL (ref 4.31–10.16)

## 2024-06-05 PROCEDURE — 84443 ASSAY THYROID STIM HORMONE: CPT

## 2024-06-05 PROCEDURE — 85025 COMPLETE CBC W/AUTO DIFF WBC: CPT

## 2024-06-05 PROCEDURE — 83036 HEMOGLOBIN GLYCOSYLATED A1C: CPT

## 2024-06-05 PROCEDURE — 80053 COMPREHEN METABOLIC PANEL: CPT

## 2024-06-05 PROCEDURE — 80061 LIPID PANEL: CPT

## 2024-06-05 PROCEDURE — 86038 ANTINUCLEAR ANTIBODIES: CPT

## 2024-06-05 PROCEDURE — 36415 COLL VENOUS BLD VENIPUNCTURE: CPT

## 2024-06-05 PROCEDURE — 86430 RHEUMATOID FACTOR TEST QUAL: CPT

## 2024-06-07 ENCOUNTER — NURSE TRIAGE (OUTPATIENT)
Age: 49
End: 2024-06-07

## 2024-06-07 ENCOUNTER — OFFICE VISIT (OUTPATIENT)
Dept: GYNECOLOGY | Facility: CLINIC | Age: 49
End: 2024-06-07
Payer: COMMERCIAL

## 2024-06-07 VITALS — BODY MASS INDEX: 22.97 KG/M2 | SYSTOLIC BLOOD PRESSURE: 106 MMHG | DIASTOLIC BLOOD PRESSURE: 62 MMHG | WEIGHT: 133.8 LBS

## 2024-06-07 DIAGNOSIS — N80.129 ENDOMETRIOMA OF OVARY: ICD-10-CM

## 2024-06-07 DIAGNOSIS — N34.2 INFLAMMATION OF URETHRA: ICD-10-CM

## 2024-06-07 DIAGNOSIS — N95.1 VASOMOTOR SYMPTOMS DUE TO MENOPAUSE: ICD-10-CM

## 2024-06-07 DIAGNOSIS — N76.2 ACUTE VULVITIS: ICD-10-CM

## 2024-06-07 DIAGNOSIS — B37.31 VAGINAL CANDIDIASIS: Primary | ICD-10-CM

## 2024-06-07 LAB
BV WHIFF TEST VAG QL: NEGATIVE
CLUE CELLS SPEC QL WET PREP: NEGATIVE
PH SMN: 5 [PH]
SL AMB POCT WET MOUNT: YES
T VAGINALIS VAG QL WET PREP: NEGATIVE
YEAST VAG QL WET PREP: POSITIVE

## 2024-06-07 PROCEDURE — 99214 OFFICE O/P EST MOD 30 MIN: CPT | Performed by: OBSTETRICS & GYNECOLOGY

## 2024-06-07 PROCEDURE — 87210 SMEAR WET MOUNT SALINE/INK: CPT | Performed by: OBSTETRICS & GYNECOLOGY

## 2024-06-07 RX ORDER — NYSTATIN AND TRIAMCINOLONE ACETONIDE 100000; 1 [USP'U]/G; MG/G
OINTMENT TOPICAL 2 TIMES DAILY PRN
Qty: 30 G | Refills: 2 | Status: SHIPPED | OUTPATIENT
Start: 2024-06-07

## 2024-06-07 RX ORDER — FLUCONAZOLE 150 MG/1
150 TABLET ORAL ONCE
Qty: 2 TABLET | Refills: 0 | Status: SHIPPED | OUTPATIENT
Start: 2024-06-07 | End: 2024-06-07

## 2024-06-07 NOTE — PROGRESS NOTES
Assessment & Plan   Diagnoses and all orders for this visit:    Vaginal candidiasis  -     POCT wet mount  -     fluconazole (DIFLUCAN) 150 mg tablet; Take 1 tablet (150 mg total) by mouth once for 1 dose Repeat in 1 week  -     nystatin-triamcinolone (MYCOLOG-II) ointment; Apply topically 2 (two) times a day as needed (Itching/irritation)    Endometrioma of ovary    Acute vulvitis  -     nystatin-triamcinolone (MYCOLOG-II) ointment; Apply topically 2 (two) times a day as needed (Itching/irritation)    Inflammation of urethra    1. yeast vulvovaginitis-noted on exam and wet prep today.  Status post Augmentin for sinus infection about 2 weeks ago.  Patient had 3-day history of itching.  She did use vagisil and Monistat externally.  Last night, took intravaginal Monistat ovule and had significant discomfort with this.  She tried to remove this to the best of her ability.  She came today for evaluation.  On exam, white discharge is noted with some erythema noted externally and internally.  Yeast vulvovaginitis was found.  Treatment options were reviewed.  Electronic prescription for fluconazole 150 mg p.o. x 1 with repeat in 1 week time sent to local pharmacy.  Also, Mycolog ointment to use twice daily as needed was sent.  She did not have any problems with external Monistat, so she could use this as needed.  She will call return if symptoms persist or greater than 2 weeks time.  2. urethral concerns-urethra does look somewhat prominent, likely related to swelling/irritation from the yeast vulvovaginal process.  The urethra is somewhat open, and she is concerned about possible risk for infection.  Would recommend usual hygiene and avoidance of any irritating substances to this area.  Likely, this will become less prominent as her yeast findings improved.  Should this continue, would recommend she return for exam.  She denies any dysuria, hematuria, urgency, or frequency or any signs of UTI or urinary incontinence.   "Exam is not suspicious for cystocele or urethrocele, slight laxity is noted anteriorly.  3. minimal vaginal atrophy-no significant changes are noted on exam.  She has noted some dryness more recently.  Vaginal lubrication/moisturizer would be recommended as needed  4. perimenopausal-does note some minimal symptoms.  She did have previous menses February 2024, last menses May 2024.  She will follow and call with any menometrorrhagia or other symptoms.  5. endometrioma-had most recent ultrasound August 2023 with 4.3 cm right complex cyst along with adjacent 1.4 cm corpus luteum cyst.  Previously, ultrasound was with 5.5 cm complex cyst from June 2021.  We did have some discussion about treatment for this.  She did not proceed with surgical intervention previously and notes no significant symptoms at this time.  To continue to follow.  6.  History of breast cancer-status post left mastectomy.  She continues to follow-up with Dr. Childs and Chris Cortés  7.  History of right breast biopsy-consistent with [facility]  8.  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      Subjective   Patient ID: Lynne Riley is a 48 y.o. female.    Vitals:    06/07/24 1340   BP: 106/62     Patient was seen today for follow-up visit.  Please see assessment plan for details.        The following portions of the patient's history were reviewed and updated as appropriate: allergies, current medications, past family history, past medical history, past social history, past surgical history, and problem list.  Past Medical History:   Diagnosis Date    Anemia     slight    Anesthesia     \"after deviated septum surgery area just behind front teeth(incisive papilla) became very inflamed and sore/required prednisone\"    Anesthesia complication     teeth chattering so much pt chipped tooth    Anxiety     Balance problem     \"when experiencing dizziness\" \"from vertigo\"    Cancer ([facility]) 1/2019    " "Deformity of reconstructed breast     left breast \"some pockets\"    Dental crown present     Dental crown present     Difficulty swallowing     \"at times\"    Dizzy spells     \"working with PCP\"    Ear problem     \"damage noted unknown cause, Right\"    Fatigue     GERD (gastroesophageal reflux disease)     Limb alert care status     No BP/IV left side    Motion sickness     Nausea     \"with dizziness\"    Night sweat     occ    Palpitations     PONV (postoperative nausea and vomiting)     \"had scop patch last surgery was helpful\"    Rectal discomfort 03/16/2021    RLQ abdominal pain 03/16/2021    Seasonal allergies     Shortness of breath     \"randomly\"    Tinnitus     occas     Past Surgical History:   Procedure Laterality Date    AUGMENTATION BREAST Right 12/18/2019    Procedure: AUGMENTATION BREAST;  Surgeon: Isai Aragon MD;  [facility];  Service: Plastics    AUGMENTATION MAMMAPLASTY Bilateral 12/18/2019    BREAST BIOPSY Bilateral 02/20/2019    rt- fibroadenoma, lt- br ca    BREAST RECONSTRUCTION Left 12/18/2019    Procedure: RECONSTRUCTION BREAST W/ IMPLANT;  Surgeon: Isai Aragon MD;  [facility];  Service: Plastics    CAPSULOTOMY Left 12/18/2019    Procedure: CAPSULECTOMY;  Surgeon: Isai Aragon MD;  [facility];  Service: Plastics    COLONOSCOPY      COLPOSCOPY  2000    INSERTION OF BREAST TISSUE EXPANDER Left 5/17/2019    Procedure: INSERTION/PLACEMENT TISSUE EXPANDER (EXCHANGE);  Surgeon: Isai Aragon MD;  [facility];  Service: Plastics    LEG SURGERY Left     cyst removed from leg    LIPOSUCTION W/ FAT INJECTION Bilateral 8/19/2020    Procedure: MOUND FAT GRAFTING;  Surgeon: Isai Aragon MD;  [facility];  Service: Plastics    MAMMO STEREOTACTIC BREAST BIOPSY RIGHT (ALL INC) Right 12/14/2022    MASTECTOMY Left 05/17/2019    MASTECTOMY W/ SENTINEL NODE BIOPSY Left 5/17/2019    Procedure: MASTECTOMY WITH BIOPSY LYMPH NODE SENTINEL; NUC MED 0730;  Surgeon: " Britt Childs MD;  [facility];  Service: Surgical Oncology    [place] IMPLNT BIO IMPLNT FOR SOFT TISSUE REINFORCEMENT Left 2019    Procedure: RECONSTRUCTION BREAST W/ IMPLANT;  Surgeon: Isai Aragon MD;  [facility];  Service: Plastics    [place] REVISION OF RECONSTRUCTED BREAST Bilateral 2020    Procedure: BREAST MOUND REVISION WITH;  Surgeon: Isai Aragon MD;  [facility];  Service: Plastics    SINUS SURGERY      US GUIDANCE BREAST BIOPSY RIGHT EACH ADDITIONAL Right 2019    US GUIDED BREAST BIOPSY LEFT COMPLETE Left 2019    WISDOM TOOTH EXTRACTION      WISDOM TOOTH EXTRACTION       OB History    Para Term  AB Living   1 1 1     1   SAB IAB Ectopic Multiple Live Births           1      # Outcome Date GA Lbr Nicola/2nd Weight Sex Type Anes PTL Lv   1 Term 10/03/12 40w6d   M Vag-Spont  N ROSHAN      Obstetric Comments   Menarche : 14   Age at first pregnancy 36   Hx of BCP    Hx of Hormone replacement        Current Outpatient Medications:     fluconazole (DIFLUCAN) 150 mg tablet, Take 1 tablet (150 mg total) by mouth once for 1 dose Repeat in 1 week, Disp: 2 tablet, Rfl: 0    nystatin-triamcinolone (MYCOLOG-II) ointment, Apply topically 2 (two) times a day as needed (Itching/irritation), Disp: 30 g, Rfl: 2  No Known Allergies  Social History     Socioeconomic History    Marital status: /Civil Union     Spouse name: None    Number of children: None    Years of education: None    Highest education level: None   Occupational History    Occupation: Student/Patient Care assoc    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   Tobacco Use    Smoking status: Former     Current packs/day: 0.00     Types: Cigarettes     Start date:      Quit date:      Years since quittin.4    Smokeless tobacco: Never    Tobacco comments:     was a casual former smoker    Vaping Use    Vaping status: Never Used   Substance and Sexual Activity    Alcohol use: Yes      Alcohol/week: 3.0 standard drinks of alcohol     Types: 3 Glasses of wine per week     Comment: wine    Drug use: No    Sexual activity: Yes     Partners: Male     Birth control/protection: None   Other Topics Concern    None   Social History Narrative    Pt's menstrual flow is regular lasting 5 days. Cycle is 30-31 days    Pt has had one pregnancy    Last PAP 10/2018    Last mammo was 1/19     Social Determinants of Health     Financial Resource Strain: Not on file   Food Insecurity: Not on file   Transportation Needs: Not on file   Physical Activity: Not on file   Stress: Not on file   Social Connections: Not on file   Intimate Partner Violence: Not on file   Housing Stability: Not on file     Family History   Problem Relation Age of Onset    No Known Problems Mother     No Known Problems Father     No Known Problems Sister     No Known Problems Sister     No Known Problems Sister     No Known Problems Brother     Leukemia Maternal Grandmother 83    Cancer Maternal Grandmother     Melanoma Maternal Grandfather 70    Cancer Maternal Grandfather     No Known Problems Paternal Grandmother     No Known Problems Paternal Grandfather     Rectal cancer Maternal Aunt     No Known Problems Maternal Aunt     No Known Problems Paternal Aunt     No Known Problems Paternal Aunt     No Known Problems Paternal Aunt     No Known Problems Paternal Aunt     No Known Problems Paternal Aunt     Coronary artery disease Cousin     Glaucoma Other     Colon cancer Neg Hx     Colon polyps Neg Hx        Review of Systems   Constitutional:  Negative for chills, diaphoresis, fatigue and fever.   Respiratory:  Negative for apnea, cough, chest tightness, shortness of breath and wheezing.    Cardiovascular:  Negative for chest pain, palpitations and leg swelling.   Gastrointestinal:  Negative for abdominal distention, abdominal pain, anal bleeding, constipation, diarrhea, nausea, rectal pain and vomiting.   Genitourinary:  Positive for  vaginal discharge. Negative for difficulty urinating, dyspareunia, dysuria, frequency, hematuria, menstrual problem, pelvic pain, urgency, vaginal bleeding and vaginal pain.   Musculoskeletal:  Negative for arthralgias, back pain and myalgias.   Skin:  Negative for color change and rash.   Neurological:  Negative for dizziness, syncope, light-headedness, numbness and headaches.   Hematological:  Negative for adenopathy. Does not bruise/bleed easily.   Psychiatric/Behavioral:  Negative for dysphoric mood and sleep disturbance. The patient is not nervous/anxious.        Objective   Physical Exam  OBGyn Exam     Objective      /62 (BP Location: Right arm, Patient Position: Sitting)   Wt 60.7 kg (133 lb 12.8 oz)   LMP 05/17/2024 (Approximate)   BMI 22.97 kg/m²     General:   alert and oriented, in no acute distress   Neck:    Breast:    Heart:    Lungs:    Abdomen: soft, non-tender, without masses or organomegaly   Vulva: Erythema noted diffusely extending from the vestibule to the labia minora/majora bilaterally, without focal or raised or ulcerative lesions   Vagina: Moderate amount of white discharge, slight erythema, without focal lesions.  Urethra somewhat prominent without any focal pathology appreciated.  Urethral opening slightly pronounced   Cervix: Small amount of white discharge, without lesions or cervicitis.  No CMT   Uterus: top normal size, anteverted, non-tender   Adnexa: no mass, fullness, tenderness   Rectum: deferred    Psych:  Normal mood and affect   Skin:  Without obvious lesions   Eyes: symmetric, with normal movements and reactivity   Musculoskeletal:  Normal muscle tone and movements appreciated

## 2024-06-07 NOTE — TELEPHONE ENCOUNTER
"Patient called in reporting increased vaginal itching and noticed urethra is swollen.  She denies any vaginal discharge or bleeding at this time.  She is requesting an appointment to be seen. Scheduled patient for appointment today.  Patient verbalized understanding and voiced appreciation for phone call.       Reason for Disposition  • Patient wants to be seen    Answer Assessment - Initial Assessment Questions  1. SYMPTOM: \"What's the main symptom you're concerned about?\" (e.g., pain, itching, dryness)      Itching and swollen   2. LOCATION: \"Where is the  itching located?\" (e.g., inside/outside, left/right)      outside  3. ONSET: \"When did the  itching  start?\"      2 days ago   4. PAIN: \"Is there any pain?\" If Yes, ask: \"How bad is it?\" (Scale: 1-10; mild, moderate, severe)      Denies   5. ITCHING: \"Is there any itching?\" If Yes, ask: \"How bad is it?\" (Scale: 1-10; mild, moderate, severe)      Mild-3/10  6. CAUSE: \"What do you think is causing the discharge?\" \"Have you had the same problem before? What happened then?\"      Possible yeast infection   7. OTHER SYMPTOMS: \"Do you have any other symptoms?\" (e.g., fever, itching, vaginal bleeding, pain with urination, injury to genital area, vaginal foreign body)      Denies   8. PREGNANCY: \"Is there any chance you are pregnant?\" \"When was your last menstrual period?\"      N/a    Protocols used: Vaginal Symptoms-ADULT-[place]    "

## 2024-06-18 ENCOUNTER — OFFICE VISIT (OUTPATIENT)
Dept: FAMILY MEDICINE CLINIC | Facility: HOSPITAL | Age: 49
End: 2024-06-18
Payer: COMMERCIAL

## 2024-06-18 VITALS
SYSTOLIC BLOOD PRESSURE: 110 MMHG | WEIGHT: 134 LBS | HEART RATE: 82 BPM | TEMPERATURE: 97.6 F | DIASTOLIC BLOOD PRESSURE: 76 MMHG | HEIGHT: 64 IN | BODY MASS INDEX: 22.88 KG/M2

## 2024-06-18 DIAGNOSIS — R53.82 CHRONIC FATIGUE: ICD-10-CM

## 2024-06-18 DIAGNOSIS — J01.40 ACUTE NON-RECURRENT PANSINUSITIS: ICD-10-CM

## 2024-06-18 DIAGNOSIS — Z00.00 ANNUAL PHYSICAL EXAM: Primary | ICD-10-CM

## 2024-06-18 LAB — S PYO AG THROAT QL: NEGATIVE

## 2024-06-18 PROCEDURE — 87880 STREP A ASSAY W/OPTIC: CPT | Performed by: NURSE PRACTITIONER

## 2024-06-18 PROCEDURE — 87070 CULTURE OTHR SPECIMN AEROBIC: CPT | Performed by: NURSE PRACTITIONER

## 2024-06-18 PROCEDURE — 99396 PREV VISIT EST AGE 40-64: CPT | Performed by: NURSE PRACTITIONER

## 2024-06-18 RX ORDER — PREDNISONE 10 MG/1
TABLET ORAL
Qty: 16 TABLET | Refills: 0 | Status: SHIPPED | OUTPATIENT
Start: 2024-06-18

## 2024-06-18 NOTE — PROGRESS NOTES
Adult Annual Physical  Name: Lynne Riley      : 1975      MRN: [id number]  Encounter Provider: RIANA Gold  Encounter Date: 2024   Encounter department: [facility] [id number]     Assessment & Plan   1. Annual physical exam  Comments:  PE updated, next due in 1 year; update mammo as scheduled; colonoscopy & gyn exam/pap UTD  2. Acute non-recurrent pansinusitis  Comments:  persistent sx's s/p antibiotic course, recommend taper of prednisone & use nasal steroid in addition; call if sx's persist/worsen - consider need for abx  Orders:  -     predniSONE 10 mg tablet; Take 4 tablets today then 3 tablets for 2 days, 2 tablets for 2 days, and 1 tablet for 2 days  -     POCT rapid ANTIGEN strepA  -     Throat culture; Future  -     Throat culture  3. Chronic fatigue  Comments:  eval further w/labs as ordered if sx's persist/worsen after course of prednisone  Orders:  -     Vitamin D 25 hydroxy; Future; Expected date: 2024  -     Vitamin B12; Future; Expected date: 2024  -     Cortisol Level, AM Specimen; Future; Expected date: 2024  -     Lyme Total AB W Reflex to IGM/IGG; Future; Expected date: 2024    Immunizations and preventive care screenings were discussed with patient today. Appropriate education was printed on patient's after visit summary.    Counseling:  Alcohol/drug use: discussed moderation in alcohol intake, the recommendations for healthy alcohol use, and avoidance of illicit drug use.  Dental Health: discussed importance of regular tooth brushing, flossing, and dental visits.  Injury prevention: discussed safety/seat belts, safety helmets, smoke detectors, carbon dioxide detectors, and smoking near bedding or upholstery.  Sexual health: discussed sexually transmitted diseases, partner selection, use of condoms, avoidance of unintended pregnancy, and contraceptive alternatives.  Exercise: the importance of regular exercise/physical  activity was discussed. Recommend exercise 3-5 times per week for at least 30 minutes.       Depression Screening and Follow-up Plan: Patient was screened for depression during today's encounter. They screened negative with a PHQ-2 score of 0.        History of Present Illness         Adult Annual Physical:  Patient presents for annual physical. Very pleasant 48 year old female coming in today for annual physical exam. She is recovering from lingering head cold after 6 weeks. She was treated w/ Augmentin x 7 days about a month ago. She still c/o recurrent cough, sinus pressure, dizziness, ear fullness, runny/dry nose, chest congestion, PND, chills on/off and ongoing fatigue. She has tried Advil sinus and congestion, OTC mucinex, and saline nasal irrigation. She was exposed to her son who had recent strep throat infection. Rapid strep in office neg. Culture sent out.   She follows regularly w/ surgical oncology for Hx. Breast CA 01/2019. She denies any fever, CP, SOB, syncope, n/v. .     Diet and Physical Activity:  - Diet/Nutrition: well balanced diet, portion control and adequate whole grain intake.  - Exercise: 5-7 times a week on average, strength training exercises and moderate cardiovascular exercise. has not been able to resume vigorous exercise for 6 wks due to increased fatigue w/recent URI    Depression Screening:  - PHQ-2 Score: 0    General Health:  - Sleep: 7-8 hours of sleep on average. denies snoring/apnic events  - Hearing: normal hearing right ear and normal hearing left ear.  - Vision: most recent eye exam > 1 year ago. reading glasses  - Dental: brushes teeth twice daily and regular dental visits.    /GYN Health:  - Follows with GYN: yes.   - Menopause: premenopausal.   - Last menstrual cycle: 6/11/2024.   - History of STDs: no  - Contraception:. n/a      Advanced Care Planning:  - Has an advanced directive?: no    - Has a durable medical POA?: no    - ACP document given to patient?: yes   "      Review of Systems   Constitutional:  Positive for activity change, chills and fatigue. Negative for fever and unexpected weight change.   HENT:  Positive for ear pain (B/L ear fullness), postnasal drip, rhinorrhea, sinus pressure and sinus pain. Negative for sore throat.    Respiratory:  Positive for cough (dry at night, producting in AM- yellow mucus). Negative for chest tightness, shortness of breath and wheezing.    Cardiovascular:  Positive for palpitations (spontaneous). Negative for chest pain and leg swelling.        Heart racing sensation w/ exertion      Gastrointestinal:  Positive for diarrhea (spontaneous- \"stress/hormone related\"). Negative for constipation, nausea and vomiting.   Genitourinary: Negative.         LMP 06/11/24     Musculoskeletal: Negative.    Skin: Negative.    Neurological:  Positive for dizziness and headaches. Negative for syncope and light-headedness.   Psychiatric/Behavioral: Negative.           Objective     /76   Pulse 82   Temp 97.6 °F (36.4 °C)   Ht 5' 4\" (1.626 m)   Wt 60.8 kg (134 lb)   LMP 05/17/2024 (Approximate)   Breastfeeding No   BMI 23.00 kg/m²       Physical Exam  Vitals reviewed.   Constitutional:       General: She is not in acute distress.     Appearance: Normal appearance. She is normal weight.   HENT:      Head: Normocephalic.      Right Ear: Tympanic membrane, ear canal and external ear normal.      Left Ear: Tympanic membrane, ear canal and external ear normal.      Nose: Congestion present. No rhinorrhea.      Mouth/Throat:      Mouth: Mucous membranes are moist.      Pharynx: Oropharynx is clear. Posterior oropharyngeal erythema present. No oropharyngeal exudate.   Eyes:      General: No scleral icterus.     Extraocular Movements: Extraocular movements intact.      Conjunctiva/sclera: Conjunctivae normal.      Pupils: Pupils are equal, round, and reactive to light.   Cardiovascular:      Rate and Rhythm: Normal rate and regular rhythm.      " Pulses: Normal pulses.      Heart sounds: Normal heart sounds. No murmur heard.     No gallop.   Pulmonary:      Effort: Pulmonary effort is normal.      Breath sounds: Normal breath sounds. No wheezing or rhonchi.   Abdominal:      General: Abdomen is flat. Bowel sounds are normal. There is no distension.      Palpations: Abdomen is soft.      Tenderness: There is no abdominal tenderness.   Musculoskeletal:         General: Normal range of motion.      Cervical back: Normal range of motion and neck supple. No tenderness.      Right lower leg: No edema.      Left lower leg: No edema.   Lymphadenopathy:      Cervical: No cervical adenopathy.   Skin:     General: Skin is warm and dry.   Neurological:      General: No focal deficit present.      Mental Status: She is alert and oriented to person, place, and time.      Motor: No weakness.   Psychiatric:         Mood and Affect: Mood normal.         Behavior: Behavior normal.         Thought Content: Thought content normal.         Administrative Statements   I have spent a total time of 30 minutes on 06/18/24 In caring for this patient including Diagnostic results, Risks and benefits of tx options, Instructions for management, Patient and family education, Impressions, Counseling / Coordination of care, Documenting in the medical record, Reviewing / ordering tests, medicine, procedures  , and Obtaining or reviewing history  .

## 2024-06-20 LAB — BACTERIA THROAT CULT: NORMAL

## 2024-07-01 DIAGNOSIS — Z00.6 ENCOUNTER FOR EXAMINATION FOR NORMAL COMPARISON OR CONTROL IN CLINICAL RESEARCH PROGRAM: ICD-10-CM

## 2024-07-16 ENCOUNTER — HOSPITAL ENCOUNTER (OUTPATIENT)
Dept: MAMMOGRAPHY | Facility: CLINIC | Age: 49
Discharge: HOME | End: 2024-07-16
Payer: COMMERCIAL

## 2024-07-16 ENCOUNTER — HOSPITAL ENCOUNTER (OUTPATIENT)
Dept: ULTRASOUND IMAGING | Facility: CLINIC | Age: 49
Discharge: HOME | End: 2024-07-16
Payer: COMMERCIAL

## 2024-07-16 DIAGNOSIS — Z86.000 PERSONAL HISTORY OF IN-SITU NEOPLASM OF BREAST: ICD-10-CM

## 2024-07-16 DIAGNOSIS — N63.11 MASS OF UPPER OUTER QUADRANT OF RIGHT BREAST: ICD-10-CM

## 2024-07-16 PROCEDURE — 77065 DX MAMMO INCL CAD UNI: CPT

## 2024-07-16 PROCEDURE — 76642 ULTRASOUND BREAST LIMITED: CPT

## 2024-07-16 PROCEDURE — G0279 TOMOSYNTHESIS, MAMMO: HCPCS

## 2024-07-22 DIAGNOSIS — Z13.21 ENCOUNTER FOR VITAMIN DEFICIENCY SCREENING: Primary | ICD-10-CM

## 2024-08-02 ENCOUNTER — HOSPITAL ENCOUNTER (OUTPATIENT)
Dept: MRI IMAGING | Facility: HOSPITAL | Age: 49
End: 2024-08-02
Payer: COMMERCIAL

## 2024-08-02 DIAGNOSIS — Z86.000 PERSONAL HISTORY OF IN-SITU NEOPLASM OF BREAST: ICD-10-CM

## 2024-08-02 PROCEDURE — C8937 CAD BREAST MRI: HCPCS

## 2024-08-02 PROCEDURE — A9585 GADOBUTROL INJECTION: HCPCS | Performed by: NURSE PRACTITIONER

## 2024-08-02 PROCEDURE — C8908 MRI W/O FOL W/CONT, BREAST,: HCPCS

## 2024-08-02 RX ORDER — GADOBUTROL 604.72 MG/ML
6 INJECTION INTRAVENOUS
Status: COMPLETED | OUTPATIENT
Start: 2024-08-02 | End: 2024-08-02

## 2024-08-02 RX ADMIN — GADOBUTROL 6 ML: 604.72 INJECTION INTRAVENOUS at 13:06

## 2024-08-05 ENCOUNTER — TELEPHONE (OUTPATIENT)
Dept: SURGICAL ONCOLOGY | Facility: CLINIC | Age: 49
End: 2024-08-05

## 2024-08-06 ENCOUNTER — TELEPHONE (OUTPATIENT)
Dept: SURGICAL ONCOLOGY | Facility: CLINIC | Age: 49
End: 2024-08-06

## 2024-08-08 ENCOUNTER — TELEPHONE (OUTPATIENT)
Dept: SURGICAL ONCOLOGY | Facility: CLINIC | Age: 49
End: 2024-08-08

## 2024-08-30 ENCOUNTER — HOSPITAL ENCOUNTER (OUTPATIENT)
Dept: ULTRASOUND IMAGING | Facility: CLINIC | Age: 49
Discharge: HOME | End: 2024-08-30
Payer: COMMERCIAL

## 2024-08-30 DIAGNOSIS — R92.8 ABNORMAL MAMMOGRAM: ICD-10-CM

## 2024-08-30 PROCEDURE — 76642 ULTRASOUND BREAST LIMITED: CPT

## 2024-08-30 NOTE — PROGRESS NOTES
Met with patient and Dr. Storm  regarding recommendation for;    ___x__ RIGHT ______LEFT      ___x__Ultrasound guided  ______Stereotactic breast biopsy.      __X___Verbalized understanding.    Reviewed clip placement with patient, pt states understanding: Yes: __x____ No: ______  Comments:    Blood thinners:  No: _x____ Yes: ______ What:          Biopsy teaching sheet given:  Yes: ___X___ No: ________    Pt given contact information and adv to call with any questions/needs

## 2024-09-20 ENCOUNTER — HOSPITAL ENCOUNTER (OUTPATIENT)
Dept: MAMMOGRAPHY | Facility: CLINIC | Age: 49
Discharge: HOME | End: 2024-09-20
Payer: COMMERCIAL

## 2024-09-20 ENCOUNTER — HOSPITAL ENCOUNTER (OUTPATIENT)
Dept: ULTRASOUND IMAGING | Facility: CLINIC | Age: 49
Discharge: HOME | End: 2024-09-20
Payer: COMMERCIAL

## 2024-09-20 VITALS — DIASTOLIC BLOOD PRESSURE: 77 MMHG | HEART RATE: 80 BPM | SYSTOLIC BLOOD PRESSURE: 116 MMHG

## 2024-09-20 DIAGNOSIS — R92.8 ABNORMAL MAMMOGRAM: ICD-10-CM

## 2024-09-20 DIAGNOSIS — R92.8 ABNORMAL FINDINGS ON DIAGNOSTIC IMAGING OF BREAST: ICD-10-CM

## 2024-09-20 PROCEDURE — 88305 TISSUE EXAM BY PATHOLOGIST: CPT | Performed by: SPECIALIST

## 2024-09-20 PROCEDURE — 88312 SPECIAL STAINS GROUP 1: CPT | Performed by: SPECIALIST

## 2024-09-20 PROCEDURE — 19084 BX BREAST ADD LESION US IMAG: CPT

## 2024-09-20 PROCEDURE — 88341 IMHCHEM/IMCYTCHM EA ADD ANTB: CPT | Performed by: SPECIALIST

## 2024-09-20 PROCEDURE — A4648 IMPLANTABLE TISSUE MARKER: HCPCS

## 2024-09-20 PROCEDURE — 88342 IMHCHEM/IMCYTCHM 1ST ANTB: CPT | Performed by: SPECIALIST

## 2024-09-20 PROCEDURE — 19083 BX BREAST 1ST LESION US IMAG: CPT

## 2024-09-20 RX ORDER — LIDOCAINE HYDROCHLORIDE 10 MG/ML
5 INJECTION, SOLUTION EPIDURAL; INFILTRATION; INTRACAUDAL; PERINEURAL ONCE
Status: COMPLETED | OUTPATIENT
Start: 2024-09-20 | End: 2024-09-20

## 2024-09-20 RX ADMIN — LIDOCAINE HYDROCHLORIDE 5 ML: 10 INJECTION, SOLUTION EPIDURAL; INFILTRATION; INTRACAUDAL; PERINEURAL at 11:04

## 2024-09-20 NOTE — PROGRESS NOTES
Progress Note -     Name: Lynne Riley 48 y.o. female I MRN: [id number]  Unit/Bed#:  I Date of Admission: 9/20/2024   Date of Service: 9/20/2024 I Hospital Day: 0     Assessment & Plan      Patient arrived via:    ___x__ambulatory    _____wheelchair    _____stretcher      Domestic violence screen    __x____negative______positive    Breast Implants:    _____x__yes ________no

## 2024-09-20 NOTE — PROGRESS NOTES
Progress Note -     Name: Lynne Riley 48 y.o. female I MRN: [id number]  Unit/Bed#:  I Date of Admission: 9/20/2024   Date of Service: 9/20/2024 I Hospital Day: 0     Assessment & Plan      Procedure type: Site # 1    ___x__ultrasound guided _____stereotactic    Breast:    _____Left ___x__Right    Location:11 o'clock 6cmfn    Needle: 14 gauge    # of passes: 3    Clip: Butterfly        Procedure type: Site #2    __x___ultrasound guided _____stereotactic    Breast:    _____Left ___x__Right    Location: 11 o'clock 4cmfn    Needle: 14 gauge    # of passes: 3    Clip: Dragonfly      Performed by: Dr. Lee    Pressure held for 5 minutes by: Ca Jang Strips:    __x___yes _____no    Band aid:    ___x__yes_____no    Tape and guaze:    _____yes _x____no    Tolerated procedure:    __x___yes _____no

## 2024-09-20 NOTE — PROGRESS NOTES
Progress Note -     Name: Lynne Riley 48 y.o. female I MRN: [id number]  Unit/Bed#:  I Date of Admission: 9/20/2024   Date of Service: 9/20/2024 I Hospital Day: 0     Assessment & Plan      Ice pack given:    ___x__yes _____no    Discharge instructions reviewed & given to patient:    __x___yes _____no    Discharged via:    ___x__ambulatory    _____wheelchair    _____stretcher    Stable on discharge:    __x___yes ____no

## 2024-09-24 ENCOUNTER — TELEPHONE (OUTPATIENT)
Dept: MAMMOGRAPHY | Facility: CLINIC | Age: 49
End: 2024-09-24

## 2024-09-24 PROCEDURE — 88312 SPECIAL STAINS GROUP 1: CPT | Performed by: SPECIALIST

## 2024-09-24 PROCEDURE — 88305 TISSUE EXAM BY PATHOLOGIST: CPT | Performed by: SPECIALIST

## 2024-09-24 PROCEDURE — 88341 IMHCHEM/IMCYTCHM EA ADD ANTB: CPT | Performed by: SPECIALIST

## 2024-09-24 PROCEDURE — 88342 IMHCHEM/IMCYTCHM 1ST ANTB: CPT | Performed by: SPECIALIST

## 2024-09-27 ENCOUNTER — TELEPHONE (OUTPATIENT)
Dept: SURGICAL ONCOLOGY | Facility: CLINIC | Age: 49
End: 2024-09-27

## 2024-09-27 NOTE — TELEPHONE ENCOUNTER
LVM informing patient of benign breast biopsy results. Instructed her to call with questions or concerns.

## 2024-09-30 ENCOUNTER — TELEPHONE (OUTPATIENT)
Dept: SURGICAL ONCOLOGY | Facility: CLINIC | Age: 49
End: 2024-09-30

## 2024-10-02 ENCOUNTER — TELEPHONE (OUTPATIENT)
Dept: SURGICAL ONCOLOGY | Facility: CLINIC | Age: 49
End: 2024-10-02

## 2024-11-04 NOTE — PROGRESS NOTES
AMB US Pelvic Non OB    Date/Time: 11/5/2024 2:45 PM    Performed by: Cristal Love  Authorized by: Zoey Yuen DO  Universal Protocol:  Consent: Verbal consent obtained.  Consent given by: patient  Timeout called at: 11/5/2024 2:58 PM.  Patient understanding: patient states understanding of the procedure being performed  Patient identity confirmed: verbally with patient    Procedure details:     SIS Procedure: Yes    Indications: non-obstetric vaginal bleeding      Technique:  Transvaginal US, Non-OB    Position: lithotomy exam    Uterine findings:     Length (cm): 10.02    Height (cm):  5.27    Width (cm):  6.84    Endometrial stripe: identified      Endometrium thickness (mm):  20.7  Left ovary findings:     Left ovary:  Visualized    Length (cm): 3.09    Height (cm): 1.99    Width (cm): 2  Right ovary findings:     Right ovary:  Visualized    Length (cm): 5.11    Height (cm): 4.13    Width (cm): 3.82  Other findings:     Free pelvic fluid: not identified      Free peritoneal fluid: not identified    Post-Procedure Details:     Impression:  Anteverted uterus demonstrates a thickened, echogenic endometrium, otherwise the uterus and left ovary appear within normal limits. The right ovary demonstrates a 2.0cm follicle and an adjacent cyst filled with debris,2.6cm, possibly an endometrioma. No free fluid.     Tolerance:  Tolerated well, no immediate complications    Complications: no complications    Additional Procedure Comments:      [place] F8 E8C-RS transvaginal transducer Serial # [id number] was used to perform the examination today and subsequently followed with high level disinfection utilizing Trophon EPR procedure.     Ultrasound performed at:     [facility]  [address] [place] [id number]  Phone:  [phone]  Fax:  [phone]    Sonohysterogram    Date/Time: 11/5/2024 2:45 PM    Performed by: Zoey Yuen DO  Authorized by: Zoey Yuen DO  Universal Protocol:  Consent given by:  patient  Timeout called at: 11/5/2024 2:58 PM.  Patient understanding: patient states understanding of the procedure being performed  Patient consent: the patient's understanding of the procedure matches consent given  Patient identity confirmed: verbally with patient    Pre-procedure:     Prepped with: Hibiclens    Procedure:     Cervix cleaned and prepped: yes      Tenaculum applied to cervix: yes      Uterus sounded: yes      Catheter inserted: yes      Uterine cavity distended with saline: yes    Post-procedure:     Patient observed: yes      Post procedure instructions given to patient: yes      Patient tolerated procedure well with no complications: yes    Comments:      Sonohysterogram demonstrates a smooth but thickened appearing endometrium.   Endometrial biopsy    Date/Time: 11/5/2024 2:45 PM    Performed by: Zoey Yuen DO  Authorized by: Zoey Yuen DO  Universal Protocol:  Consent: Verbal consent obtained.  Consent given by: patient  Timeout called at: 11/5/2024 2:58 PM.  Patient understanding: patient states understanding of the procedure being performed  Patient identity confirmed: verbally with patient    Indication:     Indications: Other disorder of menstruation and other abnormal bleeding from female genital tract    Procedure:     Procedure: endometrial biopsy with Pipelle      A bivalve speculum was placed in the vagina: yes      Cervix cleaned and prepped: yes      Specimen collected: specimen collected and sent to pathology      Patient tolerated procedure well with no complications: yes

## 2024-11-05 ENCOUNTER — PROCEDURE VISIT (OUTPATIENT)
Dept: GYNECOLOGY | Facility: CLINIC | Age: 49
End: 2024-11-05
Payer: COMMERCIAL

## 2024-11-05 ENCOUNTER — ULTRASOUND (OUTPATIENT)
Dept: GYNECOLOGY | Facility: CLINIC | Age: 49
End: 2024-11-05
Payer: COMMERCIAL

## 2024-11-05 DIAGNOSIS — N92.6 IRREGULAR MENSES: Primary | ICD-10-CM

## 2024-11-05 DIAGNOSIS — N93.9 ABNORMAL UTERINE BLEEDING (AUB): Primary | ICD-10-CM

## 2024-11-05 LAB — SL AMB POCT URINE HCG: NORMAL

## 2024-11-05 PROCEDURE — 81025 URINE PREGNANCY TEST: CPT | Performed by: OBSTETRICS & GYNECOLOGY

## 2024-11-05 PROCEDURE — 58340 CATHETER FOR HYSTEROGRAPHY: CPT | Performed by: OBSTETRICS & GYNECOLOGY

## 2024-11-05 PROCEDURE — 76831 ECHO EXAM UTERUS: CPT | Performed by: OBSTETRICS & GYNECOLOGY

## 2024-11-05 PROCEDURE — 88305 TISSUE EXAM BY PATHOLOGIST: CPT | Performed by: STUDENT IN AN ORGANIZED HEALTH CARE EDUCATION/TRAINING PROGRAM

## 2024-11-05 PROCEDURE — PBNCHG PB NO CHARGE PLACEHOLDER: Performed by: OBSTETRICS & GYNECOLOGY

## 2024-11-05 PROCEDURE — 58100 BIOPSY OF UTERUS LINING: CPT | Performed by: OBSTETRICS & GYNECOLOGY

## 2024-11-05 NOTE — PROGRESS NOTES
Sonohysterogram    Date/Time: 11/5/2024 3:00 PM    Performed by: Zoey Yuen DO  Authorized by: Zoey Yuen DO  Universal Protocol:  Consent: Verbal consent obtained. Written consent obtained.  Risks and benefits: risks, benefits and alternatives were discussed  Consent given by: patient  Patient identity confirmed: verbally with patient    Pre-procedure:     Prepped with: Hibiclens    Procedure:     Cervix cleaned and prepped: yes      Cervix dilated: no      Tenaculum applied to cervix: yes      Uterus sounded: yes      Uterus sound depth (cm):  8    Catheter inserted: yes      Uterine cavity distended with saline: yes    Post-procedure:     Patient observed: yes      Patient observation time:  30 minutes    No complications: yes      Estimated blood loss (mL):  0    Post procedure instructions given to patient: yes      Patient tolerated procedure well with no complications: yes    Comments:      Pt here for sonohysterogram.  She has been having shortened cycles.  She messaged me stating that she has had 3, 18-day cycles in a row, each time bleeding 5 or 6 days.  Times, it is heavy.  She feels tired all the time.  She had a normal CBC in June and a normal TSH at that time as well.    Sonohysterogram shows a thick endometrium, 20 mm, no filling defects.  On the left ovary, there is a 2.6 cm endometrioma which is less than what was noted on her last ultrasound from a year ago when it measured 4.3 cm.  Biopsy done without difficulty.  Will await results.    We briefly discussed treatment of heavy and shortened cycles.  She is not a candidate for any hormonal treatment due to her history of breast cancer.  We briefly discussed ablation although she is not an ideal candidate for this.  She is actually somewhat interested in hysterectomy.  We did briefly discuss this and also discussed salpingectomy and would have to make a decision regarding removal of her ovaries.  Most likely these would be retained.  She will  give it some thought and let us know if she has any questions when we call her with her biopsy results.

## 2024-11-07 PROCEDURE — 88305 TISSUE EXAM BY PATHOLOGIST: CPT | Performed by: STUDENT IN AN ORGANIZED HEALTH CARE EDUCATION/TRAINING PROGRAM

## 2024-11-17 ENCOUNTER — RESULTS FOLLOW-UP (OUTPATIENT)
Dept: GYNECOLOGY | Facility: CLINIC | Age: 49
End: 2024-11-17

## 2025-03-20 ENCOUNTER — TELEPHONE (OUTPATIENT)
Age: 50
End: 2025-03-20

## 2025-03-20 NOTE — TELEPHONE ENCOUNTER
Called patient and left a voicemail message to call me back to reschedule her appointment on 4/29 at 1pm with Dr. Mariel FARR.

## 2025-04-04 ENCOUNTER — ANNUAL EXAM (OUTPATIENT)
Dept: GYNECOLOGY | Facility: CLINIC | Age: 50
End: 2025-04-04
Payer: COMMERCIAL

## 2025-04-04 VITALS
DIASTOLIC BLOOD PRESSURE: 62 MMHG | WEIGHT: 137 LBS | BODY MASS INDEX: 23.39 KG/M2 | HEIGHT: 64 IN | SYSTOLIC BLOOD PRESSURE: 116 MMHG

## 2025-04-04 DIAGNOSIS — N95.1 PERIMENOPAUSE: ICD-10-CM

## 2025-04-04 DIAGNOSIS — N80.122: ICD-10-CM

## 2025-04-04 DIAGNOSIS — N92.0 MENORRHAGIA WITH REGULAR CYCLE: ICD-10-CM

## 2025-04-04 DIAGNOSIS — Z01.419 ENCOUNTER FOR ANNUAL ROUTINE GYNECOLOGICAL EXAMINATION: Primary | ICD-10-CM

## 2025-04-04 PROCEDURE — S0612 ANNUAL GYNECOLOGICAL EXAMINA: HCPCS | Performed by: OBSTETRICS & GYNECOLOGY

## 2025-04-04 NOTE — PROGRESS NOTES
She is due for an ultrasound.  Name: Lynne Riley      : 1975      MRN: [id number]  Encounter Provider: Zoey Yuen DO  Encounter Date: 2025   Encounter department: [facility]  :  Assessment & Plan  Encounter for annual routine gynecological examination         Endometrioma of left ovary         Menorrhagia with regular cycle         Perimenopause         pap is up to date    mammogram reviewed with her including breast density.  Mammogram ordered by breast surgery.     Discussed self breast exams    colon cancer screening - 2021 repeat in 5 years    Heavy menstrual cycles-we have discussed this several times.  We did briefly discuss an endometrial ablation although she is not interested in this.  She is not sure that she would like any treatment at this time but if she does decide to pursue treatment, she would prefer hysterectomy.  I think this is reasonable.  She does have a history of endometriosis.  She had questions about removing her ovaries.  I stated that typically if endometriosis is causing a great deal of pain then it would be recommended that the ovaries are removed at the time of hysterectomy.  If she is not having pain in the endometriosis is not severe at the time of hysterectomy, they could be retained.  She would not be a candidate for HRT so this would have to be a very  carefully considered decision.  She will continue to watch her cycles and call with any concerns    Perimenopause-we discussed symptoms that she is having and options for treatment.  Ultrasound will look at her ovaries as she is having bloating.    Endometrioma - ultrasound ordered.  This was smaller at the last check in November.    discussed preventive care, regular exercise and a healthy diet      History of Present Illness   HPI  Lynne Riley is a 49 y.o. female who presents yearly.  She had a normal endometrial biopsy in November.  She was having shortened cycles, 18 days.  It  "was heavy at times.  CBC and TSH were normal.  She did have an endometrioma which was smaller on the ultrasound in November.  She is due for an ultrasound to check the endometrioma.    Now, her menstrual cycles are every 28 days, bleeds for 4-7 days.  Often heavy but not every month.  During the heavy days she changes every 2 hours  She feels tired with her menstrual cycle.  She was not anemic on her most recent labs  She has bloating at times and feels that it is difficult to lose weight.    She has a history of breast cancer.  Benign breast biopsy in September.  She can return for a bilateral diagnostic mammogram in 1 year which will be in July.  She sees breast surgery    Normal Pap, negative HPV in August 2023    Endometriosis dx by [facility]        Review of Systems   Constitutional: Negative.    Gastrointestinal: Negative.    Genitourinary:  Positive for menstrual problem.          Objective   /62 (BP Location: Left arm, Patient Position: Sitting)   Ht 5' 4\" (1.626 m)   Wt 62.1 kg (137 lb)   LMP 03/18/2025 (Exact Date)   BMI 23.52 kg/m²      Physical Exam  Vitals and nursing note reviewed. Exam conducted with a chaperone present.   Constitutional:       Appearance: She is well-developed.   HENT:      Head: Normocephalic and atraumatic.   Neck:      Thyroid: No thyromegaly.   Cardiovascular:      Rate and Rhythm: Normal rate and regular rhythm.   Pulmonary:      Effort: Pulmonary effort is normal.      Breath sounds: Normal breath sounds.   Chest:   Breasts:     Right: Normal.      Left: Normal.      Comments: Examined seated and supine  Abdominal:      Palpations: Abdomen is soft.   Genitourinary:     General: Normal vulva.      Vagina: Normal.      Cervix: Normal.      Uterus: Normal.       Adnexa: Right adnexa normal and left adnexa normal.      Rectum: Normal.   Musculoskeletal:      Cervical back: Neck supple.   Skin:     General: Skin is warm and dry.   Neurological:      Mental Status: She is alert. "   Psychiatric:         Mood and Affect: Mood normal.

## 2025-04-21 ENCOUNTER — TELEMEDICINE (OUTPATIENT)
Age: 50
End: 2025-04-21
Payer: COMMERCIAL

## 2025-04-21 DIAGNOSIS — R68.82 LOW LIBIDO: ICD-10-CM

## 2025-04-21 DIAGNOSIS — R53.82 CHRONIC FATIGUE: Primary | ICD-10-CM

## 2025-04-21 PROBLEM — T85.44XA CAPSULAR CONTRACTURE OF BREAST IMPLANT: Status: RESOLVED | Noted: 2019-12-18 | Resolved: 2025-04-21

## 2025-04-21 PROBLEM — Z08 ENCOUNTER FOR FOLLOW-UP SURVEILLANCE OF BREAST CANCER: Status: RESOLVED | Noted: 2020-06-08 | Resolved: 2025-04-21

## 2025-04-21 PROBLEM — R13.10 DYSPHAGIA: Status: RESOLVED | Noted: 2021-03-16 | Resolved: 2025-04-21

## 2025-04-21 PROBLEM — R10.13 EPIGASTRIC PRESSURE: Status: RESOLVED | Noted: 2021-03-16 | Resolved: 2025-04-21

## 2025-04-21 PROBLEM — Z12.39 BREAST CANCER SCREENING, HIGH RISK PATIENT: Status: RESOLVED | Noted: 2019-08-20 | Resolved: 2025-04-21

## 2025-04-21 PROBLEM — K64.0 GRADE I HEMORRHOIDS: Status: RESOLVED | Noted: 2021-07-21 | Resolved: 2025-04-21

## 2025-04-21 PROBLEM — Z85.3 ENCOUNTER FOR FOLLOW-UP SURVEILLANCE OF BREAST CANCER: Status: RESOLVED | Noted: 2020-06-08 | Resolved: 2025-04-21

## 2025-04-21 PROBLEM — R92.8 ABNORMAL MRI, BREAST: Status: RESOLVED | Noted: 2021-07-22 | Resolved: 2025-04-21

## 2025-04-21 PROBLEM — R92.8 ABNORMAL MAMMOGRAM OF RIGHT BREAST: Status: RESOLVED | Noted: 2023-01-19 | Resolved: 2025-04-21

## 2025-04-21 PROBLEM — R92.30 DENSE BREAST TISSUE: Status: RESOLVED | Noted: 2019-02-27 | Resolved: 2025-04-21

## 2025-04-21 PROCEDURE — 99215 OFFICE O/P EST HI 40 MIN: CPT | Performed by: OBSTETRICS & GYNECOLOGY

## 2025-04-22 NOTE — PROGRESS NOTES
"Virtual Regular VisitName: Lynne Riley      : 1975      MRN: [id number]  Encounter Provider: Mariel Bonilla MD  Encounter Date: 2025   [facility] [place]  :  Assessment & Plan  Chronic fatigue    Orders:    Cortisol Level, AM Specimen; Future    DHEA-sulfate; Future    Low libido    Orders:    Testosterone; Future    1) This was a lengthy visit spent discussing the HPATG (hypothalamus/pituitary/adrenal/thyroid/gonadal) axis and the impact that hormonal deviation in one gland can have on another. It is not uncommon for ovarian declined to coincide or invoke thyroid and adrenal dysfunction as well.  2) While she is barely perimenopausal, her actual symptoms speak of adrenal dysfunction, given many stressors she has been dealing with. Offered adrenal axis testing, accepted  3) My mainstay for treatment of breast cancer survivor is with testosterone with/ without AI per oncology recommendations. I will send letter with references to oncology as well as to her.   4) Libido is a testosterone concern, as is decreased metabolism and fatigue. Testosterone supplementation discussed in detail, including lack of FDA approval for women and cost concerns, as insurance will not reimburse. Side effects include: increased libido, increased muscle mass, decreased fat mass, erythrocytosis ( NOT polycythemia rubra), increased energy, acne, increased hair growth or loss.  5) She is interested in this. I will notify her of results after my vacation, and send in testosterone cream then if appropriate.  I reminded her that it often takes a \" pharmacologic level\" which is much higher than a \" physiologic level\" for results, and that libido often takes 2-3 months to improve.   6) Email with progress report monthly, titrating doses as needed. Follow up prn or one year.     This was a 50 minute visit with greater than 50% of time spent in face to face counseling and " coordination of care            Lynne presents for hormone consult, her first visit with me; self referred, sees Dr. Yuen with [facility]  Lynne is perimenopausal, still with regular monthly periods, some very heavy, some with moderate flow. Male factor infertility for contraception. 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   2) Chronic fatigue. 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 Dad COD killed in accident. PGM COPD; PGF AMI. 4 siblings, healthy. 1 son, 12, healthy    Review of Systems   Constitutional:  Positive for fatigue and unexpected weight change.   Gastrointestinal: Negative.    Endocrine: Negative.    Genitourinary:  Positive for menstrual problem.        Decreased libido   Musculoskeletal: Negative.    Skin: Negative.    Neurological: Negative.    Psychiatric/Behavioral:  Positive for decreased concentration and dysphoric mood.          Physical Exam    Administrative Statements   Encounter provider Mariel Bonilla MD    The Patient is located at Home and in the following state in which I hold an active license [place].    The patient was identified by name and date of birth. Lynne Riley was informed that this is a telemedicine visit and that the visit is being conducted through the Epic Embedded platform. She agrees to proceed..  My office door was closed. No one else was in the room.  She acknowledged consent and understanding of privacy and security of the video platform. The patient has agreed to participate and understands they can discontinue the visit at any time.    I have spent a total time of 50 minutes in caring for this patient  on the day of the visit/encounter including Impressions, Counseling / Coordination of care, Documenting in the medical record, Reviewing/placing orders in the medical record (including tests, medications, and/or procedures), Obtaining or reviewing history  , and Communicating with other healthcare professionals , not including the time spent for establishing the audio/video connection.

## 2025-05-01 ENCOUNTER — APPOINTMENT (OUTPATIENT)
Dept: LAB | Facility: HOSPITAL | Age: 50
End: 2025-05-01
Payer: COMMERCIAL

## 2025-05-01 DIAGNOSIS — Z00.8 HEALTH EXAMINATION IN POPULATION SURVEY: ICD-10-CM

## 2025-05-01 DIAGNOSIS — N93.9 ABNORMAL UTERINE BLEEDING (AUB): ICD-10-CM

## 2025-05-01 DIAGNOSIS — R53.82 CHRONIC FATIGUE: ICD-10-CM

## 2025-05-01 DIAGNOSIS — Z13.1 SCREENING FOR DIABETES MELLITUS: ICD-10-CM

## 2025-05-01 DIAGNOSIS — Z13.21 ENCOUNTER FOR VITAMIN DEFICIENCY SCREENING: ICD-10-CM

## 2025-05-01 DIAGNOSIS — R68.82 LOW LIBIDO: ICD-10-CM

## 2025-05-01 LAB
25(OH)D3 SERPL-MCNC: 25.8 NG/ML (ref 30–100)
CHOLEST SERPL-MCNC: 190 MG/DL (ref ?–200)
CORTIS AM PEAK SERPL-MCNC: 10.7 UG/DL (ref 6.7–22.6)
ERYTHROCYTE [DISTWIDTH] IN BLOOD BY AUTOMATED COUNT: 12.4 % (ref 11.6–15.1)
EST. AVERAGE GLUCOSE BLD GHB EST-MCNC: 97 MG/DL
HBA1C MFR BLD: 5 %
HCT VFR BLD AUTO: 37.6 % (ref 34.8–46.1)
HDLC SERPL-MCNC: 59 MG/DL
HGB BLD-MCNC: 12 G/DL (ref 11.5–15.4)
IRON SATN MFR SERPL: 8 % (ref 15–50)
IRON SERPL-MCNC: 34 UG/DL (ref 50–212)
LDLC SERPL CALC-MCNC: 119 MG/DL (ref 0–100)
MAGNESIUM SERPL-MCNC: 2 MG/DL (ref 1.9–2.7)
MCH RBC QN AUTO: 28 PG (ref 26.8–34.3)
MCHC RBC AUTO-ENTMCNC: 31.9 G/DL (ref 31.4–37.4)
MCV RBC AUTO: 88 FL (ref 82–98)
NONHDLC SERPL-MCNC: 131 MG/DL
PLATELET # BLD AUTO: 226 THOUSANDS/UL (ref 149–390)
PMV BLD AUTO: 11.8 FL (ref 8.9–12.7)
RBC # BLD AUTO: 4.28 MILLION/UL (ref 3.81–5.12)
TESTOST SERPL-MSCNC: <10 NG/DL (ref 0–75)
TIBC SERPL-MCNC: 420 UG/DL (ref 250–450)
TRANSFERRIN SERPL-MCNC: 300 MG/DL (ref 203–362)
TRIGL SERPL-MCNC: 58 MG/DL (ref ?–150)
UIBC SERPL-MCNC: 386 UG/DL (ref 155–355)
VIT B12 SERPL-MCNC: 365 PG/ML (ref 180–914)
WBC # BLD AUTO: 5.19 THOUSAND/UL (ref 4.31–10.16)

## 2025-05-01 PROCEDURE — 82607 VITAMIN B-12: CPT

## 2025-05-01 PROCEDURE — 84403 ASSAY OF TOTAL TESTOSTERONE: CPT

## 2025-05-01 PROCEDURE — 85027 COMPLETE CBC AUTOMATED: CPT

## 2025-05-01 PROCEDURE — 82533 TOTAL CORTISOL: CPT

## 2025-05-01 PROCEDURE — 86618 LYME DISEASE ANTIBODY: CPT

## 2025-05-01 PROCEDURE — 83735 ASSAY OF MAGNESIUM: CPT

## 2025-05-01 PROCEDURE — 82306 VITAMIN D 25 HYDROXY: CPT

## 2025-05-01 PROCEDURE — 83036 HEMOGLOBIN GLYCOSYLATED A1C: CPT

## 2025-05-01 PROCEDURE — 82627 DEHYDROEPIANDROSTERONE: CPT

## 2025-05-01 PROCEDURE — 83540 ASSAY OF IRON: CPT

## 2025-05-01 PROCEDURE — 83550 IRON BINDING TEST: CPT

## 2025-05-01 PROCEDURE — 36415 COLL VENOUS BLD VENIPUNCTURE: CPT

## 2025-05-01 PROCEDURE — 80061 LIPID PANEL: CPT

## 2025-05-02 ENCOUNTER — RESULTS FOLLOW-UP (OUTPATIENT)
Dept: FAMILY MEDICINE CLINIC | Facility: HOSPITAL | Age: 50
End: 2025-05-02

## 2025-05-02 ENCOUNTER — RESULTS FOLLOW-UP (OUTPATIENT)
Age: 50
End: 2025-05-02

## 2025-05-02 LAB
B BURGDOR IGG+IGM SER QL IA: NEGATIVE
DHEA-S SERPL-MCNC: 78.2 UG/DL (ref 41.2–243.7)

## 2025-05-05 ENCOUNTER — PATIENT MESSAGE (OUTPATIENT)
Dept: GYNECOLOGY | Facility: CLINIC | Age: 50
End: 2025-05-05

## 2025-05-05 DIAGNOSIS — E61.1 LOW SERUM IRON: Primary | ICD-10-CM

## 2025-05-05 DIAGNOSIS — R53.82 CHRONIC FATIGUE: ICD-10-CM

## 2025-05-05 DIAGNOSIS — R68.82 DECREASED LIBIDO: Primary | ICD-10-CM

## 2025-05-05 NOTE — PROGRESS NOTES
E-Consult Consent: Patient aware and consented that a consult is being performed on their behalf to hematology. The patient is aware that they may not see the provider face to face, but the provider may review their medical record and give recommendations. The patient may elect to see the provider in person if requested.

## 2025-05-06 ENCOUNTER — E-CONSULT (OUTPATIENT)
Dept: HEMATOLOGY ONCOLOGY | Facility: CLINIC | Age: 50
End: 2025-05-06
Payer: COMMERCIAL

## 2025-05-06 DIAGNOSIS — D50.0 IRON DEFICIENCY ANEMIA DUE TO CHRONIC BLOOD LOSS: Primary | ICD-10-CM

## 2025-05-06 DIAGNOSIS — E61.1 IRON DEFICIENCY: Primary | ICD-10-CM

## 2025-05-06 PROCEDURE — 99446 NTRPROF PH1/NTRNET/EHR 5-10: CPT

## 2025-05-06 RX ORDER — SODIUM CHLORIDE 9 MG/ML
20 INJECTION, SOLUTION INTRAVENOUS ONCE
OUTPATIENT
Start: 2025-05-06

## 2025-05-06 NOTE — PROGRESS NOTES
E-Consult  Lynne Riley 49 y.o. female MRN: [id number]  Encounter Date: 05/06/25      Reason for Consult / Principal Problem: Patient wondering if she is a candidate for iron infusion given most recent CBC and iron panel results? previously intolerant to oral iron supplement with GI upset and constipation     Consulting Provider: RIANA Plata    Requesting Provider: Chris Gómez CRNP       ASSESSMENT:  Lynne Riley is a 49-year-old female with iron deficiency with serum iron 34, iron saturation 8%.  Patient's hemoglobin stable at 12.0 g/dL.  Per provider, patient unable to tolerate oral iron due to GI upset.    Patient did have her GI studies 4/7/2021 (EGD/Colonoscopy), which were essentially normal.    RECOMMENDATIONS:  Assess for bleeding, menstrual cycle or any abnormal bleeding to determine the root cause of iron deficiency  Since patient is not able to tolerate oral iron supplements, recommend IV iron, Venofer 300 mg weekly x 4 doses.  Please review side effects of IV iron, which include, but not limited to,   Anaphylaxis/allergic reaction, arthralgias/myalgias, nausea; GI upset, etc  Re-assess counts 3-4 months after treatment with IV iron    Total time spent 5-10 minutes, >50% of the total time devoted to medical consultative verbal/EMR discussion between providers. Written report will be generated in the EMR. .

## 2025-05-19 ENCOUNTER — HOSPITAL ENCOUNTER (OUTPATIENT)
Dept: ULTRASOUND IMAGING | Facility: HOSPITAL | Age: 50
Discharge: HOME | End: 2025-05-19
Attending: OBSTETRICS & GYNECOLOGY
Payer: COMMERCIAL

## 2025-05-19 DIAGNOSIS — N80.122: ICD-10-CM

## 2025-05-19 DIAGNOSIS — D50.0 IRON DEFICIENCY ANEMIA DUE TO CHRONIC BLOOD LOSS: Primary | ICD-10-CM

## 2025-05-19 PROCEDURE — 76856 US EXAM PELVIC COMPLETE: CPT

## 2025-05-19 PROCEDURE — 76830 TRANSVAGINAL US NON-OB: CPT

## 2025-05-19 RX ORDER — SODIUM CHLORIDE 9 MG/ML
20 INJECTION, SOLUTION INTRAVENOUS ONCE
Status: CANCELLED | OUTPATIENT
Start: 2025-05-21

## 2025-05-21 ENCOUNTER — HOSPITAL ENCOUNTER (OUTPATIENT)
Dept: INFUSION CENTER | Facility: HOSPITAL | Age: 50
Discharge: HOME | End: 2025-05-21
Attending: FAMILY MEDICINE
Payer: COMMERCIAL

## 2025-05-21 VITALS
SYSTOLIC BLOOD PRESSURE: 114 MMHG | RESPIRATION RATE: 18 BRPM | DIASTOLIC BLOOD PRESSURE: 76 MMHG | HEART RATE: 90 BPM | OXYGEN SATURATION: 100 % | TEMPERATURE: 97.1 F

## 2025-05-21 DIAGNOSIS — D50.0 IRON DEFICIENCY ANEMIA DUE TO CHRONIC BLOOD LOSS: Primary | ICD-10-CM

## 2025-05-21 RX ORDER — SODIUM CHLORIDE 9 MG/ML
20 INJECTION, SOLUTION INTRAVENOUS ONCE
OUTPATIENT
Start: 2025-05-28

## 2025-05-21 RX ORDER — SODIUM CHLORIDE 9 MG/ML
20 INJECTION, SOLUTION INTRAVENOUS ONCE
Status: COMPLETED | OUTPATIENT
Start: 2025-05-21 | End: 2025-05-21

## 2025-05-21 RX ADMIN — IRON SUCROSE 300 MG: 20 INJECTION, SOLUTION INTRAVENOUS at 14:06

## 2025-05-21 RX ADMIN — SODIUM CHLORIDE 20 ML/HR: 9 INJECTION, SOLUTION INTRAVENOUS at 14:05

## 2025-05-21 NOTE — PROGRESS NOTES
Lynne Riley  tolerated treatment well with no complications.      Lynne Riley is aware of future appt on 6/4 at 12:30.     AVS printed and given to Lynne Riley:  No (Declined by Lynne Riley)      normal...

## 2025-05-21 NOTE — PLAN OF CARE
Problem: Knowledge Deficit  Goal: Patient/family/caregiver demonstrates understanding of disease process, treatment plan, medications, and discharge instructions  Description: Complete learning assessment and assess knowledge base.  Interventions:  - Provide teaching at level of understanding  - Provide teaching via preferred learning methods  5/21/2025 1346 by Ca Chavez RN  Outcome: Progressing  5/21/2025 1346 by Ca Chavez RN  Outcome: Progressing

## 2025-05-28 ENCOUNTER — RESULTS FOLLOW-UP (OUTPATIENT)
Dept: GYNECOLOGY | Facility: CLINIC | Age: 50
End: 2025-05-28

## 2025-05-28 NOTE — TELEPHONE ENCOUNTER
Attempted to call patient with results, unable to to reach her by phone. Left a non-detailed voicemail to ryan the office back at [phone].

## 2025-05-29 DIAGNOSIS — D50.0 IRON DEFICIENCY ANEMIA DUE TO CHRONIC BLOOD LOSS: Primary | ICD-10-CM

## 2025-05-29 RX ORDER — SODIUM CHLORIDE 9 MG/ML
20 INJECTION, SOLUTION INTRAVENOUS ONCE
Status: CANCELLED | OUTPATIENT
Start: 2025-05-30

## 2025-05-30 ENCOUNTER — HOSPITAL ENCOUNTER (OUTPATIENT)
Dept: INFUSION CENTER | Facility: CLINIC | Age: 50
Discharge: HOME | End: 2025-05-30
Attending: FAMILY MEDICINE
Payer: COMMERCIAL

## 2025-05-30 VITALS
RESPIRATION RATE: 18 BRPM | OXYGEN SATURATION: 100 % | SYSTOLIC BLOOD PRESSURE: 101 MMHG | HEART RATE: 68 BPM | TEMPERATURE: 97.4 F | DIASTOLIC BLOOD PRESSURE: 68 MMHG

## 2025-05-30 DIAGNOSIS — D50.0 IRON DEFICIENCY ANEMIA DUE TO CHRONIC BLOOD LOSS: Primary | ICD-10-CM

## 2025-05-30 PROCEDURE — 96365 THER/PROPH/DIAG IV INF INIT: CPT

## 2025-05-30 RX ORDER — SODIUM CHLORIDE 9 MG/ML
20 INJECTION, SOLUTION INTRAVENOUS ONCE
Status: COMPLETED | OUTPATIENT
Start: 2025-05-30 | End: 2025-05-30

## 2025-05-30 RX ORDER — SODIUM CHLORIDE 9 MG/ML
20 INJECTION, SOLUTION INTRAVENOUS ONCE
Status: CANCELLED | OUTPATIENT
Start: 2025-06-07

## 2025-05-30 RX ADMIN — IRON SUCROSE 300 MG: 20 INJECTION, SOLUTION INTRAVENOUS at 13:04

## 2025-05-30 RX ADMIN — SODIUM CHLORIDE 20 ML/HR: 0.9 INJECTION, SOLUTION INTRAVENOUS at 13:04

## 2025-05-30 NOTE — PROGRESS NOTES
Pt to clinic for venofer infusion. Pt tolerated without complications. Next appointment June 7 at 8:30

## 2025-06-07 ENCOUNTER — HOSPITAL ENCOUNTER (OUTPATIENT)
Dept: INFUSION CENTER | Facility: CLINIC | Age: 50
Discharge: HOME | End: 2025-06-07
Attending: FAMILY MEDICINE
Payer: COMMERCIAL

## 2025-06-07 VITALS
RESPIRATION RATE: 18 BRPM | SYSTOLIC BLOOD PRESSURE: 94 MMHG | TEMPERATURE: 97.4 F | OXYGEN SATURATION: 100 % | DIASTOLIC BLOOD PRESSURE: 65 MMHG | HEART RATE: 78 BPM

## 2025-06-07 DIAGNOSIS — D50.0 IRON DEFICIENCY ANEMIA DUE TO CHRONIC BLOOD LOSS: Primary | ICD-10-CM

## 2025-06-07 PROCEDURE — 96365 THER/PROPH/DIAG IV INF INIT: CPT

## 2025-06-07 RX ORDER — SODIUM CHLORIDE 9 MG/ML
20 INJECTION, SOLUTION INTRAVENOUS ONCE
Status: CANCELLED | OUTPATIENT
Start: 2025-06-13

## 2025-06-07 RX ORDER — SODIUM CHLORIDE 9 MG/ML
20 INJECTION, SOLUTION INTRAVENOUS ONCE
Status: COMPLETED | OUTPATIENT
Start: 2025-06-07 | End: 2025-06-07

## 2025-06-07 RX ADMIN — IRON SUCROSE 300 MG: 20 INJECTION, SOLUTION INTRAVENOUS at 08:41

## 2025-06-07 RX ADMIN — SODIUM CHLORIDE 20 ML/HR: 0.9 INJECTION, SOLUTION INTRAVENOUS at 08:41

## 2025-06-13 ENCOUNTER — HOSPITAL ENCOUNTER (OUTPATIENT)
Dept: INFUSION CENTER | Facility: CLINIC | Age: 50
End: 2025-06-13
Attending: FAMILY MEDICINE
Payer: COMMERCIAL

## 2025-06-13 VITALS
OXYGEN SATURATION: 99 % | SYSTOLIC BLOOD PRESSURE: 98 MMHG | RESPIRATION RATE: 17 BRPM | TEMPERATURE: 97.2 F | DIASTOLIC BLOOD PRESSURE: 68 MMHG | HEART RATE: 84 BPM

## 2025-06-13 DIAGNOSIS — D50.0 IRON DEFICIENCY ANEMIA DUE TO CHRONIC BLOOD LOSS: Primary | ICD-10-CM

## 2025-06-13 PROCEDURE — 96365 THER/PROPH/DIAG IV INF INIT: CPT

## 2025-06-13 PROCEDURE — 96376 TX/PRO/DX INJ SAME DRUG ADON: CPT

## 2025-06-13 RX ORDER — SODIUM CHLORIDE 9 MG/ML
20 INJECTION, SOLUTION INTRAVENOUS ONCE
Status: COMPLETED | OUTPATIENT
Start: 2025-06-13 | End: 2025-06-13

## 2025-06-13 RX ORDER — SODIUM CHLORIDE 9 MG/ML
20 INJECTION, SOLUTION INTRAVENOUS ONCE
Status: CANCELLED | OUTPATIENT
Start: 2025-06-14

## 2025-06-13 RX ADMIN — SODIUM CHLORIDE 20 ML/HR: 0.9 INJECTION, SOLUTION INTRAVENOUS at 09:25

## 2025-06-13 RX ADMIN — IRON SUCROSE 300 MG: 20 INJECTION, SOLUTION INTRAVENOUS at 09:35

## 2025-06-13 NOTE — PROGRESS NOTES
Patient complaining of Nausea / Increased Heart Rate / Tingling in Extremities: Infusion stopped: Hypersensitivity Protocol denied: NSS infusing

## 2025-06-13 NOTE — PROGRESS NOTES
Tolerated remainder of infusion without incident: No adverse reactions noted: No further appt's scheduled: Treatment complete: AVS offered and declined

## 2025-06-13 NOTE — PROGRESS NOTES
Patient to Infusion Center for Venofer: Offers no complaints at present time: Lab work ( 05/01/25 ) reviewed: Iron - 34: Within parameters to treat: Right AC PIV initiated without incident

## 2025-06-18 ENCOUNTER — PATIENT MESSAGE (OUTPATIENT)
Age: 50
End: 2025-06-18

## 2025-06-19 DIAGNOSIS — R68.82 DECREASED LIBIDO: ICD-10-CM

## 2025-06-19 DIAGNOSIS — R53.82 CHRONIC FATIGUE: ICD-10-CM

## 2025-07-03 ENCOUNTER — OFFICE VISIT (OUTPATIENT)
Age: 50
End: 2025-07-03
Payer: COMMERCIAL

## 2025-07-03 VITALS
SYSTOLIC BLOOD PRESSURE: 110 MMHG | BODY MASS INDEX: 22.74 KG/M2 | DIASTOLIC BLOOD PRESSURE: 72 MMHG | HEIGHT: 64 IN | WEIGHT: 133.2 LBS

## 2025-07-03 DIAGNOSIS — N92.1 MENORRHAGIA WITH IRREGULAR CYCLE: Primary | ICD-10-CM

## 2025-07-03 PROCEDURE — 99214 OFFICE O/P EST MOD 30 MIN: CPT | Performed by: OBSTETRICS & GYNECOLOGY

## 2025-07-03 RX ORDER — SODIUM CHLORIDE, SODIUM LACTATE, POTASSIUM CHLORIDE, CALCIUM CHLORIDE 600; 310; 30; 20 MG/100ML; MG/100ML; MG/100ML; MG/100ML
20 INJECTION, SOLUTION INTRAVENOUS CONTINUOUS
OUTPATIENT
Start: 2025-07-03

## 2025-07-03 NOTE — PROGRESS NOTES
Name: Lynne Riley      : 1975      MRN: [id number]  Encounter Provider: Jadyn Garg MD  Encounter Date: 7/3/2025   Encounter department: [facility] [place]  :  Assessment & Plan  Menorrhagia with irregular cycle    Orders:    Case request operating room: EXAM UNDER ANESTHESIA (EUA), HYSTERECTOMY LAPAROSCOPIC TOTAL (LTH), SALPINGECTOMY, LAPAROSCOPIC, OOPHORECTOMY, LAPAROSCOPIC; Standing    Reviewed risks of procedure including bleeding, infection, VTE, cuff dehiscence, urologic injury, bowel injury in detail.  Discussed recovery restrictions and expectations  Consents signed  Preop wash and drinks given.      History of Present Illness     Lynne Riley is a 49 y.o. female who presents with worsening heavy irregular menses to the point of anemia.  Recent sono 2025 showed uterine changes c/w adenomyosis.  She has a h/o early breast CA so hormonal manipulation is not an option.  She is not open to Mirena IUD or Lysteda at this point.  Previous GYN had started the discussion regarding hysterectomy.        OB History          1    Para   1    Term   1       0    AB   0    Living   1         SAB   0    IAB   0    Ectopic   0    Multiple   0    Live Births   1           Obstetric Comments   Menarche : 14  Age at first pregnancy 36  Hx of BCP   Hx of Hormone replacement                Past Medical History[1]    Past Surgical History[2]    Current Outpatient Medications on File Prior to Visit   Medication Sig    other medication, see sig, Medication/product name: testosterone cream  Strength: 4 mg/ml  Sig (include dose, route, frequency): apply 0.5 ml to labia daily     No current facility-administered medications on file prior to visit.       No Known Allergies    Social History[3]    Family History[4]    Review of Systems   Constitutional: Positive for malaise/fatigue. Negative for decreased appetite, diaphoresis and fever.   Gastrointestinal:  Negative for abdominal  "pain, change in bowel habit, nausea and vomiting.   Genitourinary:  Positive for menorrhagia. Negative for dysuria, missed menses and pelvic pain.   Neurological:  Positive for dizziness and weakness. Negative for headaches and light-headedness.          Objective   /72 (BP Location: Right arm, Patient Position: Sitting, Cuff Size: Large)   Ht 5' 3.5\" (1.613 m)   Wt 60.4 kg (133 lb 3.2 oz)   LMP 06/27/2025 (Exact Date)   BMI 23.23 kg/m²      Physical Exam  Constitutional:       Appearance: Normal appearance.   HENT:      Head: Normocephalic.     Cardiovascular:      Rate and Rhythm: Normal rate and regular rhythm.   Pulmonary:      Effort: Pulmonary effort is normal.   Abdominal:      General: There is no distension.     Musculoskeletal:         General: No swelling.     Neurological:      General: No focal deficit present.      Mental Status: She is alert and oriented to person, place, and time.     Skin:     General: Skin is warm and dry.     Psychiatric:         Mood and Affect: Mood normal.         Behavior: Behavior normal.   Vitals reviewed.      Pelvic Sono 5/19/2025    FINDINGS:   UTERUS:  The uterus is anteverted in position, measuring 10.1 x 4.5 x 6.0 cm.  The uterus has a bulbous contour and coarsened heterogeneous echotexture, most consistent with the appearance of diffuse adenomyosis.  The cervix appears within normal limits.   ENDOMETRIUM:  The endometrial echo complex has an AP caliber of 12.0 mm.  The endometrium is heterogeneous. There is a 0.6 cm isoechoic to mildly hyperechoic nodule with a possible feeder vessel within the endometrium.   OVARIES/ADNEXA:  Right ovary: 4.1 x 2.3 x 2.2 cm. 10.7 mL.  Ovarian Doppler flow is within normal limits.  A 2.6 x 1.7 x 2.0 cm lesion containing fine low-level internal echoes in the right ovary demonstrates associated enhanced through transmission without definite internal vascularity, slightly smaller than on the prior study, possibly representing an " "  involuting endometrioma.   Left ovary: 5.4 x 2.2 x 4.4 cm. 27.4 mL.  Ovarian Doppler flow is within normal limits.  No suspicious ovarian or adnexal abnormality.   OTHER:  No free fluid or loculated fluid collections.   IMPRESSION:   1.  0.6 cm nodule in the endometrium possibly an endometrial polyp. Other mass lesions not excluded. Further clinical assessment/gynecological evaluation advised.  2.  2.6 cm right ovarian lesion is slightly smaller than on the prior study possibly an involuting endometrioma.  3.  Sonographically normal left ovary.         [1]   Past Medical History:  Diagnosis Date    Anemia     slight    Anesthesia complication     teeth chattering so much pt chipped tooth    Anxiety     Breast cancer (HCC)     Cancer (HCC) 1/2019    Deformity of reconstructed breast     left breast \"some pockets\"    Dental crown present     Difficulty swallowing     \"at times\"    Dizzy spells     \"working with PCP\"    Ear problem     \"damage noted unknown cause, Right\"    Fatigue     GERD (gastroesophageal reflux disease)     Motion sickness     Nausea     \"with dizziness\"    Palpitations     PONV (postoperative nausea and vomiting)     \"had scop patch last surgery was helpful\"    Rectal discomfort 03/16/2021    Seasonal allergies    [2]   Past Surgical History:  Procedure Laterality Date    AUGMENTATION BREAST Right 12/18/2019    Procedure: AUGMENTATION BREAST;  Surgeon: Isai Aragon MD;  Location: [facility];  Service: Plastics    AUGMENTATION MAMMAPLASTY Bilateral 12/18/2019    BREAST BIOPSY Bilateral 1/2019    rt- fibroadenoma, lt- br ca    BREAST RECONSTRUCTION Left 12/18/2019    Procedure: RECONSTRUCTION BREAST W/ IMPLANT;  Surgeon: Isai Aragon MD;  [facility]: [facility];  Service: Plastics    CAPSULOTOMY Left 12/18/2019    Procedure: CAPSULECTOMY;  Surgeon: Isai Aragon MD;  [facility]: [facility];  Service: Plastics    COLONOSCOPY      COLPOSCOPY  2000    INSERTION OF BREAST TISSUE EXPANDER Left 05/17/2019 "    Procedure: INSERTION/PLACEMENT TISSUE EXPANDER (EXCHANGE);  Surgeon: Isai Aragon MD;  [facility]: [facility];  Service: Plastics    LEG SURGERY Left     cyst removed from leg    LIPOSUCTION W/ FAT INJECTION Bilateral 2020    Procedure: MOUND FAT GRAFTING;  Surgeon: Isai Aragon MD;  [facility];  Service: Plastics    MAMMO STEREOTACTIC BREAST BIOPSY RIGHT (ALL INC) Right 2022    MASTECTOMY W/ SENTINEL NODE BIOPSY Left 2019    Procedure: MASTECTOMY WITH BIOPSY LYMPH NODE SENTINEL; NUC MED 0730;  Surgeon: Britt Childs MD;  [facility];  Service: Surgical Oncology    ME IMPLNT BIO IMPLNT FOR SOFT TISSUE REINFORCEMENT Left 2019    Procedure: RECONSTRUCTION BREAST W/ IMPLANT;  Surgeon: Isai Aragon MD;  [facility];  Service: Plastics    ME REVISION OF RECONSTRUCTED BREAST Bilateral 2020    Procedure: BREAST MOUND REVISION WITH;  Surgeon: Isai Aragon MD;  [facility];  Service: Plastics    SINUS SURGERY      US GUIDANCE BREAST BIOPSY RIGHT EACH ADDITIONAL Right 2019    US GUIDANCE BREAST BIOPSY RIGHT EACH ADDITIONAL Right 2024    US GUIDED BREAST BIOPSY LEFT COMPLETE Left 2019    US GUIDED BREAST BIOPSY RIGHT COMPLETE Right 2024    WISDOM TOOTH EXTRACTION     [3]   Social History  Tobacco Use    Smoking status: Former     Current packs/day: 0.00     Types: Cigarettes     Start date:      Quit date:      Years since quittin.5    Smokeless tobacco: Never    Tobacco comments:     was a casual former smoker    Vaping Use    Vaping status: Never Used   Substance Use Topics    Alcohol use: Yes     Alcohol/week: 3.0 standard drinks of alcohol     Types: 3 Glasses of wine per week     Comment: wine    Drug use: No   [4]   Family History  Problem Relation Name Age of Onset    Vision loss Mother Brenda     No Known Problems Father      No Known Problems Sister      No Known Problems Sister      No Known Problems Sister       No Known Problems Brother      Leukemia Maternal Grandmother Swathi 83    Cancer Maternal Grandmother Swathi     Melanoma Maternal Grandfather Cristofer 70    Cancer Maternal Grandfather Cristofer     No Known Problems Paternal Grandmother      No Known Problems Paternal Grandfather      Rectal cancer Maternal Aunt Kylah     No Known Problems Maternal Aunt      No Known Problems Paternal Aunt      No Known Problems Paternal Aunt      No Known Problems Paternal Aunt      No Known Problems Paternal Aunt      No Known Problems Paternal Aunt      Coronary artery disease Cousin      Glaucoma Other family     Colon cancer Neg Hx      Colon polyps Neg Hx

## 2025-07-16 ENCOUNTER — OFFICE VISIT (OUTPATIENT)
Dept: FAMILY MEDICINE CLINIC | Facility: HOSPITAL | Age: 50
End: 2025-07-16
Payer: COMMERCIAL

## 2025-07-16 VITALS
SYSTOLIC BLOOD PRESSURE: 120 MMHG | TEMPERATURE: 97.5 F | OXYGEN SATURATION: 98 % | HEART RATE: 92 BPM | WEIGHT: 132.4 LBS | HEIGHT: 64 IN | BODY MASS INDEX: 22.61 KG/M2 | DIASTOLIC BLOOD PRESSURE: 68 MMHG

## 2025-07-16 DIAGNOSIS — E55.9 VITAMIN D DEFICIENCY: ICD-10-CM

## 2025-07-16 DIAGNOSIS — E53.8 VITAMIN B12 DEFICIENCY: ICD-10-CM

## 2025-07-16 DIAGNOSIS — D50.0 IRON DEFICIENCY ANEMIA DUE TO CHRONIC BLOOD LOSS: ICD-10-CM

## 2025-07-16 DIAGNOSIS — Z00.00 ANNUAL PHYSICAL EXAM: ICD-10-CM

## 2025-07-16 DIAGNOSIS — R42 DIZZINESS: Primary | ICD-10-CM

## 2025-07-16 DIAGNOSIS — R07.89 CHEST TIGHTNESS: ICD-10-CM

## 2025-07-16 DIAGNOSIS — Z12.31 ENCOUNTER FOR SCREENING MAMMOGRAM FOR BREAST CANCER: ICD-10-CM

## 2025-07-16 PROCEDURE — 99396 PREV VISIT EST AGE 40-64: CPT | Performed by: NURSE PRACTITIONER

## 2025-07-16 PROCEDURE — 99214 OFFICE O/P EST MOD 30 MIN: CPT | Performed by: NURSE PRACTITIONER

## 2025-07-16 NOTE — PROGRESS NOTES
Adult Annual Physical  Name: Lynne Riley      : 1975      MRN: [id number]  Encounter Provider: RIANA Gold  Encounter Date: 2025   Encounter department: [facility] [address]     :  Assessment & Plan  Dizziness  Chronic, likely multifactorial - currently w/o red flag neuro sx's  R/O brain vs cervical etiology w/MRIs as ordered per pt request - will determine further plan pending results    Orders:    MRI brain wo contrast; Future    MRI cervical spine wo contrast; Future    Chest tightness  Exertional of lower chest/upper abdomen  Will evaluate further w/stress echo as was previously ordered (in  not yet scheduled) - will determine further plan pending result    Orders:    Echo stress test, exercise; Future    Iron deficiency anemia due to chronic blood loss  S/P iron infusion x4 - last completed the end of   Iron studies ordered to be repeated in Sept    Orders:    CBC and differential; Future    Iron, TIBC and Ferritin Panel; Future    Vitamin D deficiency  She is supplementing with D daily   Recheck D level w/next iron labs ordered     Orders:    Vitamin D 25 hydroxy; Future    Vitamin B12 deficiency  She supplements daily w/B12  Recheck B12 level as ordered     Orders:    Vitamin B12; Future    Annual physical exam  PE updated, return in 1 year for next       Encounter for screening mammogram for breast cancer    Orders:    Mammo screening bilateral w 3d and cad; Future      Preventive Screenings:  - Diabetes Screening: screening up-to-date  - Cholesterol Screening: screening up-to-date   - Hepatitis C screening: screening up-to-date   - HIV screening: screening up-to-date   - Cervical cancer screening: screening up-to-date   - Breast cancer screening: has history of breast cancer   - Colon cancer screening: screening up-to-date   - Lung cancer screening: screening not indicated     Counseling/Anticipatory Guidance:    - Diet: discussed recommendations  "for a healthy/well-balanced diet.   - Exercise: the importance of regular exercise/physical activity was discussed. Recommend exercise 3-5 times per week for at least 30 minutes.       Depression Screening and Follow-up Plan: Patient was screened for depression during today's encounter. They screened negative with a PHQ-2 score of 0.        History of Present Illness     Adult Annual Physical:  Patient presents for annual physical. Here to update annual PE. States she has been well but is struggling with what she believes are perimenopause symptoms - is dizzy everyday, has low back pain and leg weakness. Is able to \"push through\" but is worse when she is tired. No longer \"cognitively sharp\".   Saw gyn and will be having a hysterectomy in the fall for heavy periods.  S/P iron infusion x4 - last was done last week of June     Supplements regularly - vitamin D, K, creatine, CoQ10, B12 .     Diet and Physical Activity:  - Diet/Nutrition: well balanced diet and limited junk food.  - Exercise: moderate cardiovascular exercise, strength training exercises, 30-60 minutes on average and 3-4 times a week on average.    Depression Screening:  - PHQ-2 Score: 0    General Health:  - Sleep: sleeps well and 7-8 hours of sleep on average.  - Hearing: normal hearing bilateral ears.  - Vision: most recent eye exam > 1 year ago.  - Dental: regular dental visits and brushes teeth twice daily.    /GYN Health:  - Follows with GYN: yes.   - Menopause: perimenopausal.     Advanced Care Planning:  - Has an advanced directive?: no    - ACP document given to patient?: yes        Review of Systems   Constitutional:  Positive for fatigue.   HENT: Negative.     Respiratory: Negative.     Cardiovascular:  Positive for chest pain (lower chest/upper abdomen - chronic, usually during exercise; stress test ordered in 2023 but not scheduled yet).   Gastrointestinal: Negative.    Genitourinary:  Positive for menstrual problem (heavy periods - follows " "w/gyn).   Musculoskeletal:  Positive for back pain.   Neurological:  Positive for dizziness, weakness (legs after working out) and light-headedness. Negative for syncope (near syncopal with head changes), facial asymmetry and speech difficulty.   Psychiatric/Behavioral:  Negative for dysphoric mood.        Objective   /68   Pulse 92   Temp 97.5 °F (36.4 °C)   Ht 5' 3.5\" (1.613 m)   Wt 60.1 kg (132 lb 6.4 oz)   SpO2 98%   BMI 23.09 kg/m²       Physical Exam  Vitals reviewed.   Constitutional:       General: She is not in acute distress.     Appearance: Normal appearance.   HENT:      Head: Normocephalic.      Right Ear: Tympanic membrane normal.      Left Ear: Tympanic membrane normal.      Nose: Nose normal.      Mouth/Throat:      Mouth: Mucous membranes are moist.      Pharynx: Oropharynx is clear.     Eyes:      General: No scleral icterus.        Right eye: No discharge.         Left eye: No discharge.     Neck:      Thyroid: No thyromegaly.     Cardiovascular:      Rate and Rhythm: Normal rate and regular rhythm.      Heart sounds: No murmur heard.  Pulmonary:      Effort: Pulmonary effort is normal. No respiratory distress.      Breath sounds: Normal breath sounds.   Abdominal:      General: Abdomen is flat.      Tenderness: There is no abdominal tenderness.     Musculoskeletal:      Cervical back: Normal range of motion.      Right lower leg: No edema.      Left lower leg: No edema.   Lymphadenopathy:      Cervical: No cervical adenopathy.     Skin:     General: Skin is warm and dry.     Neurological:      General: No focal deficit present.      Mental Status: She is alert and oriented to person, place, and time.      Cranial Nerves: No cranial nerve deficit.      Gait: Gait normal.     Psychiatric:         Mood and Affect: Mood normal.         Behavior: Behavior normal.         Thought Content: Thought content normal.         Judgment: Judgment normal.         Administrative Statements   I have " spent a total time of 30 minutes in caring for this patient on the day of the visit/encounter including Diagnostic results, Instructions for management, Patient and family education, Impressions, Counseling / Coordination of care, Documenting in the medical record, Reviewing/placing orders in the medical record (including tests, medications, and/or procedures), and Obtaining or reviewing history

## 2025-07-16 NOTE — ASSESSMENT & PLAN NOTE
S/P iron infusion x4 - last completed the end of June  Iron studies ordered to be repeated in Sept    Orders:    CBC and differential; Future    Iron, TIBC and Ferritin Panel; Future

## 2025-07-16 NOTE — PATIENT INSTRUCTIONS
"Patient Education     Routine physical for adults   The Basics   Written by the doctors and editors at [facility]   What is a physical? -- A physical is a routine visit, or \"check-up,\" with your doctor. You might also hear it called a \"wellness visit\" or \"preventive visit.\"  During each visit, the doctor will:   Ask about your physical and mental health   Ask about your habits, behaviors, and lifestyle   Do an exam   Give you vaccines if needed   Talk to you about any medicines you take   Give advice about your health   Answer your questions  Getting regular check-ups is an important part of taking care of your health. It can help your doctor find and treat any problems you have. But it's also important for preventing health problems.  A routine physical is different from a \"sick visit.\" A sick visit is when you see a doctor because of a health concern or problem. Since physicals are scheduled ahead of time, you can think about what you want to ask the doctor.  How often should I get a physical? -- It depends on your age and health. In general, for people age 21 years and older:   If you are younger than 50 years, you might be able to get a physical every 3 years.   If you are 50 years or older, your doctor might recommend a physical every year.  If you have an ongoing health condition, like diabetes or high blood pressure, your doctor will probably want to see you more often.  What happens during a physical? -- In general, each visit will include:   Physical exam - The doctor or nurse will check your height, weight, heart rate, and blood pressure. They will also look at your eyes and ears. They will ask about how you are feeling and whether you have any symptoms that bother you.   Medicines - It's a good idea to bring a list of all the medicines you take to each doctor visit. Your doctor will talk to you about your medicines and answer any questions. Tell them if you are having any side effects that bother you. You " "should also tell them if you are having trouble paying for any of your medicines.   Habits and behaviors - This includes:   Your diet   Your exercise habits   Whether you smoke, drink alcohol, or use drugs   Whether you are sexually active   Whether you feel safe at home  Your doctor will talk to you about things you can do to improve your health and lower your risk of health problems. They will also offer help and support. For example, if you want to quit smoking, they can give you advice and might prescribe medicines. If you want to improve your diet or get more physical activity, they can help you with this, too.   Lab tests, if needed - The tests you get will depend on your age and situation. For example, your doctor might want to check your:   Cholesterol   Blood sugar   Iron level   Vaccines - The recommended vaccines will depend on your age, health, and what vaccines you already had. Vaccines are very important because they can prevent certain serious or deadly infections.   Discussion of screening - \"Screening\" means checking for diseases or other health problems before they cause symptoms. Your doctor can recommend screening based on your age, risk, and preferences. This might include tests to check for:   Cancer, such as breast, prostate, cervical, ovarian, colorectal, prostate, lung, or skin cancer   Sexually transmitted infections, such as chlamydia and gonorrhea   Mental health conditions like depression and anxiety  Your doctor will talk to you about the different types of screening tests. They can help you decide which screenings to have. They can also explain what the results might mean.   Answering questions - The physical is a good time to ask the doctor or nurse questions about your health. If needed, they can refer you to other doctors or specialists, too.  Adults older than 65 years often need other care, too. As you get older, your doctor will talk to you about:   How to prevent falling at " home   Hearing or vision tests   Memory testing   How to take your medicines safely   Making sure that you have the help and support you need at home  All topics are updated as new evidence becomes available and our peer review process is complete.  This topic retrieved from [employer] on: May 02, 2024.  Topic 424928 Version 1.0  Release: 32.4.3 - C32.122  © 2024 [employer] and/or its affiliates. All rights reserved.  Consumer Information Use and Disclaimer   Disclaimer: This generalized information is a limited summary of diagnosis, treatment, and/or medication information. It is not meant to be comprehensive and should be used as a tool to help the user understand and/or assess potential diagnostic and treatment options. It does NOT include all information about conditions, treatments, medications, side effects, or risks that may apply to a specific patient. It is not intended to be medical advice or a substitute for the medical advice, diagnosis, or treatment of a health care provider based on the health care provider's examination and assessment of a patient's specific and unique circumstances. Patients must speak with a health care provider for complete information about their health, medical questions, and treatment options, including any risks or benefits regarding use of medications. This information does not endorse any treatments or medications as safe, effective, or approved for treating a specific patient. [employer] and its affiliates disclaim any warranty or liability relating to this information or the use thereof.The use of this information is governed by the Terms of Use, available at https://www.[facility]ters[facility] Ocean[facility] Instituteuwer.com/en/know/clinical-effectiveness-terms. 2024© [employer], Inc. and its affiliates and/or licensors. All rights reserved.  Copyright   © 2024 [employer], [employer] and/or its affiliates. All rights reserved.

## (undated) DEVICE — TUBING SUCTION 5MM X 12 FT

## (undated) DEVICE — PENCIL ELECTROSURG E-Z CLEAN -0035H

## (undated) DEVICE — SYRINGE 50ML LL

## (undated) DEVICE — CHEST/BREAST DRAPE: Brand: CONVERTORS

## (undated) DEVICE — Device

## (undated) DEVICE — GLOVE SRG BIOGEL 6

## (undated) DEVICE — PREP PAD BNS: Brand: CONVERTORS

## (undated) DEVICE — SUT MONOCRYL 4-0 P-3 18 IN Y494G

## (undated) DEVICE — TOWEL SURG XR DETECT GREEN STRL RFD

## (undated) DEVICE — GLOVE INDICATOR PI UNDERGLOVE SZ 6.5 BLUE

## (undated) DEVICE — SUT MONOCRYL 4-0 PS-2 27 IN Y426H

## (undated) DEVICE — DRAPE EQUIPMENT RF WAND

## (undated) DEVICE — INTENDED FOR TISSUE SEPARATION, AND OTHER PROCEDURES THAT REQUIRE A SHARP SURGICAL BLADE TO PUNCTURE OR CUT.: Brand: BARD-PARKER SAFETY BLADES SIZE 15, STERILE

## (undated) DEVICE — TUBING LIPOSUCTION ASPIRATION 12FT STERILE

## (undated) DEVICE — BOWL: 16OZ PEELPOUCH 75/CS 16/PLT: Brand: MEDEGEN MEDICAL PRODUCTS, LLC

## (undated) DEVICE — PLUMEPEN PRO 10FT

## (undated) DEVICE — SUT VICRYL 2-0 CT-1 36 IN J945H

## (undated) DEVICE — GLOVE SRG BIOGEL 7.5

## (undated) DEVICE — SPONGE STICK WITH PVP-I: Brand: KENDALL

## (undated) DEVICE — GLOVE SRG BIOGEL 6.5

## (undated) DEVICE — GLOVE INDICATOR PI UNDERGLOVE SZ 7 BLUE

## (undated) DEVICE — SUT SILK 2-0 SH 30 IN K833H

## (undated) DEVICE — ELECTRODE BLADE MOD E-Z CLEAN  2.75IN 7CM -0012AM

## (undated) DEVICE — 2000CC GUARDIAN II: Brand: GUARDIAN

## (undated) DEVICE — NEEDLE 25G X 1 1/2

## (undated) DEVICE — INTENDED FOR TISSUE SEPARATION, AND OTHER PROCEDURES THAT REQUIRE A SHARP SURGICAL BLADE TO PUNCTURE OR CUT.: Brand: BARD-PARKER SAFETY BLADES SIZE 10, STERILE

## (undated) DEVICE — UNDYED MONOFILAMENT (POLYDIOXANONE), ABSORBABLE SURGICAL SUTURE: Brand: PDS

## (undated) DEVICE — BULB SYRINGE,IRRIGATION WITH PROTECTIVE CAP: Brand: DOVER

## (undated) DEVICE — ABDOMINAL PAD: Brand: DERMACEA

## (undated) DEVICE — INTENDED FOR TISSUE SEPARATION, AND OTHER PROCEDURES THAT REQUIRE A SHARP SURGICAL BLADE TO PUNCTURE OR CUT.: Brand: BARD-PARKER ® CARBON RIB-BACK BLADES

## (undated) DEVICE — BAG DECANTER

## (undated) DEVICE — 60 ML SYRINGE,TOOMEY TYPE: Brand: MONOJECT

## (undated) DEVICE — GLOVE SRG BIOGEL 7

## (undated) DEVICE — SUT MONOCRYL 3-0 SH 27 IN Y416H

## (undated) DEVICE — JP CHAN DRN SIL HUBLESS 15FR W/TRO: Brand: CARDINAL HEALTH

## (undated) DEVICE — SUT MONOCRYL 3-0 PS-2 27 IN Y427H

## (undated) DEVICE — BETHLEHEM UNIVERSAL MINOR GEN: Brand: CARDINAL HEALTH

## (undated) DEVICE — SUT STRATAFIX SPIRAL PDS PLUS 1 CTX 18 IN SXPP1A400

## (undated) DEVICE — 3000CC GUARDIAN II: Brand: GUARDIAN

## (undated) DEVICE — NEEDLE 18 G X 1 1/2

## (undated) DEVICE — SCD SEQUENTIAL COMPRESSION COMFORT SLEEVE MEDIUM KNEE LENGTH: Brand: KENDALL SCD

## (undated) DEVICE — MEDI-VAC YANKAUER SUCTION HANDLE W/BULBOUS AND CONTROL VENT: Brand: CARDINAL HEALTH

## (undated) DEVICE — SUT ETHILON 3-0 PS-1 18 IN 1663G

## (undated) DEVICE — SYRINGE 10ML LL

## (undated) DEVICE — GLOVE SRG BIOGEL ECLIPSE 7

## (undated) DEVICE — SPONGE LAP 18 X 18 IN STRL RFD

## (undated) DEVICE — JACKSON-PRATT 100CC BULB RESERVOIR: Brand: CARDINAL HEALTH

## (undated) DEVICE — NEEDLE HYPO 22G X 1-1/2 IN

## (undated) DEVICE — SUT PDS II 2-0 CT-1 27 IN Z259H

## (undated) DEVICE — ADHESIVE SKIN HIGH VISCOSITY EXOFIN 1ML

## (undated) DEVICE — ADHESIVE SKN CLSR HISTOACRYL FLEX 0.5ML LF

## (undated) DEVICE — SUT VICRYL 2-0 CT-1 27 IN J259H

## (undated) DEVICE — POV-IOD SOLUTION 4OZ BT

## (undated) DEVICE — BETHLEHEM UNIVERSAL LAPAROTOMY: Brand: CARDINAL HEALTH

## (undated) DEVICE — ELECTRODE BLADE MOD  E-Z CLEAN 6.5IN -0014M

## (undated) DEVICE — PROVE COVER: Brand: UNBRANDED

## (undated) DEVICE — CHLORAPREP HI-LITE 26ML ORANGE